# Patient Record
Sex: FEMALE | Race: WHITE | NOT HISPANIC OR LATINO | Employment: UNEMPLOYED | URBAN - METROPOLITAN AREA
[De-identification: names, ages, dates, MRNs, and addresses within clinical notes are randomized per-mention and may not be internally consistent; named-entity substitution may affect disease eponyms.]

---

## 2017-04-26 ENCOUNTER — HOSPITAL ENCOUNTER (EMERGENCY)
Facility: HOSPITAL | Age: 57
Discharge: HOME | End: 2017-04-26
Attending: EMERGENCY MEDICINE
Payer: COMMERCIAL

## 2017-04-26 VITALS
WEIGHT: 200 LBS | HEART RATE: 83 BPM | OXYGEN SATURATION: 96 % | HEIGHT: 67 IN | TEMPERATURE: 98 F | RESPIRATION RATE: 17 BRPM | BODY MASS INDEX: 31.39 KG/M2 | SYSTOLIC BLOOD PRESSURE: 200 MMHG | DIASTOLIC BLOOD PRESSURE: 83 MMHG

## 2017-04-26 DIAGNOSIS — S02.92XB OPEN FRACTURE OF FACIAL BONE, UNSPECIFIED FACIAL BONE, INITIAL ENCOUNTER: ICD-10-CM

## 2017-04-26 DIAGNOSIS — S01.81XA LACERATION OF FACE, INITIAL ENCOUNTER: Primary | ICD-10-CM

## 2017-04-26 PROCEDURE — 96372 THER/PROPH/DIAG INJ SC/IM: CPT

## 2017-04-26 PROCEDURE — 63600175 PHARM REV CODE 636 W HCPCS: Performed by: PHYSICIAN ASSISTANT

## 2017-04-26 PROCEDURE — 25000003 PHARM REV CODE 250: Performed by: PHYSICIAN ASSISTANT

## 2017-04-26 PROCEDURE — 90715 TDAP VACCINE 7 YRS/> IM: CPT | Performed by: PHYSICIAN ASSISTANT

## 2017-04-26 PROCEDURE — 99284 EMERGENCY DEPT VISIT MOD MDM: CPT | Mod: 25

## 2017-04-26 PROCEDURE — 13153 CMPLX RPR E/N/E/L ADDL 5CM/<: CPT | Mod: ,,, | Performed by: PHYSICIAN ASSISTANT

## 2017-04-26 PROCEDURE — 99283 EMERGENCY DEPT VISIT LOW MDM: CPT | Mod: 25,,, | Performed by: PHYSICIAN ASSISTANT

## 2017-04-26 PROCEDURE — 90471 IMMUNIZATION ADMIN: CPT | Performed by: PHYSICIAN ASSISTANT

## 2017-04-26 PROCEDURE — 12054 INTMD RPR FACE/MM 7.6-12.5CM: CPT

## 2017-04-26 PROCEDURE — 13152 CMPLX RPR E/N/E/L 2.6-7.5 CM: CPT | Mod: ,,, | Performed by: PHYSICIAN ASSISTANT

## 2017-04-26 RX ORDER — LIDOCAINE HYDROCHLORIDE 10 MG/ML
10 INJECTION INFILTRATION; PERINEURAL
Status: COMPLETED | OUTPATIENT
Start: 2017-04-26 | End: 2017-04-26

## 2017-04-26 RX ORDER — LORAZEPAM 2 MG/ML
1 INJECTION INTRAMUSCULAR
Status: COMPLETED | OUTPATIENT
Start: 2017-04-26 | End: 2017-04-26

## 2017-04-26 RX ORDER — CEPHALEXIN 500 MG/1
500 CAPSULE ORAL 4 TIMES DAILY
Qty: 28 CAPSULE | Refills: 0 | Status: SHIPPED | OUTPATIENT
Start: 2017-04-26 | End: 2017-05-03

## 2017-04-26 RX ORDER — CEFAZOLIN SODIUM 1 G/3ML
1 INJECTION, POWDER, FOR SOLUTION INTRAMUSCULAR; INTRAVENOUS
Status: COMPLETED | OUTPATIENT
Start: 2017-04-26 | End: 2017-04-26

## 2017-04-26 RX ADMIN — LIDOCAINE HYDROCHLORIDE 10 ML: 10 INJECTION, SOLUTION INFILTRATION; PERINEURAL at 03:04

## 2017-04-26 RX ADMIN — CEFAZOLIN 1 G: 330 INJECTION, POWDER, FOR SOLUTION INTRAMUSCULAR; INTRAVENOUS at 04:04

## 2017-04-26 RX ADMIN — CLOSTRIDIUM TETANI TOXOID ANTIGEN (FORMALDEHYDE INACTIVATED), CORYNEBACTERIUM DIPHTHERIAE TOXOID ANTIGEN (FORMALDEHYDE INACTIVATED), BORDETELLA PERTUSSIS TOXOID ANTIGEN (GLUTARALDEHYDE INACTIVATED), BORDETELLA PERTUSSIS FILAMENTOUS HEMAGGLUTININ ANTIGEN (FORMALDEHYDE INACTIVATED), BORDETELLA PERTUSSIS PERTACTIN ANTIGEN, AND BORDETELLA PERTUSSIS FIMBRIAE 2/3 ANTIGEN 0.5 ML: 5; 2; 2.5; 5; 3; 5 INJECTION, SUSPENSION INTRAMUSCULAR at 03:04

## 2017-04-26 RX ADMIN — LORAZEPAM 1 MG: 2 INJECTION INTRAMUSCULAR; INTRAVENOUS at 04:04

## 2017-04-26 NOTE — ED PROVIDER NOTES
"Encounter Date: 4/26/2017       History     Chief Complaint   Patient presents with    Head Injury     Pt reports she slipped and hit head on trash can. +LOC. Large lac noted above right eye. Right eye swollen shut.  Gauze and pressure dressing applied. Pt denies use of blood thinners, dizziness, lightheadedness at this time.     Review of patient's allergies indicates:  No Known Allergies  HPI Comments: Pt is a 57 yo F with a PMHx of anxiety, HTN and hyperlipidaemia. She presents post unobserved mechanical fall with LOC "for a few minutes". She reports bleeding from a (R) forehead laceration, and obscured vision from a swollen (R) eye. In ED she has a (R) sided headache, and denies nausea, vomiting, blurred or double vision, weakness or confusion.     Past Medical History:   Diagnosis Date    Allergy     Anxiety     Arthritis     Depression     GERD (gastroesophageal reflux disease)     Hyperlipidemia     Hypertension     Tobacco abuse     Vitamin D deficiency disease      Past Surgical History:   Procedure Laterality Date    child birth       Family History   Problem Relation Age of Onset    Heart disease Mother     Hypertension Mother     Skin cancer Mother      Social History   Substance Use Topics    Smoking status: Current Every Day Smoker     Packs/day: 1.50    Smokeless tobacco: Never Used    Alcohol use Yes      Comment: 12 pack every 3 days     Review of Systems   Constitutional: Negative for fever.   HENT: Positive for facial swelling (swollen (R) periorbital area). Negative for sore throat.    Eyes: Positive for visual disturbance (vision obscured by swollen eye lids, nil blurring or double vision).   Respiratory: Negative for shortness of breath.    Cardiovascular: Negative for chest pain.   Gastrointestinal: Negative for nausea and vomiting.   Genitourinary: Negative for dysuria.   Musculoskeletal: Negative for back pain.   Skin: Positive for wound (laceration to (R) forehead and " eyebrow). Negative for rash.   Neurological: Positive for syncope (Loss of coonsciousness after fall ) and headaches (R) sided headache . Negative for dizziness, speech difficulty, weakness, light-headedness and numbness.   Hematological: Does not bruise/bleed easily.   Psychiatric/Behavioral: The patient is nervous/anxious (Anxiety at being in ED).        Physical Exam   Initial Vitals   BP Pulse Resp Temp SpO2   04/26/17 0036 04/26/17 0036 04/26/17 0036 04/26/17 0036 04/26/17 0037   200/83 83 17 98.3 °F (36.8 °C) 96 %     Physical Exam    Constitutional: Vital signs are normal. She appears well-developed and well-nourished.   HENT:   Head: Normocephalic and atraumatic.   6cm long and 1.5cm deep laceration longitudinally along (R) forehead, extending through the right brow. Hemostasis achieved.      EOMI, PERRLA   Eyes: Conjunctivae and EOM are normal. Pupils are equal, round, and reactive to light.   Swollen (R) eyelid, subconjunctival bleeding (R) eye. Nil hyphema.   AZRA, normal extraoccular movements. Visual acuity grossly normal    Cardiovascular: Normal rate and regular rhythm. Exam reveals no gallop and no friction rub.    Murmur (Systolic mumur) heard.  Pulmonary/Chest: Breath sounds normal.   Abdominal: Soft. Normal appearance, normal aorta and bowel sounds are normal.   Musculoskeletal: Normal range of motion.   Neurological: She is alert and oriented to person, place, and time. She has normal strength. No cranial nerve deficit (CN exam grossly normal ).   Skin: Skin is warm and intact.   Psychiatric: She has a normal mood and affect. Her speech is normal and behavior is normal. Cognition and memory are normal.         ED Course   Lac Repair  Date/Time: 4/26/2017 5:09 AM  Performed by: CRISTI MARKS  Authorized by: SALVADOR GOODRICH   Body area: head/neck  Location details: right eyelid  Laceration length: 8 cm  Foreign bodies: no foreign bodies  Tendon involvement: none  Nerve involvement:  none  Vascular damage: no  Anesthesia: local infiltration    Anesthesia:  Anesthesia: local infiltration  Local Anesthetic: lidocaine 1% without epinephrine   Anesthetic total: 10 mL  Patient sedated: no  Irrigation solution: saline  Irrigation method: jet lavage  Amount of cleaning: extensive  Debridement: none  Degree of undermining: none  Skin closure: 6-0 nylon  Subcutaneous closure: 4-0 Chromic gut  Number of sutures: 14  Technique: simple and horizontal mattress  Approximation: close  Approximation difficulty: complex  Dressing: 4x4 sterile gauze and petrolatum gauze  Patient tolerance: Patient tolerated the procedure well with no immediate complications        Labs Reviewed - No data to display          Medical Decision Making:   Differential Diagnosis:   Laceration to (R) forehead, swollen (R) eyelids  ?Facial fracture  ED Management:  CT head clear,   Pt has a lamina propria Fx, Will send to ENT for followup.     Complex, deep laceration repaired, see procedure note,   Pt given ancef and DC with ABX for open Fx  DC instructions given                   ED Course     Clinical Impression:   The primary encounter diagnosis was Laceration of face, initial encounter. A diagnosis of Open fracture of facial bone, unspecified facial bone, initial encounter was also pertinent to this visit.    Disposition:   Disposition: Discharged  Condition: Stable       Lj Ramos PA-C  04/26/17 0512

## 2017-04-26 NOTE — ED AVS SNAPSHOT
[facility]  [address]  [facility] [id number]               Shannon Coleman   2017  1:33 AM   ED    Description:  Female : 1960   Department:  [facility]           Your Care was Coordinated By:     Provider Role From To    Brandan Cade MD Attending Provider 17 --    Lj Ramos PA-C Physician Assistant 17 --      Reason for Visit     Head Injury           Diagnoses this Visit        Comments    Laceration of face, initial encounter    -  Primary     Open fracture of facial bone, unspecified facial bone, initial encounter           ED Disposition     ED Disposition Condition Comment    Discharge             To Do List           Follow-up Information     Follow up with Yaya Reyna - Otorhinolaryngology.    Specialty:  Otolaryngology    Contact information:    [address]  [facility] [id number] [phone]    Additional information:    [facility] [place] - 4th Floor       These Medications        Disp Refills Start End    cephALEXin (KEFLEX) 500 MG capsule 28 capsule 0 2017 5/3/2017    Take 1 capsule (500 mg total) by mouth 4 (four) times daily. - Oral    Pharmacy: [id number] - [place], [place] - [address] [place] [address] #: [phone]         [facility]     [facility] Nurse Care Line - 24/ Assistance  Unless otherwise directed by your provider, please contact [facility], our nurse care line that is available for / assistance.     Registered nurses in the [facility] provide: appointment scheduling, clinical advisement, health education, and other advisory services.  Call: [phone] (toll free)               Medications           Message regarding Medications     Verify the changes and/or additions to your medication regime listed below are the same as discussed with your clinician today.  If any of these changes or additions are incorrect, please notify your  healthcare provider.        START taking these NEW medications        Refills    cephALEXin (KEFLEX) 500 MG capsule 0    Sig: Take 1 capsule (500 mg total) by mouth 4 (four) times daily.    Class: Print    Route: Oral      These medications were administered today        Dose Freq    lidocaine HCL 10 mg/ml (1%) injection 10 mL 10 mL ED 1 Time    Sig: 10 mLs by Infiltration route ED 1 Time.    Class: Normal    Route: Infiltration    Tdap vaccine injection 0.5 mL 0.5 mL Once    Sig: Inject 0.5 mLs into the muscle once.    Class: Normal    Route: Intramuscular    ceFAZolin injection 1 g 1 g ED 1 Time    Sig: Inject 1,000 mg (1 g total) into the muscle ED 1 Time.    Class: Normal    Route: Intramuscular    lorazepam injection 1 mg 1 mg ED 1 Time    Sig: Inject 0.5 mLs (1 mg total) into the muscle ED 1 Time.    Class: Normal    Route: Intramuscular           Verify that the below list of medications is an accurate representation of the medications you are currently taking.  If none reported, the list may be blank. If incorrect, please contact your healthcare provider. Carry this list with you in case of emergency.           Current Medications     amlodipine (NORVASC) 10 MG tablet TAKE 1 TABLET (10 MG TOTAL) BY MOUTH ONCE DAILY.    amlodipine (NORVASC) 10 MG tablet TAKE 1 TABLET (10 MG TOTAL) BY MOUTH ONCE DAILY.    amlodipine (NORVASC) 10 MG tablet Take 1 tablet (10 mg total) by mouth once daily.    atorvastatin (LIPITOR) 20 MG tablet Take 1 tablet (20 mg total) by mouth once daily.    atorvastatin (LIPITOR) 20 MG tablet TAKE 1 TABLET (20 MG TOTAL) BY MOUTH ONCE DAILY.    cephALEXin (KEFLEX) 500 MG capsule Take 1 capsule (500 mg total) by mouth 4 (four) times daily.    cetirizine (ZYRTEC) 10 MG tablet Take 1 tablet (10 mg total) by mouth every evening.    clonazePAM (KLONOPIN) 1 MG tablet Take 1 tablet (1 mg total) by mouth every evening.    diclofenac sodium (VOLTAREN) 1 % Gel Apply 2 g topically 4 (four) times daily.     fenofibrate 160 MG Tab Take 1 tablet (160 mg total) by mouth once daily.    fenofibrate 160 MG Tab TAKE 1 TABLET BY MOUTH EVERY DAY    fenofibrate 160 MG Tab TAKE 1 TABLET (160 MG TOTAL) BY MOUTH ONCE DAILY.    hydrocortisone 2.5 % ointment Apply topically 2 (two) times daily. To affected area    hydrocortisone 2.5 % ointment Apply topically 2 (two) times daily. To affected area    losartan-hydrochlorothiazide 100-25 mg (HYZAAR) 100-25 mg per tablet Take 1 tablet by mouth every morning.    losartan-hydrochlorothiazide 100-25 mg (HYZAAR) 100-25 mg per tablet Take 1 tablet by mouth every morning.    losartan-hydrochlorothiazide 100-25 mg (HYZAAR) 100-25 mg per tablet Take 1 tablet by mouth every morning.    meloxicam (MOBIC) 7.5 MG tablet Take 1 tablet (7.5 mg total) by mouth once daily.    metformin (GLUCOPHAGE) 500 MG tablet Take 1 tablet (500 mg total) by mouth daily with breakfast.    metoprolol succinate (TOPROL-XL) 25 MG 24 hr tablet TAKE 1 TABLET (25 MG TOTAL) BY MOUTH ONCE DAILY.    metoprolol succinate (TOPROL-XL) 25 MG 24 hr tablet TAKE 1 TABLET BY MOUTH EVERY DAY    metoprolol succinate (TOPROL-XL) 25 MG 24 hr tablet Take 1 tablet (25 mg total) by mouth once daily.    metoprolol succinate (TOPROL-XL) 25 MG 24 hr tablet Take 1 tablet (25 mg total) by mouth once daily.    MULTIVITS,CA,MINERALS/IRON/FA (ONE-A-DAY WOMENS FORMULA ORAL) Take 1 tablet by mouth once daily.    omega-3 fatty acids-vitamin E (FISH OIL) 1,000 mg Cap Take 2 capsules by mouth once daily.    omeprazole (PRILOSEC) 20 MG capsule TAKE ONE CAPSULE BY MOUTH EVERY DAY    paroxetine (PAXIL) 40 MG tablet TAKE 1 TABLET (40 MG TOTAL) BY MOUTH EVERY MORNING.    rosuvastatin (CRESTOR) 5 MG tablet Take 1 tablet (5 mg total) by mouth once daily.    tramadol (ULTRAM) 50 mg tablet Take 1 tablet (50 mg total) by mouth every 12 (twelve) hours as needed for Pain.           Clinical Reference Information           Your Vitals Were     BP Pulse Temp Resp  "Height Weight    200/83 (BP Location: Right arm) 83 98.3 °F (36.8 °C) (Oral) 17 5' 7" (1.702 m) 90.7 kg (200 lb)    SpO2 BMI             96% 31.32 kg/m2         Allergies as of 4/26/2017     No Known Allergies      Immunizations Administered on Date of Encounter - 4/26/2017     Name Date Dose VIS Date Route    TDAP 4/26/2017 0.5 mL 2/24/2015 Intramuscular      ED Micro, Lab, POCT     None      ED Imaging Orders     Start Ordered       Status Ordering Provider    04/26/17 0210 04/26/17 0209  CT Maxillofacial Without Contrast  1 time imaging      Final result     04/26/17 0210 04/26/17 0209  CT Head Without Contrast  1 time imaging      Final result         Discharge Instructions       Take all antibiotics until complete  followup with an ENT  Return to the ED if there is no improvement  Keep the wound clean daily with soap and water  Apply ice daily for swelling and pain.     Your Scheduled Appointments     Jul 06, 2017 10:00 AM CDT   Follow Up with Ramon Box MD   [facility]    [address] [place] [id number] [phone]              [facility] Sign-Up     Activating your [facility] account is as easy as 1-2-3!     1) Visit [email or web address], select Sign Up Now, enter this activation code and your date of birth, then select Next.  [email or web address]  Expires: 5/20/2017 12:36 PM      2) Create a username and password to use when you visit [facility] in the future and select a security question in case you lose your password and select Next.    3) Enter your e-mail address and click Sign Up!    Additional Information  If you have questions, please e-mail [email or web address] or call [phone] to talk to our [facility] staff. Remember, [facility] is NOT to be used for urgent needs. For medical emergencies, dial 911.         Smoking Cessation     If you would like to quit smoking:   You may be eligible for free services if you are a [place] resident and started smoking " cigarettes before September 1, 1988.  Call the Smoking Cessation Trust (SCT) toll free at (835) 617-1851 or (158) 226-5688.   Call 1-800-QUIT-NOW if you do not meet the above criteria.   Contact us via email: [email or web address]   View our website for more information: [email or web address]         [facility] complies with applicable Federal civil rights laws and does not discriminate on the basis of race, color, national origin, age, disability, or sex.        Language Assistance Services     ATTENTION: Language assistance services are available, free of charge. Please call [phone].      ATENCIÓN: Si habla español, tiene a rodriguez disposición servicios gratuitos de asistencia lingüística. Llame al [phone].     CHÚ Ý: N?u b?n nói Ti?ng Vi?t, có các d?ch v? h? tr? ngôn ng? mi?n phí dành cho b?n. G?i s? [phone].

## 2017-04-26 NOTE — ED TRIAGE NOTES
Pt presents to ED c/o falling tonight causing right eye injury and laceration to right forehead. Pt stated that she slipped when taking the trash out and hit her head on the garbage can. Pt lost consciousness for a few minutes. Bleeding currently controlled. Severe swelling noted to right eye.

## 2017-04-26 NOTE — DISCHARGE INSTRUCTIONS
Take all antibiotics until complete  followup with an ENT  Return to the ED if there is no improvement  Keep the wound clean daily with soap and water  Apply ice daily for swelling and pain.

## 2018-06-24 ENCOUNTER — HOSPITAL ENCOUNTER (INPATIENT)
Facility: HOSPITAL | Age: 58
LOS: 6 days | Discharge: HOME | DRG: 982 | End: 2018-06-30
Attending: EMERGENCY MEDICINE | Admitting: HOSPITALIST
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
## 2018-06-24 DIAGNOSIS — R07.9 CHEST PAIN: ICD-10-CM

## 2018-06-24 DIAGNOSIS — R55 SYNCOPE: ICD-10-CM

## 2018-06-24 DIAGNOSIS — R55 SYNCOPE, UNSPECIFIED SYNCOPE TYPE: ICD-10-CM

## 2018-06-24 DIAGNOSIS — F41.9 ANXIETY: ICD-10-CM

## 2018-06-24 DIAGNOSIS — S42.202A CLOSED FRACTURE OF PROXIMAL END OF LEFT HUMERUS, UNSPECIFIED FRACTURE MORPHOLOGY, INITIAL ENCOUNTER: ICD-10-CM

## 2018-06-24 DIAGNOSIS — I50.30 (HFPEF) HEART FAILURE WITH PRESERVED EJECTION FRACTION: ICD-10-CM

## 2018-06-24 PROBLEM — E87.1 HYPONATREMIA: Status: ACTIVE | Noted: 2018-06-24

## 2018-06-24 LAB
ALBUMIN SERPL BCP-MCNC: 4 G/DL
ALP SERPL-CCNC: 66 U/L
ALT SERPL W/O P-5'-P-CCNC: 17 U/L
AMPHET+METHAMPHET UR QL: NEGATIVE
ANION GAP SERPL CALC-SCNC: 10 MMOL/L
ANION GAP SERPL CALC-SCNC: 11 MMOL/L
ANION GAP SERPL CALC-SCNC: 12 MMOL/L
ANION GAP SERPL CALC-SCNC: 15 MMOL/L
AST SERPL-CCNC: 24 U/L
BACTERIA #/AREA URNS AUTO: NORMAL /HPF
BARBITURATES UR QL SCN>200 NG/ML: NEGATIVE
BASOPHILS # BLD AUTO: 0.09 K/UL
BASOPHILS NFR BLD: 0.5 %
BENZODIAZ UR QL SCN>200 NG/ML: NEGATIVE
BILIRUB SERPL-MCNC: 0.4 MG/DL
BILIRUB UR QL STRIP: NEGATIVE
BNP SERPL-MCNC: <10 PG/ML
BUN SERPL-MCNC: 10 MG/DL
BUN SERPL-MCNC: 10 MG/DL
BUN SERPL-MCNC: 11 MG/DL
BUN SERPL-MCNC: 12 MG/DL
BZE UR QL SCN: NEGATIVE
CALCIUM SERPL-MCNC: 8.8 MG/DL
CALCIUM SERPL-MCNC: 9.2 MG/DL
CALCIUM SERPL-MCNC: 9.4 MG/DL
CALCIUM SERPL-MCNC: 9.5 MG/DL
CANNABINOIDS UR QL SCN: ABNORMAL
CHLORIDE SERPL-SCNC: 85 MMOL/L
CHLORIDE SERPL-SCNC: 88 MMOL/L
CHLORIDE SERPL-SCNC: 92 MMOL/L
CHLORIDE SERPL-SCNC: 95 MMOL/L
CLARITY UR REFRACT.AUTO: CLEAR
CO2 SERPL-SCNC: 21 MMOL/L
CO2 SERPL-SCNC: 23 MMOL/L
CO2 SERPL-SCNC: 24 MMOL/L
CO2 SERPL-SCNC: 25 MMOL/L
COLOR UR AUTO: ABNORMAL
CREAT SERPL-MCNC: 0.6 MG/DL
CREAT SERPL-MCNC: 0.7 MG/DL
CREAT SERPL-MCNC: 0.7 MG/DL
CREAT SERPL-MCNC: 0.9 MG/DL
CREAT UR-MCNC: 47 MG/DL
D DIMER PPP IA.FEU-MCNC: 1.05 MG/L FEU
DIFFERENTIAL METHOD: ABNORMAL
EOSINOPHIL # BLD AUTO: 0 K/UL
EOSINOPHIL NFR BLD: 0.1 %
ERYTHROCYTE [DISTWIDTH] IN BLOOD BY AUTOMATED COUNT: 19.7 %
EST. GFR  (AFRICAN AMERICAN): >60 ML/MIN/1.73 M^2
EST. GFR  (NON AFRICAN AMERICAN): >60 ML/MIN/1.73 M^2
ETHANOL SERPL-MCNC: <10 MG/DL
ETHANOL UR-MCNC: 29 MG/DL
GLUCOSE SERPL-MCNC: 103 MG/DL
GLUCOSE SERPL-MCNC: 117 MG/DL
GLUCOSE SERPL-MCNC: 144 MG/DL
GLUCOSE SERPL-MCNC: 157 MG/DL
GLUCOSE UR QL STRIP: NEGATIVE
HCT VFR BLD AUTO: 30.7 %
HGB BLD-MCNC: 9.8 G/DL
HGB UR QL STRIP: ABNORMAL
HYALINE CASTS UR QL AUTO: 0 /LPF
IMM GRANULOCYTES # BLD AUTO: 0.22 K/UL
IMM GRANULOCYTES NFR BLD AUTO: 1.1 %
KETONES UR QL STRIP: NEGATIVE
LEUKOCYTE ESTERASE UR QL STRIP: NEGATIVE
LYMPHOCYTES # BLD AUTO: 1.6 K/UL
LYMPHOCYTES NFR BLD: 8.4 %
MCH RBC QN AUTO: 22.7 PG
MCHC RBC AUTO-ENTMCNC: 31.9 G/DL
MCV RBC AUTO: 71 FL
METHADONE UR QL SCN>300 NG/ML: NEGATIVE
MICROSCOPIC COMMENT: NORMAL
MONOCYTES # BLD AUTO: 1.2 K/UL
MONOCYTES NFR BLD: 6.1 %
NEUTROPHILS # BLD AUTO: 16.1 K/UL
NEUTROPHILS NFR BLD: 83.8 %
NITRITE UR QL STRIP: NEGATIVE
NRBC BLD-RTO: 0 /100 WBC
OPIATES UR QL SCN: NEGATIVE
OSMOLALITY SERPL: 268 MOSM/KG
OSMOLALITY UR: 481 MOSM/KG
PCP UR QL SCN>25 NG/ML: NEGATIVE
PH UR STRIP: 5 [PH] (ref 5–8)
PLATELET # BLD AUTO: 417 K/UL
PMV BLD AUTO: 9 FL
POCT GLUCOSE: 153 MG/DL (ref 70–110)
POTASSIUM SERPL-SCNC: 3 MMOL/L
POTASSIUM SERPL-SCNC: 3.3 MMOL/L
POTASSIUM SERPL-SCNC: 3.5 MMOL/L
POTASSIUM SERPL-SCNC: 3.8 MMOL/L
PROCALCITONIN SERPL IA-MCNC: <0.02 NG/ML
PROT SERPL-MCNC: 7.9 G/DL
PROT UR QL STRIP: ABNORMAL
RBC # BLD AUTO: 4.32 M/UL
RBC #/AREA URNS AUTO: 1 /HPF (ref 0–4)
SODIUM SERPL-SCNC: 121 MMOL/L
SODIUM SERPL-SCNC: 122 MMOL/L
SODIUM SERPL-SCNC: 128 MMOL/L
SODIUM SERPL-SCNC: 130 MMOL/L
SODIUM UR-SCNC: 63 MMOL/L
SP GR UR STRIP: 1.01 (ref 1–1.03)
TOXICOLOGY INFORMATION: ABNORMAL
TROPONIN I SERPL DL<=0.01 NG/ML-MCNC: <0.006 NG/ML
URN SPEC COLLECT METH UR: ABNORMAL
UROBILINOGEN UR STRIP-ACNC: NEGATIVE EU/DL
UUN UR-MCNC: 427 MG/DL
WBC # BLD AUTO: 19.27 K/UL
WBC #/AREA URNS AUTO: 1 /HPF (ref 0–5)

## 2018-06-24 PROCEDURE — 82962 GLUCOSE BLOOD TEST: CPT

## 2018-06-24 PROCEDURE — 84145 PROCALCITONIN (PCT): CPT

## 2018-06-24 PROCEDURE — 83930 ASSAY OF BLOOD OSMOLALITY: CPT

## 2018-06-24 PROCEDURE — 84484 ASSAY OF TROPONIN QUANT: CPT

## 2018-06-24 PROCEDURE — 25000003 PHARM REV CODE 250: Performed by: STUDENT IN AN ORGANIZED HEALTH CARE EDUCATION/TRAINING PROGRAM

## 2018-06-24 PROCEDURE — 85025 COMPLETE CBC W/AUTO DIFF WBC: CPT

## 2018-06-24 PROCEDURE — 83880 ASSAY OF NATRIURETIC PEPTIDE: CPT

## 2018-06-24 PROCEDURE — 96375 TX/PRO/DX INJ NEW DRUG ADDON: CPT

## 2018-06-24 PROCEDURE — 85379 FIBRIN DEGRADATION QUANT: CPT

## 2018-06-24 PROCEDURE — 99222 1ST HOSP IP/OBS MODERATE 55: CPT | Mod: ,,, | Performed by: HOSPITALIST

## 2018-06-24 PROCEDURE — 84540 ASSAY OF URINE/UREA-N: CPT

## 2018-06-24 PROCEDURE — 63600175 PHARM REV CODE 636 W HCPCS: Performed by: STUDENT IN AN ORGANIZED HEALTH CARE EDUCATION/TRAINING PROGRAM

## 2018-06-24 PROCEDURE — 84300 ASSAY OF URINE SODIUM: CPT

## 2018-06-24 PROCEDURE — 81001 URINALYSIS AUTO W/SCOPE: CPT

## 2018-06-24 PROCEDURE — 93005 ELECTROCARDIOGRAM TRACING: CPT

## 2018-06-24 PROCEDURE — 83935 ASSAY OF URINE OSMOLALITY: CPT

## 2018-06-24 PROCEDURE — 80307 DRUG TEST PRSMV CHEM ANLYZR: CPT

## 2018-06-24 PROCEDURE — 99285 EMERGENCY DEPT VISIT HI MDM: CPT | Mod: ,,, | Performed by: EMERGENCY MEDICINE

## 2018-06-24 PROCEDURE — 96374 THER/PROPH/DIAG INJ IV PUSH: CPT

## 2018-06-24 PROCEDURE — 80053 COMPREHEN METABOLIC PANEL: CPT

## 2018-06-24 PROCEDURE — 80048 BASIC METABOLIC PNL TOTAL CA: CPT

## 2018-06-24 PROCEDURE — 80320 DRUG SCREEN QUANTALCOHOLS: CPT

## 2018-06-24 PROCEDURE — 25000003 PHARM REV CODE 250: Performed by: HOSPITALIST

## 2018-06-24 PROCEDURE — 36415 COLL VENOUS BLD VENIPUNCTURE: CPT

## 2018-06-24 PROCEDURE — 93010 ELECTROCARDIOGRAM REPORT: CPT | Mod: ,,, | Performed by: INTERNAL MEDICINE

## 2018-06-24 PROCEDURE — 99285 EMERGENCY DEPT VISIT HI MDM: CPT | Mod: 25

## 2018-06-24 PROCEDURE — 11000001 HC ACUTE MED/SURG PRIVATE ROOM

## 2018-06-24 RX ORDER — ASPIRIN 325 MG
325 TABLET ORAL
Status: COMPLETED | OUTPATIENT
Start: 2018-06-24 | End: 2018-06-24

## 2018-06-24 RX ORDER — ENOXAPARIN SODIUM 100 MG/ML
40 INJECTION SUBCUTANEOUS EVERY 24 HOURS
Status: DISCONTINUED | OUTPATIENT
Start: 2018-06-25 | End: 2018-06-25

## 2018-06-24 RX ORDER — DIAZEPAM 5 MG/1
5 TABLET ORAL ONCE
Status: COMPLETED | OUTPATIENT
Start: 2018-06-24 | End: 2018-06-24

## 2018-06-24 RX ORDER — ONDANSETRON 2 MG/ML
8 INJECTION INTRAMUSCULAR; INTRAVENOUS ONCE
Status: COMPLETED | OUTPATIENT
Start: 2018-06-24 | End: 2018-06-24

## 2018-06-24 RX ORDER — POTASSIUM CHLORIDE 20 MEQ/1
40 TABLET, EXTENDED RELEASE ORAL EVERY 4 HOURS
Status: COMPLETED | OUTPATIENT
Start: 2018-06-24 | End: 2018-06-24

## 2018-06-24 RX ORDER — CITALOPRAM 40 MG/1
40 TABLET, FILM COATED ORAL DAILY
Status: DISCONTINUED | OUTPATIENT
Start: 2018-06-25 | End: 2018-06-30 | Stop reason: HOSPADM

## 2018-06-24 RX ORDER — MORPHINE SULFATE 4 MG/ML
6 INJECTION, SOLUTION INTRAMUSCULAR; INTRAVENOUS
Status: COMPLETED | OUTPATIENT
Start: 2018-06-24 | End: 2018-06-24

## 2018-06-24 RX ORDER — DIAZEPAM 5 MG/ML
5 INJECTION, SOLUTION INTRAMUSCULAR; INTRAVENOUS ONCE
Status: DISCONTINUED | OUTPATIENT
Start: 2018-06-24 | End: 2018-06-24

## 2018-06-24 RX ORDER — OXYCODONE AND ACETAMINOPHEN 5; 325 MG/1; MG/1
1 TABLET ORAL EVERY 6 HOURS PRN
Status: DISCONTINUED | OUTPATIENT
Start: 2018-06-24 | End: 2018-06-28

## 2018-06-24 RX ORDER — METHOCARBAMOL 500 MG/1
1000 TABLET, FILM COATED ORAL
Status: COMPLETED | OUTPATIENT
Start: 2018-06-24 | End: 2018-06-24

## 2018-06-24 RX ORDER — KETAMINE HYDROCHLORIDE 100 MG/ML
100 INJECTION, SOLUTION INTRAMUSCULAR; INTRAVENOUS ONCE
Status: DISCONTINUED | OUTPATIENT
Start: 2018-06-24 | End: 2018-06-24

## 2018-06-24 RX ORDER — PROPOFOL 10 MG/ML
100 VIAL (ML) INTRAVENOUS ONCE
Status: DISCONTINUED | OUTPATIENT
Start: 2018-06-24 | End: 2018-06-24

## 2018-06-24 RX ORDER — SODIUM CHLORIDE 9 MG/ML
INJECTION, SOLUTION INTRAVENOUS CONTINUOUS
Status: DISCONTINUED | OUTPATIENT
Start: 2018-06-24 | End: 2018-06-24

## 2018-06-24 RX ORDER — FENOFIBRATE 160 MG/1
160 TABLET ORAL DAILY
Status: DISCONTINUED | OUTPATIENT
Start: 2018-06-25 | End: 2018-06-30 | Stop reason: HOSPADM

## 2018-06-24 RX ORDER — METOPROLOL SUCCINATE 25 MG/1
25 TABLET, EXTENDED RELEASE ORAL DAILY
Status: DISCONTINUED | OUTPATIENT
Start: 2018-06-25 | End: 2018-06-30 | Stop reason: HOSPADM

## 2018-06-24 RX ORDER — ONDANSETRON 8 MG/1
8 TABLET, ORALLY DISINTEGRATING ORAL
Status: COMPLETED | OUTPATIENT
Start: 2018-06-24 | End: 2018-06-24

## 2018-06-24 RX ADMIN — POTASSIUM CHLORIDE 40 MEQ: 1500 TABLET, EXTENDED RELEASE ORAL at 02:06

## 2018-06-24 RX ADMIN — ONDANSETRON 8 MG: 8 TABLET, ORALLY DISINTEGRATING ORAL at 01:06

## 2018-06-24 RX ADMIN — OXYCODONE HYDROCHLORIDE AND ACETAMINOPHEN 1 TABLET: 5; 325 TABLET ORAL at 09:06

## 2018-06-24 RX ADMIN — SODIUM CHLORIDE 1000 ML: 0.9 INJECTION, SOLUTION INTRAVENOUS at 02:06

## 2018-06-24 RX ADMIN — MORPHINE SULFATE 6 MG: 4 INJECTION INTRAVENOUS at 04:06

## 2018-06-24 RX ADMIN — METHOCARBAMOL 1000 MG: 500 TABLET ORAL at 01:06

## 2018-06-24 RX ADMIN — ONDANSETRON 8 MG: 2 INJECTION INTRAMUSCULAR; INTRAVENOUS at 04:06

## 2018-06-24 RX ADMIN — OXYCODONE HYDROCHLORIDE AND ACETAMINOPHEN 1 TABLET: 5; 325 TABLET ORAL at 04:06

## 2018-06-24 RX ADMIN — DIAZEPAM 5 MG: 5 TABLET ORAL at 10:06

## 2018-06-24 RX ADMIN — Medication 325 MG: at 01:06

## 2018-06-24 RX ADMIN — POTASSIUM CHLORIDE 40 MEQ: 1500 TABLET, EXTENDED RELEASE ORAL at 09:06

## 2018-06-24 RX ADMIN — SODIUM CHLORIDE: 0.9 INJECTION, SOLUTION INTRAVENOUS at 09:06

## 2018-06-24 NOTE — HPI
Shannon Coleman is a 57 y.o. female smoker who presents to the ED after sustaining a fall and loss of consciousness earlier today.  Patient has been ill recently and states she is dehydrated.  She fell onto her left shoulder.  Orthopedics was counseled to for concern for shoulder dislocation and for a proximal humerus fracture on the left side.  Patient has pain in the left shoulder but otherwise denies pain anywhere else and she denies numbness and tingling.

## 2018-06-24 NOTE — SUBJECTIVE & OBJECTIVE
Past Medical History:   Diagnosis Date    Allergy     Anxiety     Arthritis     Depression     GERD (gastroesophageal reflux disease)     Hyperlipidemia     Hypertension     Tobacco abuse     Vitamin D deficiency disease        Past Surgical History:   Procedure Laterality Date    child birth         Review of patient's allergies indicates:  No Known Allergies    No current facility-administered medications for this encounter.      Current Outpatient Prescriptions   Medication Sig    amlodipine (NORVASC) 10 MG tablet TAKE 1 TABLET (10 MG TOTAL) BY MOUTH ONCE DAILY.    amlodipine (NORVASC) 10 MG tablet TAKE 1 TABLET (10 MG TOTAL) BY MOUTH ONCE DAILY.    amLODIPine (NORVASC) 10 MG tablet Take 1 tablet (10 mg total) by mouth once daily.    amLODIPine (NORVASC) 10 MG tablet TAKE 1 TABLET (10 MG TOTAL) BY MOUTH ONCE DAILY.    atorvastatin (LIPITOR) 20 MG tablet TAKE 1 TABLET (20 MG TOTAL) BY MOUTH ONCE DAILY.    atorvastatin (LIPITOR) 20 MG tablet TAKE 1 TABLET (20 MG TOTAL) BY MOUTH ONCE DAILY.    cetirizine (ZYRTEC) 10 MG tablet Take 1 tablet (10 mg total) by mouth every evening.    citalopram (CELEXA) 40 MG tablet Take 1 tablet (40 mg total) by mouth once daily.    clonazePAM (KLONOPIN) 1 MG tablet Take 1 tablet (1 mg total) by mouth every evening.    diclofenac sodium (VOLTAREN) 1 % Gel Apply 2 g topically 4 (four) times daily.    ergocalciferol (ERGOCALCIFEROL) 50,000 unit Cap Take 1 capsule (50,000 Units total) by mouth every 7 days.    fenofibrate 160 MG Tab Take 1 tablet (160 mg total) by mouth once daily.    fenofibrate 160 MG Tab TAKE 1 TABLET BY MOUTH EVERY DAY    fenofibrate 160 MG Tab TAKE 1 TABLET (160 MG TOTAL) BY MOUTH ONCE DAILY.    fenofibrate 160 MG Tab TAKE ONE(1) TABLET BY MOUTH EVERY DAY    fenofibrate 160 MG Tab TAKE ONE(1) TABLET BY MOUTH EVERY DAY    fenofibrate 160 MG Tab TAKE ONE(1) TABLET BY MOUTH EVERY DAY    hydrocortisone 2.5 % ointment Apply topically 2 (two)  times daily. To affected area    hydrocortisone 2.5 % ointment Apply topically 2 (two) times daily. To affected area    hydrocortisone 2.5 % ointment Apply topically 2 (two) times daily. To affected area    losartan-hydrochlorothiazide 100-25 mg (HYZAAR) 100-25 mg per tablet Take 1 tablet by mouth every morning.    losartan-hydrochlorothiazide 100-25 mg (HYZAAR) 100-25 mg per tablet Take 1 tablet by mouth every morning.    losartan-hydrochlorothiazide 100-25 mg (HYZAAR) 100-25 mg per tablet Take 1 tablet by mouth every morning.    losartan-hydrochlorothiazide 100-25 mg (HYZAAR) 100-25 mg per tablet Take 1 tablet by mouth every morning.    losartan-hydrochlorothiazide 100-25 mg (HYZAAR) 100-25 mg per tablet TAKE ONE(1) TABLET BY MOUTH EVERY MORNING    losartan-hydrochlorothiazide 100-25 mg (HYZAAR) 100-25 mg per tablet Take 1 tablet by mouth every morning.    losartan-hydrochlorothiazide 100-25 mg (HYZAAR) 100-25 mg per tablet TAKE 1 TABLET BY MOUTH EVERY MORNING.    meclizine (ANTIVERT) 25 mg tablet Take 1 tablet (25 mg total) by mouth 3 (three) times daily as needed for Nausea.    meloxicam (MOBIC) 7.5 MG tablet Take 1 tablet (7.5 mg total) by mouth once daily.    metformin (GLUCOPHAGE) 500 MG tablet Take 1 tablet (500 mg total) by mouth daily with breakfast.    metFORMIN (GLUCOPHAGE) 500 MG tablet TAKE 1 TABLET (500 MG TOTAL) BY MOUTH DAILY WITH BREAKFAST.    metFORMIN (GLUCOPHAGE) 500 MG tablet TAKE 1 TABLET (500 MG TOTAL) BY MOUTH DAILY WITH BREAKFAST.    metoprolol succinate (TOPROL-XL) 25 MG 24 hr tablet TAKE 1 TABLET (25 MG TOTAL) BY MOUTH ONCE DAILY.    metoprolol succinate (TOPROL-XL) 25 MG 24 hr tablet TAKE 1 TABLET BY MOUTH EVERY DAY    metoprolol succinate (TOPROL-XL) 25 MG 24 hr tablet Take 1 tablet (25 mg total) by mouth once daily.    metoprolol succinate (TOPROL-XL) 25 MG 24 hr tablet Take 1 tablet (25 mg total) by mouth once daily.    metoprolol succinate (TOPROL-XL) 25 MG 24 hr  tablet TAKE 1 TABLET (25 MG TOTAL) BY MOUTH ONCE DAILY.    MULTIVITS,CA,MINERALS/IRON/FA (ONE-A-DAY WOMENS FORMULA ORAL) Take 1 tablet by mouth once daily.    omega-3 fatty acids-vitamin E (FISH OIL) 1,000 mg Cap Take 2 capsules by mouth once daily.    omeprazole (PRILOSEC) 20 MG capsule TAKE ONE CAPSULE BY MOUTH EVERY DAY    paroxetine (PAXIL) 40 MG tablet TAKE 1 TABLET (40 MG TOTAL) BY MOUTH EVERY MORNING.    rosuvastatin (CRESTOR) 5 MG tablet Take 1 tablet (5 mg total) by mouth once daily.    tramadol (ULTRAM) 50 mg tablet Take 1 tablet (50 mg total) by mouth every 12 (twelve) hours as needed for Pain.     Family History     Problem Relation (Age of Onset)    Heart disease Mother    Hypertension Mother    Skin cancer Mother        Social History Main Topics    Smoking status: Current Every Day Smoker     Packs/day: 1.50    Smokeless tobacco: Never Used    Alcohol use Yes      Comment: 12 pack every 3 days    Drug use: No    Sexual activity: Yes     Partners: Male     ROS     Constitutional: Negative for chills and fever.   HENT: Negative for facial swelling and sore throat.    Eyes: Negative for pain and redness.   Respiratory: Negative for shortness of breath.    Cardiovascular: Negative for chest pain.   Gastrointestinal: Positive for diarrhea, nausea and vomiting.   Genitourinary: Negative for dysuria.   Musculoskeletal: Positive for joint swelling. Negative for back pain and neck pain.   Skin: Negative for rash and wound.   Neurological: Positive for syncope. Negative for weakness.   Hematological: Does not bruise/bleed easily.   All other systems reviewed and are negative.  Objective:     Vital Signs (Most Recent):  Temp: 98.4 °F (36.9 °C) (06/24/18 0018)  Pulse: 82 (06/24/18 0501)  Resp: 20 (06/24/18 0018)  BP: (!) 172/79 (06/24/18 0501)  SpO2: 97 % (06/24/18 0501) Vital Signs (24h Range):  Temp:  [98.4 °F (36.9 °C)] 98.4 °F (36.9 °C)  Pulse:  [82-98] 82  Resp:  [20] 20  SpO2:  [97 %-99 %] 97  "%  BP: (172-205)/(79-96) 172/79        Height: 5' 7" (170.2 cm)  There is no height or weight on file to calculate BMI.    No intake or output data in the 24 hours ending 06/24/18 0528    Ortho/SPM Exam    Gen:  No acute distress  CV:  Peripherally well-perfused.  Pulses 2+ bilaterally.  Lungs:  Normal respiratory effort.  Abdomen:  Soft, non-tender, non-distended  Head/Neck:  Normocephalic.  Atraumatic. No TTP, AROM and PROM intact without pain  Neuro:  CN intact without deficit, SILT throughout B/L Upper & Lower Extremities  Pelvis: No TTP, Stable to direct anterior pressure over ASIS.    MSK:  LUE:  - There is no obvious deformity there is severe tenderness to palpation at the left shoulder,  she does have very large arms  -Patient was only minimally able to move her left shoulder due to pain,  passive range of motion was also difficult due to pain  - AROM and PROM of the elbow wrist and MCP PIP and DIP is intact without pain  - AIN/PIN/Radial/Median/Ulnar Nerves assessed in isolation without deficit  - SILT throughout  - Radial & Ulnar arteries palpated 2+  - Capillary Refill <3s    Bilateral LE:  - No obvious deformity no tenderness to palpation logroll negative  - AROM and PROM intact.  - TA/EHL/Gastroc/FHL assessed in isolation without deficit  - SILT throughout  - DP and PT palpated  2+  - Capillary Refill <3s        Significant Labs: All pertinent labs within the past 24 hours have been reviewed.    Significant Imaging: I have reviewed and interpreted all pertinent imaging results/findings.   Comminuted and displaced left proximal humerus fracture with shortening there is no glenohumeral joint dislocation  "

## 2018-06-24 NOTE — PLAN OF CARE
Problem: Patient Care Overview  Goal: Plan of Care Review  Outcome: Ongoing (interventions implemented as appropriate)  Patient free of falls/traumas/injuries. Skin clean, dry, and intact. RUE in sling, anxiety for CT managed with once time dose Valium. Pain managed with PRN medictions. NS gtt infusing. Patient educated on plan of care and verbalized understanding. Patients VS stable and no distress. Will continue to monitor.

## 2018-06-24 NOTE — NURSING TRANSFER
Nursing Transfer Note      6/24/2018     Transfer From: ED           To [address]b     Transfer via stretcher    Transfer with cardiac monitoring    Transported by transporter    Medicines sent: none    Chart send with patient: Yes    Notified: spouse    Patient reassessed at: by day nurse (date, time)    Upon arrival to floor: cardiac monitor applied, patient oriented to room, call bell in reach and bed in lowest position

## 2018-06-24 NOTE — HPI
"57 year old woman with htn on HCTZ who presents after a syncopal episode. Pt states she felt well and was in her normal state of health throughout the day yesterday. She was sitting in her chair outside with her  when she got up to go to the bathroom for a bowel movement. She states she walked into the bathroom and suddenly felt light headed and dizzy then passed out. When she awoke, she denies any confusion but states she had soiled herself. Her  found her and helped her up. She states she continued to feel weak and "drained" but was able to  shower with assistance from her  and go to bed. She reports since the fall her left arm hurt and increasingly became worse thus she went to the ED for evaluation. She does report normal PO intake but reports she did have 4 beers earlier in the afternoon which did cause increased urinary frequency.  She denies any chest pain or palpitations prior to syncopal episode. She also denies any sob, headache, focal deficits, paraesthesias, or tongue biting.   "

## 2018-06-24 NOTE — ASSESSMENT & PLAN NOTE
Shannon Coleman is a 57 y.o. female with a left proximal humerus fracture that is comminuted displaced and shortened.    -Fractures significantly shortened so patient will need aggressive pain control and especially muscle relaxants  -Patient is going to be admitted to the hospital for hyponatremia  -orthopedics will follow along while she is in the hospital   -We will have her follow up in clinic in a week to discuss further management will possibly need surgical intervention  -Would be best for patient to sleep at an incline this will likely help with her pain and she should be placed in a sling and swath

## 2018-06-24 NOTE — H&P
"[facility] Hospital Medicine  History & Physical    Patient Name: Shannon Coleman  MRN: [id number]  Admission Date: 6/24/2018  Attending Physician: Mounika Torres MD  Primary Care Provider: Ramon Box MD    [facility] Medicine Team: [facility] MED A Mounika Torres MD     Patient information was obtained from patient and ER records.     Subjective:     Principal Problem:Syncope    Chief Complaint:   Chief Complaint   Patient presents with    Loss of Consciousness     Pt presents to ED c/o "passing out" in the bathroom. Pt denies head injury. Reports feeling dehydrated. C/o feeling anxious.         HPI: 57 year old woman with htn on HCTZ who presents after a syncopal episode. Pt states she felt well and was in her normal state of health throughout the day yesterday. She was sitting in her chair outside with her  when she got up to go to the bathroom for a bowel movement. She states she walked into the bathroom and suddenly felt light headed and dizzy then passed out. When she awoke, she denies any confusion but states she had soiled herself. Her  found her and helped her up. She states she continued to feel weak and "drained" but was able to  shower with assistance from her  and go to bed. She reports since the fall her left arm hurt and increasingly became worse thus she went to the ED for evaluation. She does report normal PO intake but reports she did have 4 beers earlier in the afternoon which did cause increased urinary frequency.  She denies any chest pain or palpitations prior to syncopal episode. She also denies any sob, headache, focal deficits, paraesthesias, or tongue biting.     Past Medical History:   Diagnosis Date    Allergy     Anxiety     Arthritis     Depression     GERD (gastroesophageal reflux disease)     Hyperlipidemia     Hypertension     Tobacco abuse     Vitamin D deficiency disease        Past Surgical History:   Procedure Laterality Date "    child birth         Review of patient's allergies indicates:  No Known Allergies    No current facility-administered medications on file prior to encounter.      Current Outpatient Prescriptions on File Prior to Encounter   Medication Sig    amlodipine (NORVASC) 10 MG tablet TAKE 1 TABLET (10 MG TOTAL) BY MOUTH ONCE DAILY.    amlodipine (NORVASC) 10 MG tablet TAKE 1 TABLET (10 MG TOTAL) BY MOUTH ONCE DAILY.    amLODIPine (NORVASC) 10 MG tablet Take 1 tablet (10 mg total) by mouth once daily.    amLODIPine (NORVASC) 10 MG tablet TAKE 1 TABLET (10 MG TOTAL) BY MOUTH ONCE DAILY.    atorvastatin (LIPITOR) 20 MG tablet TAKE 1 TABLET (20 MG TOTAL) BY MOUTH ONCE DAILY.    atorvastatin (LIPITOR) 20 MG tablet TAKE 1 TABLET (20 MG TOTAL) BY MOUTH ONCE DAILY.    cetirizine (ZYRTEC) 10 MG tablet Take 1 tablet (10 mg total) by mouth every evening.    citalopram (CELEXA) 40 MG tablet Take 1 tablet (40 mg total) by mouth once daily.    clonazePAM (KLONOPIN) 1 MG tablet Take 1 tablet (1 mg total) by mouth every evening.    diclofenac sodium (VOLTAREN) 1 % Gel Apply 2 g topically 4 (four) times daily.    ergocalciferol (ERGOCALCIFEROL) 50,000 unit Cap Take 1 capsule (50,000 Units total) by mouth every 7 days.    fenofibrate 160 MG Tab Take 1 tablet (160 mg total) by mouth once daily.    fenofibrate 160 MG Tab TAKE 1 TABLET BY MOUTH EVERY DAY    fenofibrate 160 MG Tab TAKE 1 TABLET (160 MG TOTAL) BY MOUTH ONCE DAILY.    fenofibrate 160 MG Tab TAKE ONE(1) TABLET BY MOUTH EVERY DAY    fenofibrate 160 MG Tab TAKE ONE(1) TABLET BY MOUTH EVERY DAY    fenofibrate 160 MG Tab TAKE ONE(1) TABLET BY MOUTH EVERY DAY    hydrocortisone 2.5 % ointment Apply topically 2 (two) times daily. To affected area    hydrocortisone 2.5 % ointment Apply topically 2 (two) times daily. To affected area    hydrocortisone 2.5 % ointment Apply topically 2 (two) times daily. To affected area    losartan-hydrochlorothiazide 100-25 mg  (HYZAAR) 100-25 mg per tablet Take 1 tablet by mouth every morning.    losartan-hydrochlorothiazide 100-25 mg (HYZAAR) 100-25 mg per tablet Take 1 tablet by mouth every morning.    losartan-hydrochlorothiazide 100-25 mg (HYZAAR) 100-25 mg per tablet Take 1 tablet by mouth every morning.    losartan-hydrochlorothiazide 100-25 mg (HYZAAR) 100-25 mg per tablet Take 1 tablet by mouth every morning.    losartan-hydrochlorothiazide 100-25 mg (HYZAAR) 100-25 mg per tablet TAKE ONE(1) TABLET BY MOUTH EVERY MORNING    losartan-hydrochlorothiazide 100-25 mg (HYZAAR) 100-25 mg per tablet Take 1 tablet by mouth every morning.    losartan-hydrochlorothiazide 100-25 mg (HYZAAR) 100-25 mg per tablet TAKE 1 TABLET BY MOUTH EVERY MORNING.    meclizine (ANTIVERT) 25 mg tablet Take 1 tablet (25 mg total) by mouth 3 (three) times daily as needed for Nausea.    meloxicam (MOBIC) 7.5 MG tablet Take 1 tablet (7.5 mg total) by mouth once daily.    metformin (GLUCOPHAGE) 500 MG tablet Take 1 tablet (500 mg total) by mouth daily with breakfast.    metFORMIN (GLUCOPHAGE) 500 MG tablet TAKE 1 TABLET (500 MG TOTAL) BY MOUTH DAILY WITH BREAKFAST.    metFORMIN (GLUCOPHAGE) 500 MG tablet TAKE 1 TABLET (500 MG TOTAL) BY MOUTH DAILY WITH BREAKFAST.    metoprolol succinate (TOPROL-XL) 25 MG 24 hr tablet TAKE 1 TABLET (25 MG TOTAL) BY MOUTH ONCE DAILY.    metoprolol succinate (TOPROL-XL) 25 MG 24 hr tablet TAKE 1 TABLET BY MOUTH EVERY DAY    metoprolol succinate (TOPROL-XL) 25 MG 24 hr tablet Take 1 tablet (25 mg total) by mouth once daily.    metoprolol succinate (TOPROL-XL) 25 MG 24 hr tablet Take 1 tablet (25 mg total) by mouth once daily.    metoprolol succinate (TOPROL-XL) 25 MG 24 hr tablet TAKE 1 TABLET (25 MG TOTAL) BY MOUTH ONCE DAILY.    MULTIVITS,CA,MINERALS/IRON/FA (ONE-A-DAY WOMENS FORMULA ORAL) Take 1 tablet by mouth once daily.    omega-3 fatty acids-vitamin E (FISH OIL) 1,000 mg Cap Take 2 capsules by mouth once  daily.    omeprazole (PRILOSEC) 20 MG capsule TAKE ONE CAPSULE BY MOUTH EVERY DAY    paroxetine (PAXIL) 40 MG tablet TAKE 1 TABLET (40 MG TOTAL) BY MOUTH EVERY MORNING.    rosuvastatin (CRESTOR) 5 MG tablet Take 1 tablet (5 mg total) by mouth once daily.    tramadol (ULTRAM) 50 mg tablet Take 1 tablet (50 mg total) by mouth every 12 (twelve) hours as needed for Pain.     Family History     Problem Relation (Age of Onset)    Heart disease Mother    Hypertension Mother    Skin cancer Mother        Social History Main Topics    Smoking status: Current Every Day Smoker     Packs/day: 1.50    Smokeless tobacco: Never Used    Alcohol use Yes      Comment: 12 pack every 3 days    Drug use: No    Sexual activity: Yes     Partners: Male     Review of Systems   Constitutional: Positive for fatigue. Negative for activity change, appetite change, chills, fever and unexpected weight change.   HENT: Negative for rhinorrhea and sore throat.    Eyes: Negative for pain and visual disturbance.   Respiratory: Negative for cough and shortness of breath.    Cardiovascular: Negative for chest pain and palpitations.   Gastrointestinal: Negative for abdominal pain, diarrhea and nausea.   Genitourinary: Negative for dysuria, hematuria and urgency.   Musculoskeletal: Negative for gait problem and neck stiffness.   Skin: Negative for rash.   Neurological: Positive for syncope. Negative for light-headedness and headaches.     Objective:     Vital Signs (Most Recent):  Temp: 98.1 °F (36.7 °C) (06/24/18 1153)  Pulse: 80 (06/24/18 1400)  Resp: 18 (06/24/18 1153)  BP: 122/67 (06/24/18 1153)  SpO2: (!) 90 % (06/24/18 1153) Vital Signs (24h Range):  Temp:  [97.8 °F (36.6 °C)-98.4 °F (36.9 °C)] 98.1 °F (36.7 °C)  Pulse:  [65-98] 80  Resp:  [16-20] 18  SpO2:  [90 %-99 %] 90 %  BP: (122-205)/(67-96) 122/67     Weight: 105.7 kg (233 lb 0.4 oz)  Body mass index is 36.5 kg/m².    Physical Exam   Constitutional: She is oriented to person, place,  and time. She appears well-developed and well-nourished.   Cardiovascular: Normal rate, regular rhythm and normal heart sounds.  Exam reveals no gallop and no friction rub.    No murmur heard.  Pulmonary/Chest: Effort normal and breath sounds normal. No respiratory distress. She has no wheezes. She has no rales.   Abdominal: Soft. Bowel sounds are normal. She exhibits no distension. There is no tenderness.   Musculoskeletal:   Left arm in sling    Neurological: She is alert and oriented to person, place, and time. She displays normal reflexes. No cranial nerve deficit. Coordination normal.   Skin: Skin is warm and dry.           Significant Labs:   CBC:   Recent Labs  Lab 06/24/18  0141   WBC 19.27*   HGB 9.8*   HCT 30.7*   *     CMP:   Recent Labs  Lab 06/24/18  0141 06/24/18  0817 06/24/18  1345   * 122* 128*   K 3.0* 3.3* 3.8   CL 85* 88* 92*   CO2 21* 23 24   * 117* 103   BUN 12 10 10   CREATININE 0.7 0.6 0.7   CALCIUM 9.4 8.8 9.5   PROT 7.9  --   --    ALBUMIN 4.0  --   --    BILITOT 0.4  --   --    ALKPHOS 66  --   --    AST 24  --   --    ALT 17  --   --    ANIONGAP 15 11 12   EGFRNONAA >60.0 >60.0 >60.0       Significant Imaging: I have reviewed and interpreted all pertinent imaging results/findings within the past 24 hours.    Assessment/Plan:     * Syncope    Pt with a syncopal episode - likely related to orthostasis given labs hypovolemic hyponatremia   - Will check echo   - monitor on telemetry for arrhythmia   - ddx includes seizure given BM; will check spot EEG         Hyponatremia    Likely hypovolemic hyponatremia 2/2 to diuretics - hctz. Also increased urination with etoh.     - Will give IVF; goal for correction is  4-6 meq/24hrs; no more than 8-10meq   - will monitor Na q6 hrs   - serum osms, urine osms, urine na . However urine na difficult to interpret in setting of recent diuretic use           Closed fracture of proximal end of left humerus    Orthopedics following.    - pan control with perocet 5-325   - Ortho to follow up pt in clinic in a week to discuss further management and possibly need surgical intervention  -CT scan pending         Depression    Stable   Cont home citalopram           VTE Risk Mitigation         Ordered     IP VTE HIGH RISK PATIENT  Once      06/24/18 0504     Place sequential compression device  Until discontinued      06/24/18 0504        PPx; lovenox   Dispo: pending w/u of syncope and improvement of hyponatremia     Mounika Torres MD  Department of Hospital Medicine   [facility]

## 2018-06-24 NOTE — ASSESSMENT & PLAN NOTE
Pt with a syncopal episode - likely related to orthostasis given labs hypovolemic hyponatremia   - Will check echo   - monitor on telemetry for arrhythmia   - ddx includes seizure given BM; will check spot EEG

## 2018-06-24 NOTE — CONSULTS
"[facility]  Orthopedics  Consult Note    Patient Name: Shannon Coleman  MRN: [id number]  Admission Date: 6/24/2018  Hospital Length of Stay: 0 days  Attending Provider: Mimi De Dios MD  Primary Care Provider: Ramon Box MD    Patient information was obtained from patient and ER records.     Inpatient consult to Orthopedic Surgery  Consult performed by: DANIELA JUAREZ  Consult ordered by: MIMI DE DIOS        Subjective:     Principal Problem:<principal problem not specified>    Chief Complaint:   Chief Complaint   Patient presents with    Loss of Consciousness     Pt presents to ED c/o "passing out" in the bathroom. Pt denies head injury. Reports feeling dehydrated. C/o feeling anxious.         HPI: Shannon Coleman is a 57 y.o. female smoker who presents to the ED after sustaining a fall and loss of consciousness earlier today.  Patient has been ill recently and states she is dehydrated.  She fell onto her left shoulder.  Orthopedics was counseled to for concern for shoulder dislocation and for a proximal humerus fracture on the left side.  Patient has pain in the left shoulder but otherwise denies pain anywhere else and she denies numbness and tingling.    Past Medical History:   Diagnosis Date    Allergy     Anxiety     Arthritis     Depression     GERD (gastroesophageal reflux disease)     Hyperlipidemia     Hypertension     Tobacco abuse     Vitamin D deficiency disease        Past Surgical History:   Procedure Laterality Date    child birth         Review of patient's allergies indicates:  No Known Allergies    No current facility-administered medications for this encounter.      Current Outpatient Prescriptions   Medication Sig    amlodipine (NORVASC) 10 MG tablet TAKE 1 TABLET (10 MG TOTAL) BY MOUTH ONCE DAILY.    amlodipine (NORVASC) 10 MG tablet TAKE 1 TABLET (10 MG TOTAL) BY MOUTH ONCE DAILY.    amLODIPine (NORVASC) 10 MG tablet Take 1 tablet (10 mg total) by mouth " once daily.    amLODIPine (NORVASC) 10 MG tablet TAKE 1 TABLET (10 MG TOTAL) BY MOUTH ONCE DAILY.    atorvastatin (LIPITOR) 20 MG tablet TAKE 1 TABLET (20 MG TOTAL) BY MOUTH ONCE DAILY.    atorvastatin (LIPITOR) 20 MG tablet TAKE 1 TABLET (20 MG TOTAL) BY MOUTH ONCE DAILY.    cetirizine (ZYRTEC) 10 MG tablet Take 1 tablet (10 mg total) by mouth every evening.    citalopram (CELEXA) 40 MG tablet Take 1 tablet (40 mg total) by mouth once daily.    clonazePAM (KLONOPIN) 1 MG tablet Take 1 tablet (1 mg total) by mouth every evening.    diclofenac sodium (VOLTAREN) 1 % Gel Apply 2 g topically 4 (four) times daily.    ergocalciferol (ERGOCALCIFEROL) 50,000 unit Cap Take 1 capsule (50,000 Units total) by mouth every 7 days.    fenofibrate 160 MG Tab Take 1 tablet (160 mg total) by mouth once daily.    fenofibrate 160 MG Tab TAKE 1 TABLET BY MOUTH EVERY DAY    fenofibrate 160 MG Tab TAKE 1 TABLET (160 MG TOTAL) BY MOUTH ONCE DAILY.    fenofibrate 160 MG Tab TAKE ONE(1) TABLET BY MOUTH EVERY DAY    fenofibrate 160 MG Tab TAKE ONE(1) TABLET BY MOUTH EVERY DAY    fenofibrate 160 MG Tab TAKE ONE(1) TABLET BY MOUTH EVERY DAY    hydrocortisone 2.5 % ointment Apply topically 2 (two) times daily. To affected area    hydrocortisone 2.5 % ointment Apply topically 2 (two) times daily. To affected area    hydrocortisone 2.5 % ointment Apply topically 2 (two) times daily. To affected area    losartan-hydrochlorothiazide 100-25 mg (HYZAAR) 100-25 mg per tablet Take 1 tablet by mouth every morning.    losartan-hydrochlorothiazide 100-25 mg (HYZAAR) 100-25 mg per tablet Take 1 tablet by mouth every morning.    losartan-hydrochlorothiazide 100-25 mg (HYZAAR) 100-25 mg per tablet Take 1 tablet by mouth every morning.    losartan-hydrochlorothiazide 100-25 mg (HYZAAR) 100-25 mg per tablet Take 1 tablet by mouth every morning.    losartan-hydrochlorothiazide 100-25 mg (HYZAAR) 100-25 mg per tablet TAKE ONE(1) TABLET BY  MOUTH EVERY MORNING    losartan-hydrochlorothiazide 100-25 mg (HYZAAR) 100-25 mg per tablet Take 1 tablet by mouth every morning.    losartan-hydrochlorothiazide 100-25 mg (HYZAAR) 100-25 mg per tablet TAKE 1 TABLET BY MOUTH EVERY MORNING.    meclizine (ANTIVERT) 25 mg tablet Take 1 tablet (25 mg total) by mouth 3 (three) times daily as needed for Nausea.    meloxicam (MOBIC) 7.5 MG tablet Take 1 tablet (7.5 mg total) by mouth once daily.    metformin (GLUCOPHAGE) 500 MG tablet Take 1 tablet (500 mg total) by mouth daily with breakfast.    metFORMIN (GLUCOPHAGE) 500 MG tablet TAKE 1 TABLET (500 MG TOTAL) BY MOUTH DAILY WITH BREAKFAST.    metFORMIN (GLUCOPHAGE) 500 MG tablet TAKE 1 TABLET (500 MG TOTAL) BY MOUTH DAILY WITH BREAKFAST.    metoprolol succinate (TOPROL-XL) 25 MG 24 hr tablet TAKE 1 TABLET (25 MG TOTAL) BY MOUTH ONCE DAILY.    metoprolol succinate (TOPROL-XL) 25 MG 24 hr tablet TAKE 1 TABLET BY MOUTH EVERY DAY    metoprolol succinate (TOPROL-XL) 25 MG 24 hr tablet Take 1 tablet (25 mg total) by mouth once daily.    metoprolol succinate (TOPROL-XL) 25 MG 24 hr tablet Take 1 tablet (25 mg total) by mouth once daily.    metoprolol succinate (TOPROL-XL) 25 MG 24 hr tablet TAKE 1 TABLET (25 MG TOTAL) BY MOUTH ONCE DAILY.    MULTIVITS,CA,MINERALS/IRON/FA (ONE-A-DAY WOMENS FORMULA ORAL) Take 1 tablet by mouth once daily.    omega-3 fatty acids-vitamin E (FISH OIL) 1,000 mg Cap Take 2 capsules by mouth once daily.    omeprazole (PRILOSEC) 20 MG capsule TAKE ONE CAPSULE BY MOUTH EVERY DAY    paroxetine (PAXIL) 40 MG tablet TAKE 1 TABLET (40 MG TOTAL) BY MOUTH EVERY MORNING.    rosuvastatin (CRESTOR) 5 MG tablet Take 1 tablet (5 mg total) by mouth once daily.    tramadol (ULTRAM) 50 mg tablet Take 1 tablet (50 mg total) by mouth every 12 (twelve) hours as needed for Pain.     Family History     Problem Relation (Age of Onset)    Heart disease Mother    Hypertension Mother    Skin cancer Mother     "    Social History Main Topics    Smoking status: Current Every Day Smoker     Packs/day: 1.50    Smokeless tobacco: Never Used    Alcohol use Yes      Comment: 12 pack every 3 days    Drug use: No    Sexual activity: Yes     Partners: Male     ROS     Constitutional: Negative for chills and fever.   HENT: Negative for facial swelling and sore throat.    Eyes: Negative for pain and redness.   Respiratory: Negative for shortness of breath.    Cardiovascular: Negative for chest pain.   Gastrointestinal: Positive for diarrhea, nausea and vomiting.   Genitourinary: Negative for dysuria.   Musculoskeletal: Positive for joint swelling. Negative for back pain and neck pain.   Skin: Negative for rash and wound.   Neurological: Positive for syncope. Negative for weakness.   Hematological: Does not bruise/bleed easily.   All other systems reviewed and are negative.  Objective:     Vital Signs (Most Recent):  Temp: 98.4 °F (36.9 °C) (06/24/18 0018)  Pulse: 82 (06/24/18 0501)  Resp: 20 (06/24/18 0018)  BP: (!) 172/79 (06/24/18 0501)  SpO2: 97 % (06/24/18 0501) Vital Signs (24h Range):  Temp:  [98.4 °F (36.9 °C)] 98.4 °F (36.9 °C)  Pulse:  [82-98] 82  Resp:  [20] 20  SpO2:  [97 %-99 %] 97 %  BP: (172-205)/(79-96) 172/79        Height: 5' 7" (170.2 cm)  There is no height or weight on file to calculate BMI.    No intake or output data in the 24 hours ending 06/24/18 0528    Ortho/SPM Exam    Gen:  No acute distress  CV:  Peripherally well-perfused.  Pulses 2+ bilaterally.  Lungs:  Normal respiratory effort.  Abdomen:  Soft, non-tender, non-distended  Head/Neck:  Normocephalic.  Atraumatic. No TTP, AROM and PROM intact without pain  Neuro:  CN intact without deficit, SILT throughout B/L Upper & Lower Extremities  Pelvis: No TTP, Stable to direct anterior pressure over ASIS.    MSK:  LUE:  - There is no obvious deformity there is severe tenderness to palpation at the left shoulder,  she does have very large arms  -Patient was " only minimally able to move her left shoulder due to pain,  passive range of motion was also difficult due to pain  - AROM and PROM of the elbow wrist and MCP PIP and DIP is intact without pain  - AIN/PIN/Radial/Median/Ulnar Nerves assessed in isolation without deficit  - SILT throughout  - Radial & Ulnar arteries palpated 2+  - Capillary Refill <3s    Bilateral LE:  - No obvious deformity no tenderness to palpation logroll negative  - AROM and PROM intact.  - TA/EHL/Gastroc/FHL assessed in isolation without deficit  - SILT throughout  - DP and PT palpated  2+  - Capillary Refill <3s        Significant Labs: All pertinent labs within the past 24 hours have been reviewed.    Significant Imaging: I have reviewed and interpreted all pertinent imaging results/findings.   Comminuted and displaced left proximal humerus fracture with shortening there is no glenohumeral joint dislocation    Assessment/Plan:     Closed fracture of proximal end of left humerus    Shannon Coleman is a 57 y.o. female with a left proximal humerus fracture that is comminuted displaced and shortened.    -Fractures significantly shortened so patient will need aggressive pain control and especially muscle relaxants  -Patient is going to be admitted to the hospital for hyponatremia  -orthopedics will follow along while she is in the hospital   -We will have her follow up in clinic in a week to discuss further management will possibly need surgical intervention  -Would be best for patient to sleep at an incline this will likely help with her pain and she should be placed in a sling and swath            Thank you for your consult. I will follow-up with patient. Please contact us if you have any additional questions.    Adonis Boswell MD  Orthopedics  [facility]

## 2018-06-24 NOTE — ASSESSMENT & PLAN NOTE
Orthopedics following.   - pan control with perocet 5-325   - Ortho to follow up pt in clinic in a week to discuss further management and possibly need surgical intervention  -CT scan pending

## 2018-06-24 NOTE — ED PROVIDER NOTES
"Encounter Date: 6/24/2018       History     Chief Complaint   Patient presents with    Loss of Consciousness     Pt presents to ED c/o "passing out" in the bathroom. Pt denies head injury. Reports feeling dehydrated. C/o feeling anxious.      HPI     Shannon Coleman is a 58 yo F with PMH of GERD, HLD, 30 pack/year smoking hx, HTN presenting with LoC. The patient reports that she was sitting on the porch, stood up and walked to the backroom and when she got to the bathroom she fell to the ground on her left side and LoC for several seconds with stool incontinence and spontaneous return of consciousness. She reports that she has had recent diarrhea, nausea and vomiting for two days. The patient denies fevers, chills, CP, SOB. She denies head trauma but endorses left shoulder and humerus pain. She endorses drinking 4 beers today.     Review of patient's allergies indicates:  No Known Allergies  Past Medical History:   Diagnosis Date    Allergy     Anxiety     Arthritis     Depression     GERD (gastroesophageal reflux disease)     Hyperlipidemia     Hypertension     Tobacco abuse     Vitamin D deficiency disease      Past Surgical History:   Procedure Laterality Date    child birth       Family History   Problem Relation Age of Onset    Heart disease Mother     Hypertension Mother     Skin cancer Mother      Social History   Substance Use Topics    Smoking status: Current Every Day Smoker     Packs/day: 1.50    Smokeless tobacco: Never Used    Alcohol use Yes      Comment: 12 pack every 3 days     Review of Systems   Constitutional: Negative for chills and fever.   HENT: Negative for facial swelling and sore throat.    Eyes: Negative for pain and redness.   Respiratory: Negative for shortness of breath.    Cardiovascular: Negative for chest pain.   Gastrointestinal: Positive for diarrhea, nausea and vomiting.   Genitourinary: Negative for dysuria.   Musculoskeletal: Positive for joint swelling. Negative " for back pain and neck pain.   Skin: Negative for rash and wound.   Neurological: Positive for syncope. Negative for weakness.   Hematological: Does not bruise/bleed easily.   All other systems reviewed and are negative.      Physical Exam     Initial Vitals [06/24/18 0018]   BP Pulse Resp Temp SpO2   (!) 195/88 98 20 98.4 °F (36.9 °C) 97 %      MAP       --         Physical Exam    Nursing note and vitals reviewed.  Constitutional: She appears well-developed and well-nourished.   Patient sitting up in bed, obese, appears uncomfortable, anxious   HENT:   Head: Normocephalic and atraumatic.   Eyes: EOM are normal. Pupils are equal, round, and reactive to light.   Neck: Normal range of motion. Neck supple. No JVD present.   Cardiovascular: Normal rate, regular rhythm, normal heart sounds and intact distal pulses. Exam reveals no gallop and no friction rub.    No murmur heard.  Pulses equal in upper and lower extremities   Pulmonary/Chest: Breath sounds normal. No respiratory distress. She has no wheezes. She has no rhonchi. She has no rales. She exhibits no tenderness.   Abdominal: Bowel sounds are normal. She exhibits no distension and no mass. There is no tenderness. There is no rebound and no guarding.   Musculoskeletal: Normal range of motion. She exhibits no edema.   Left shoulder, humeral, flank TTP   Lymphadenopathy:     She has no cervical adenopathy.   Neurological: She is alert and oriented to person, place, and time. No cranial nerve deficit.   Strength 5/5 grossly throughout, sensation grossly intact throughout   Skin: Skin is warm and dry. Capillary refill takes less than 2 seconds.         ED Course   Procedures  Labs Reviewed   CBC W/ AUTO DIFFERENTIAL - Abnormal; Notable for the following:        Result Value    WBC 19.27 (*)     Hemoglobin 9.8 (*)     Hematocrit 30.7 (*)     MCV 71 (*)     MCH 22.7 (*)     MCHC 31.9 (*)     RDW 19.7 (*)     Platelets 417 (*)     MPV 9.0 (*)     Immature Granulocytes  1.1 (*)     Gran # (ANC) 16.1 (*)     Immature Grans (Abs) 0.22 (*)     Mono # 1.2 (*)     Gran% 83.8 (*)     Lymph% 8.4 (*)     All other components within normal limits   COMPREHENSIVE METABOLIC PANEL - Abnormal; Notable for the following:     Sodium 121 (*)     Potassium 3.0 (*)     Chloride 85 (*)     CO2 21 (*)     Glucose 157 (*)     All other components within normal limits   URINALYSIS - Abnormal; Notable for the following:     Protein, UA 2+ (*)     Occult Blood UA 1+ (*)     All other components within normal limits   TOXICOLOGY SCREEN, URINE, RANDOM (COMPLIANCE) - Abnormal; Notable for the following:     Alcohol, Urine 29 (*)     All other components within normal limits   D DIMER, QUANTITATIVE - Abnormal; Notable for the following:     D-Dimer 1.05 (*)     All other components within normal limits    Narrative:     Add on per Dr Conway order #[id number] 06/24/2018  03:03 DDIMR   POCT GLUCOSE - Abnormal; Notable for the following:     POCT Glucose 153 (*)     All other components within normal limits   TROPONIN I   B-TYPE NATRIURETIC PEPTIDE   D DIMER, QUANTITATIVE   ALCOHOL,MEDICAL (ETHANOL)   ALCOHOL,MEDICAL (ETHANOL)   URINALYSIS MICROSCOPIC   TROPONIN I     EKG Readings: (Independently Interpreted)   Initial Reading: No STEMI. Heart Rate: 71. ST Segments: Normal ST Segments. T Waves: Normal. Axis: Normal.       Imaging Results          CT Shoulder Without Contrast Left (Final result)  Result time 06/24/18 12:17:05    Final result by Hima Bae MD (06/24/18 12:17:05)                 Impression:      Comminuted fracture of the surgical neck of the left humerus with retraction and mild anteromedial displacement of the major fracture fragment.    Electronically signed by resident: Tex Petit  Date:    06/24/2018  Time:    11:22    Electronically signed by: Hima Bae MD  Date:    06/24/2018  Time:    12:17             Narrative:    EXAMINATION:  CT SHOULDER WITHOUT CONTRAST LEFT    CLINICAL  HISTORY:  Fracture, shoulder;NEED 2MM CUTS WITH STANDARD 3D RECONS AND also 3d recons of just humerus alone;    TECHNIQUE:  Noncontrast axial images of the left shoulder and proximal humerus was obtained.  Coronal and sagittal reformations performed.  3D reconstructions of the humerus was performed.    COMPARISON:  X-ray shoulder and humerus 06/24/2018    FINDINGS:  Comminuted fracture of the surgical neck of the left humerus with retraction and mild anteromedial displacement of the major fracture fragment.  No intra-articular extension.  No subluxation of the glenohumeral joint.  There is edema and stranding of the deltoid musculature.  No suspicious bone lesions identified.                               X-Ray Shoulder 2 or More Views Left (Final result)  Result time 06/24/18 05:17:27    Final result by Randy Rod MD (06/24/18 05:17:27)                 Impression:      1. Worsening superior displacement of the proximal humeral fracture.      Electronically signed by: Randy Rod MD  Date:    06/24/2018  Time:    05:17             Narrative:    EXAMINATION:  XR SHOULDER COMPLETE 2 OR MORE VIEWS LEFT    CLINICAL HISTORY:  ONE VIEW.  NEED AXILLARY LATERAL OR VELPAEU;    TECHNIQUE:  Two views of the left shoulder were performed.    COMPARISON:  Radiograph 06/24/2018.    FINDINGS:  There is worsening superior displacement of the proximal humeral fracture.  Otherwise no change.                               X-Ray Shoulder 2 or More Views Left (Final result)  Result time 06/24/18 04:33:59    Final result by Randy Rod MD (06/24/18 04:33:59)                 Impression:      1. Complete, displaced multipart fracture of the left proximal humerus.  No glenohumeral dislocation.      Electronically signed by: Randy Rod MD  Date:    06/24/2018  Time:    04:33             Narrative:    EXAMINATION:  XR SHOULDER COMPLETE 2 OR MORE VIEWS LEFT; XR HUMERUS 2 VIEW LEFT    CLINICAL HISTORY:  fall from  standing;    TECHNIQUE:  Two or three views of the left shoulder were performed.    COMPARISON:  None    FINDINGS:  There is a complete, displaced multipart fracture of the left proximal humerus.  No glenohumeral dislocation.  AC joint is congruent.  Soft tissues are unremarkable.  Left hemithorax is clear.                               X-Ray Humerus 2 View Left (Final result)  Result time 06/24/18 04:33:59    Final result by Randy Rod MD (06/24/18 04:33:59)                 Impression:      1. Complete, displaced multipart fracture of the left proximal humerus.  No glenohumeral dislocation.      Electronically signed by: Randy Rod MD  Date:    06/24/2018  Time:    04:33             Narrative:    EXAMINATION:  XR SHOULDER COMPLETE 2 OR MORE VIEWS LEFT; XR HUMERUS 2 VIEW LEFT    CLINICAL HISTORY:  fall from standing;    TECHNIQUE:  Two or three views of the left shoulder were performed.    COMPARISON:  None    FINDINGS:  There is a complete, displaced multipart fracture of the left proximal humerus.  No glenohumeral dislocation.  AC joint is congruent.  Soft tissues are unremarkable.  Left hemithorax is clear.                               X-Ray Chest 1 View (Final result)  Result time 06/24/18 04:02:39   Procedure changed from X-Ray Chest PA And Lateral     Final result by Daren Maria MD (06/24/18 04:02:39)                 Impression:      No acute findings in the chest.      Electronically signed by: Daren Maria MD  Date:    06/24/2018  Time:    04:02             Narrative:    EXAMINATION:  XR CHEST 1 VIEW    CLINICAL HISTORY:  Chest Pain;    TECHNIQUE:  Single frontal view of the chest was performed.    COMPARISON:  None    FINDINGS:  No consolidation, pleural effusion or pneumothorax.    Cardiomediastinal silhouette is magnified by technique.                                 Medical Decision Making:   History:   Old Medical Records: I decided to obtain old medical records.  Old Records  Summarized: records from previous admission(s).  Initial Assessment:   Shannon Coleman is a 56 yo F with PMH of GERD, HLD, 30 pack/year smoking hx, HTN presenting with LoC and LUE TTP.  Differential Diagnosis:   ACS, arrhythmia, aortic dissection, vasovagal syncope, orthostatic hypotension, seizure  ED Management:  Chest pain work up: trop, BNP, CXR, EKG. Ddimer to rule out dissection given low pre-test probability. Seizure possible but less likely given no shaking episode and no hx seizure. Dispo pending work up.     Patient hyponatremic to 121, AG of 15, intractable N/V, L humeral neck fx with dislocation. Ortho consulted, dispo to the medicine floor for reduction, conscious sedation and tele.         Attending Attestation:   Physician Attestation Statement for Resident:  As the supervising MD   Physician Attestation Statement: I have personally seen and examined this patient.   I agree with the above history. -:   As the supervising MD I agree with the above PE.    As the supervising MD I agree with the above treatment, course, plan, and disposition.   -: Emergent evaluation 57-year-old female presenting for evaluation of syncopal episode and left shoulder pain. EKG shows no evidence of arrhythmia.  Labs reviewed and significant for hyponatremia.  Question of seizure versus syncopal episode.  Patient does have a history of alcohol abuse.  Orthopedics consulted for humeral fracture.  Patient placed in a splint.  Will admit for further treatment and evaluation  I have reviewed and agree with the residents interpretation of the following: lab data, EKG and x-rays.          Physician Attestation for Scribe:      Comments: I, Dr. Mimi Dailey, personally performed the services described in this documentation. All medical record entries made by the scribe were at my direction and in my presence.  I have reviewed the chart and agree that the record reflects my personal performance and is accurate and complete. Mimi  MD Ashlie.                 Clinical Impression:   Diagnoses of Anxiety, Chest pain, Fall, (HFpEF) heart failure with preserved ejection fraction, Syncope, unspecified syncope type, Closed fracture of proximal end of left humerus, unspecified fracture morphology, initial encounter, and Syncope were pertinent to this visit.      Disposition:   Disposition: Admitted  Condition: Fair                        Yash Leal MD  Resident  06/24/18 0433       Yash Leal MD  Resident  06/24/18 0434       Mimi Dailey MD  06/26/18 1525

## 2018-06-24 NOTE — ASSESSMENT & PLAN NOTE
Likely hypovolemic hyponatremia 2/2 to diuretics - hctz. Also increased urination with etoh.     - Will give IVF; goal for correction is  4-6 meq/24hrs; no more than 8-10meq   - will monitor Na q6 hrs   - serum osms, urine osms, urine na . However urine na difficult to interpret in setting of recent diuretic use

## 2018-06-24 NOTE — SUBJECTIVE & OBJECTIVE
Past Medical History:   Diagnosis Date    Allergy     Anxiety     Arthritis     Depression     GERD (gastroesophageal reflux disease)     Hyperlipidemia     Hypertension     Tobacco abuse     Vitamin D deficiency disease        Past Surgical History:   Procedure Laterality Date    child birth         Review of patient's allergies indicates:  No Known Allergies    No current facility-administered medications on file prior to encounter.      Current Outpatient Prescriptions on File Prior to Encounter   Medication Sig    amlodipine (NORVASC) 10 MG tablet TAKE 1 TABLET (10 MG TOTAL) BY MOUTH ONCE DAILY.    amlodipine (NORVASC) 10 MG tablet TAKE 1 TABLET (10 MG TOTAL) BY MOUTH ONCE DAILY.    amLODIPine (NORVASC) 10 MG tablet Take 1 tablet (10 mg total) by mouth once daily.    amLODIPine (NORVASC) 10 MG tablet TAKE 1 TABLET (10 MG TOTAL) BY MOUTH ONCE DAILY.    atorvastatin (LIPITOR) 20 MG tablet TAKE 1 TABLET (20 MG TOTAL) BY MOUTH ONCE DAILY.    atorvastatin (LIPITOR) 20 MG tablet TAKE 1 TABLET (20 MG TOTAL) BY MOUTH ONCE DAILY.    cetirizine (ZYRTEC) 10 MG tablet Take 1 tablet (10 mg total) by mouth every evening.    citalopram (CELEXA) 40 MG tablet Take 1 tablet (40 mg total) by mouth once daily.    clonazePAM (KLONOPIN) 1 MG tablet Take 1 tablet (1 mg total) by mouth every evening.    diclofenac sodium (VOLTAREN) 1 % Gel Apply 2 g topically 4 (four) times daily.    ergocalciferol (ERGOCALCIFEROL) 50,000 unit Cap Take 1 capsule (50,000 Units total) by mouth every 7 days.    fenofibrate 160 MG Tab Take 1 tablet (160 mg total) by mouth once daily.    fenofibrate 160 MG Tab TAKE 1 TABLET BY MOUTH EVERY DAY    fenofibrate 160 MG Tab TAKE 1 TABLET (160 MG TOTAL) BY MOUTH ONCE DAILY.    fenofibrate 160 MG Tab TAKE ONE(1) TABLET BY MOUTH EVERY DAY    fenofibrate 160 MG Tab TAKE ONE(1) TABLET BY MOUTH EVERY DAY    fenofibrate 160 MG Tab TAKE ONE(1) TABLET BY MOUTH EVERY DAY    hydrocortisone 2.5 %  ointment Apply topically 2 (two) times daily. To affected area    hydrocortisone 2.5 % ointment Apply topically 2 (two) times daily. To affected area    hydrocortisone 2.5 % ointment Apply topically 2 (two) times daily. To affected area    losartan-hydrochlorothiazide 100-25 mg (HYZAAR) 100-25 mg per tablet Take 1 tablet by mouth every morning.    losartan-hydrochlorothiazide 100-25 mg (HYZAAR) 100-25 mg per tablet Take 1 tablet by mouth every morning.    losartan-hydrochlorothiazide 100-25 mg (HYZAAR) 100-25 mg per tablet Take 1 tablet by mouth every morning.    losartan-hydrochlorothiazide 100-25 mg (HYZAAR) 100-25 mg per tablet Take 1 tablet by mouth every morning.    losartan-hydrochlorothiazide 100-25 mg (HYZAAR) 100-25 mg per tablet TAKE ONE(1) TABLET BY MOUTH EVERY MORNING    losartan-hydrochlorothiazide 100-25 mg (HYZAAR) 100-25 mg per tablet Take 1 tablet by mouth every morning.    losartan-hydrochlorothiazide 100-25 mg (HYZAAR) 100-25 mg per tablet TAKE 1 TABLET BY MOUTH EVERY MORNING.    meclizine (ANTIVERT) 25 mg tablet Take 1 tablet (25 mg total) by mouth 3 (three) times daily as needed for Nausea.    meloxicam (MOBIC) 7.5 MG tablet Take 1 tablet (7.5 mg total) by mouth once daily.    metformin (GLUCOPHAGE) 500 MG tablet Take 1 tablet (500 mg total) by mouth daily with breakfast.    metFORMIN (GLUCOPHAGE) 500 MG tablet TAKE 1 TABLET (500 MG TOTAL) BY MOUTH DAILY WITH BREAKFAST.    metFORMIN (GLUCOPHAGE) 500 MG tablet TAKE 1 TABLET (500 MG TOTAL) BY MOUTH DAILY WITH BREAKFAST.    metoprolol succinate (TOPROL-XL) 25 MG 24 hr tablet TAKE 1 TABLET (25 MG TOTAL) BY MOUTH ONCE DAILY.    metoprolol succinate (TOPROL-XL) 25 MG 24 hr tablet TAKE 1 TABLET BY MOUTH EVERY DAY    metoprolol succinate (TOPROL-XL) 25 MG 24 hr tablet Take 1 tablet (25 mg total) by mouth once daily.    metoprolol succinate (TOPROL-XL) 25 MG 24 hr tablet Take 1 tablet (25 mg total) by mouth once daily.    metoprolol  succinate (TOPROL-XL) 25 MG 24 hr tablet TAKE 1 TABLET (25 MG TOTAL) BY MOUTH ONCE DAILY.    MULTIVITS,CA,MINERALS/IRON/FA (ONE-A-DAY WOMENS FORMULA ORAL) Take 1 tablet by mouth once daily.    omega-3 fatty acids-vitamin E (FISH OIL) 1,000 mg Cap Take 2 capsules by mouth once daily.    omeprazole (PRILOSEC) 20 MG capsule TAKE ONE CAPSULE BY MOUTH EVERY DAY    paroxetine (PAXIL) 40 MG tablet TAKE 1 TABLET (40 MG TOTAL) BY MOUTH EVERY MORNING.    rosuvastatin (CRESTOR) 5 MG tablet Take 1 tablet (5 mg total) by mouth once daily.    tramadol (ULTRAM) 50 mg tablet Take 1 tablet (50 mg total) by mouth every 12 (twelve) hours as needed for Pain.     Family History     Problem Relation (Age of Onset)    Heart disease Mother    Hypertension Mother    Skin cancer Mother        Social History Main Topics    Smoking status: Current Every Day Smoker     Packs/day: 1.50    Smokeless tobacco: Never Used    Alcohol use Yes      Comment: 12 pack every 3 days    Drug use: No    Sexual activity: Yes     Partners: Male     Review of Systems   Constitutional: Positive for fatigue. Negative for activity change, appetite change, chills, fever and unexpected weight change.   HENT: Negative for rhinorrhea and sore throat.    Eyes: Negative for pain and visual disturbance.   Respiratory: Negative for cough and shortness of breath.    Cardiovascular: Negative for chest pain and palpitations.   Gastrointestinal: Negative for abdominal pain, diarrhea and nausea.   Genitourinary: Negative for dysuria, hematuria and urgency.   Musculoskeletal: Negative for gait problem and neck stiffness.   Skin: Negative for rash.   Neurological: Positive for syncope. Negative for light-headedness and headaches.     Objective:     Vital Signs (Most Recent):  Temp: 98.1 °F (36.7 °C) (06/24/18 1153)  Pulse: 80 (06/24/18 1400)  Resp: 18 (06/24/18 1153)  BP: 122/67 (06/24/18 1153)  SpO2: (!) 90 % (06/24/18 1153) Vital Signs (24h Range):  Temp:  [97.8 °F  (36.6 °C)-98.4 °F (36.9 °C)] 98.1 °F (36.7 °C)  Pulse:  [65-98] 80  Resp:  [16-20] 18  SpO2:  [90 %-99 %] 90 %  BP: (122-205)/(67-96) 122/67     Weight: 105.7 kg (233 lb 0.4 oz)  Body mass index is 36.5 kg/m².    Physical Exam   Constitutional: She is oriented to person, place, and time. She appears well-developed and well-nourished.   Cardiovascular: Normal rate, regular rhythm and normal heart sounds.  Exam reveals no gallop and no friction rub.    No murmur heard.  Pulmonary/Chest: Effort normal and breath sounds normal. No respiratory distress. She has no wheezes. She has no rales.   Abdominal: Soft. Bowel sounds are normal. She exhibits no distension. There is no tenderness.   Musculoskeletal:   Left arm in sling    Neurological: She is alert and oriented to person, place, and time. She displays normal reflexes. No cranial nerve deficit. Coordination normal.   Skin: Skin is warm and dry.           Significant Labs:   CBC:   Recent Labs  Lab 06/24/18  0141   WBC 19.27*   HGB 9.8*   HCT 30.7*   *     CMP:   Recent Labs  Lab 06/24/18  0141 06/24/18  0817 06/24/18  1345   * 122* 128*   K 3.0* 3.3* 3.8   CL 85* 88* 92*   CO2 21* 23 24   * 117* 103   BUN 12 10 10   CREATININE 0.7 0.6 0.7   CALCIUM 9.4 8.8 9.5   PROT 7.9  --   --    ALBUMIN 4.0  --   --    BILITOT 0.4  --   --    ALKPHOS 66  --   --    AST 24  --   --    ALT 17  --   --    ANIONGAP 15 11 12   EGFRNONAA >60.0 >60.0 >60.0       Significant Imaging: I have reviewed and interpreted all pertinent imaging results/findings within the past 24 hours.

## 2018-06-25 LAB
ANION GAP SERPL CALC-SCNC: 10 MMOL/L
ANION GAP SERPL CALC-SCNC: 9 MMOL/L
AORTIC VALVE STENOSIS: NORMAL
BASOPHILS # BLD AUTO: 0.05 K/UL
BASOPHILS NFR BLD: 0.5 %
BUN SERPL-MCNC: 13 MG/DL
BUN SERPL-MCNC: 13 MG/DL
BUN SERPL-MCNC: 14 MG/DL
BUN SERPL-MCNC: 15 MG/DL
CALCIUM SERPL-MCNC: 9.1 MG/DL
CALCIUM SERPL-MCNC: 9.2 MG/DL
CALCIUM SERPL-MCNC: 9.2 MG/DL
CALCIUM SERPL-MCNC: 9.4 MG/DL
CHLORIDE SERPL-SCNC: 97 MMOL/L
CHLORIDE SERPL-SCNC: 98 MMOL/L
CO2 SERPL-SCNC: 25 MMOL/L
CREAT SERPL-MCNC: 0.6 MG/DL
CREAT SERPL-MCNC: 0.6 MG/DL
CREAT SERPL-MCNC: 0.7 MG/DL
CREAT SERPL-MCNC: 0.7 MG/DL
DIFFERENTIAL METHOD: ABNORMAL
EOSINOPHIL # BLD AUTO: 0.1 K/UL
EOSINOPHIL NFR BLD: 1.3 %
ERYTHROCYTE [DISTWIDTH] IN BLOOD BY AUTOMATED COUNT: 20.5 %
EST. GFR  (AFRICAN AMERICAN): >60 ML/MIN/1.73 M^2
EST. GFR  (NON AFRICAN AMERICAN): >60 ML/MIN/1.73 M^2
ESTIMATED PA SYSTOLIC PRESSURE: 35.04
GLUCOSE SERPL-MCNC: 103 MG/DL
GLUCOSE SERPL-MCNC: 104 MG/DL
GLUCOSE SERPL-MCNC: 126 MG/DL
GLUCOSE SERPL-MCNC: 94 MG/DL
HCT VFR BLD AUTO: 28.6 %
HGB BLD-MCNC: 8.7 G/DL
IMM GRANULOCYTES # BLD AUTO: 0.06 K/UL
IMM GRANULOCYTES NFR BLD AUTO: 0.6 %
LYMPHOCYTES # BLD AUTO: 1.3 K/UL
LYMPHOCYTES NFR BLD: 13.8 %
MCH RBC QN AUTO: 22.3 PG
MCHC RBC AUTO-ENTMCNC: 30.4 G/DL
MCV RBC AUTO: 73 FL
MONOCYTES # BLD AUTO: 1.2 K/UL
MONOCYTES NFR BLD: 12 %
NEUTROPHILS # BLD AUTO: 6.9 K/UL
NEUTROPHILS NFR BLD: 71.8 %
NRBC BLD-RTO: 0 /100 WBC
PLATELET # BLD AUTO: 366 K/UL
PMV BLD AUTO: 9 FL
POTASSIUM SERPL-SCNC: 3.6 MMOL/L
POTASSIUM SERPL-SCNC: 3.8 MMOL/L
RBC # BLD AUTO: 3.9 M/UL
RETIRED EF AND QEF - SEE NOTES: 60 (ref 55–65)
SODIUM SERPL-SCNC: 131 MMOL/L
SODIUM SERPL-SCNC: 132 MMOL/L
SODIUM SERPL-SCNC: 132 MMOL/L
SODIUM SERPL-SCNC: 133 MMOL/L
WBC # BLD AUTO: 9.65 K/UL

## 2018-06-25 PROCEDURE — 93010 ELECTROCARDIOGRAM REPORT: CPT | Mod: ,,, | Performed by: INTERNAL MEDICINE

## 2018-06-25 PROCEDURE — 63600175 PHARM REV CODE 636 W HCPCS: Performed by: STUDENT IN AN ORGANIZED HEALTH CARE EDUCATION/TRAINING PROGRAM

## 2018-06-25 PROCEDURE — 85025 COMPLETE CBC W/AUTO DIFF WBC: CPT

## 2018-06-25 PROCEDURE — 99223 1ST HOSP IP/OBS HIGH 75: CPT | Mod: ,,, | Performed by: ORTHOPAEDIC SURGERY

## 2018-06-25 PROCEDURE — 99232 SBSQ HOSP IP/OBS MODERATE 35: CPT | Mod: ,,, | Performed by: HOSPITALIST

## 2018-06-25 PROCEDURE — 36415 COLL VENOUS BLD VENIPUNCTURE: CPT

## 2018-06-25 PROCEDURE — 80048 BASIC METABOLIC PNL TOTAL CA: CPT | Mod: 91

## 2018-06-25 PROCEDURE — 63700000 PHARM REV CODE 250 ALT 637 W/O HCPCS: Performed by: HOSPITALIST

## 2018-06-25 PROCEDURE — 25000003 PHARM REV CODE 250: Performed by: HOSPITALIST

## 2018-06-25 PROCEDURE — 80048 BASIC METABOLIC PNL TOTAL CA: CPT

## 2018-06-25 PROCEDURE — 93306 TTE W/DOPPLER COMPLETE: CPT

## 2018-06-25 PROCEDURE — 93005 ELECTROCARDIOGRAM TRACING: CPT

## 2018-06-25 PROCEDURE — 93306 TTE W/DOPPLER COMPLETE: CPT | Mod: 26,,, | Performed by: INTERNAL MEDICINE

## 2018-06-25 PROCEDURE — 11000001 HC ACUTE MED/SURG PRIVATE ROOM

## 2018-06-25 RX ORDER — DEXTROSE MONOHYDRATE 50 MG/ML
INJECTION, SOLUTION INTRAVENOUS CONTINUOUS
Status: DISCONTINUED | OUTPATIENT
Start: 2018-06-25 | End: 2018-06-25

## 2018-06-25 RX ORDER — ENOXAPARIN SODIUM 100 MG/ML
40 INJECTION SUBCUTANEOUS EVERY 24 HOURS
Status: COMPLETED | OUTPATIENT
Start: 2018-06-25 | End: 2018-06-25

## 2018-06-25 RX ADMIN — METOPROLOL SUCCINATE 25 MG: 25 TABLET, EXTENDED RELEASE ORAL at 10:06

## 2018-06-25 RX ADMIN — CITALOPRAM HYDROBROMIDE 40 MG: 40 TABLET ORAL at 10:06

## 2018-06-25 RX ADMIN — OXYCODONE HYDROCHLORIDE AND ACETAMINOPHEN 1 TABLET: 5; 325 TABLET ORAL at 01:06

## 2018-06-25 RX ADMIN — DEXTROSE 250 ML: 5 SOLUTION INTRAVENOUS at 01:06

## 2018-06-25 RX ADMIN — DEXTROSE 250 ML: 5 SOLUTION INTRAVENOUS at 10:06

## 2018-06-25 RX ADMIN — FENOFIBRATE 160 MG: 160 TABLET ORAL at 10:06

## 2018-06-25 RX ADMIN — OXYCODONE HYDROCHLORIDE AND ACETAMINOPHEN 1 TABLET: 5; 325 TABLET ORAL at 06:06

## 2018-06-25 RX ADMIN — ENOXAPARIN SODIUM 40 MG: 100 INJECTION SUBCUTANEOUS at 05:06

## 2018-06-25 RX ADMIN — OXYCODONE HYDROCHLORIDE AND ACETAMINOPHEN 1 TABLET: 5; 325 TABLET ORAL at 08:06

## 2018-06-25 NOTE — HOSPITAL COURSE
Admitted to hospital medicine for hyponatremia and syncope. Also noted to have a left proximal humerus fracture. Orthopedics consulted and following. Na improved with IVF. Echo done for syncope workup w/o AS or WMA. Normal EF. Plan for ORIF on 6/27 per ortho.

## 2018-06-25 NOTE — PROGRESS NOTES
"[facility] Hospital Medicine  Progress Note    Patient Name: Shannon Coleman  MRN: [id number]  Patient Class: IP- Inpatient   Admission Date: 6/24/2018  Length of Stay: 1 days  Attending Physician: Mounika Torres MD  Primary Care Provider: Ramon Box MD    [facility] Medicine Team: [facility] Mounika Torres MD    Subjective:     Principal Problem:Syncope    HPI:  57 year old woman with htn on HCTZ who presents after a syncopal episode. Pt states she felt well and was in her normal state of health throughout the day yesterday. She was sitting in her chair outside with her  when she got up to go to the bathroom for a bowel movement. She states she walked into the bathroom and suddenly felt light headed and dizzy then passed out. When she awoke, she denies any confusion but states she had soiled herself. Her  found her and helped her up. She states she continued to feel weak and "drained" but was able to  shower with assistance from her  and go to bed. She reports since the fall her left arm hurt and increasingly became worse thus she went to the ED for evaluation. She does report normal PO intake but reports she did have 4 beers earlier in the afternoon which did cause increased urinary frequency.  She denies any chest pain or palpitations prior to syncopal episode. She also denies any sob, headache, focal deficits, paraesthesias, or tongue biting.     Hospital Course:  Admitted to hospital medicine for hyponatremia and syncope. Also noted to have a left proximal humerus fracture. Orthopedics consulted and following. Na improved with IVF. Echo done for syncope workup w/o AS or WMA. Normal EF. Plan for ORIF on 6/27 per ortho.     Interval History: P feels overall improved today. Complains of left arm pain. No events on tele.     Review of Systems   Constitutional: Positive for fatigue. Negative for activity change, appetite change, chills, fever and unexpected weight " change.   HENT: Negative for rhinorrhea and sore throat.    Eyes: Negative for pain and visual disturbance.   Respiratory: Negative for cough and shortness of breath.    Cardiovascular: Negative for chest pain and palpitations.   Gastrointestinal: Negative for abdominal pain, diarrhea and nausea.   Genitourinary: Negative for dysuria, hematuria and urgency.   Musculoskeletal: Negative for gait problem and neck stiffness.   Skin: Negative for rash.   Neurological: Positive for syncope. Negative for light-headedness and headaches.     Objective:     Vital Signs (Most Recent):  Temp: 98 °F (36.7 °C) (06/25/18 1025)  Pulse: 81 (06/25/18 1600)  Resp: 17 (06/25/18 1600)  BP: 125/78 (06/25/18 1600)  SpO2: 97 % (06/25/18 1600) Vital Signs (24h Range):  Temp:  [98 °F (36.7 °C)-98.4 °F (36.9 °C)] 98 °F (36.7 °C)  Pulse:  [] 81  Resp:  [15-22] 17  SpO2:  [90 %-97 %] 97 %  BP: (125-145)/(78-91) 125/78     Weight: 105.7 kg (233 lb 0.4 oz)  Body mass index is 36.5 kg/m².    Intake/Output Summary (Last 24 hours) at 06/25/18 1739  Last data filed at 06/25/18 1400   Gross per 24 hour   Intake              920 ml   Output              500 ml   Net              420 ml      Physical Exam   Constitutional: She is oriented to person, place, and time. She appears well-developed and well-nourished.   Cardiovascular: Normal rate, regular rhythm and normal heart sounds.  Exam reveals no gallop and no friction rub.    No murmur heard.  Pulmonary/Chest: Effort normal and breath sounds normal. No respiratory distress. She has no wheezes. She has no rales.   Abdominal: Soft. Bowel sounds are normal. She exhibits no distension. There is no tenderness.   Musculoskeletal:   Left arm in sling    Neurological: She is alert and oriented to person, place, and time. She displays normal reflexes. No cranial nerve deficit. Coordination normal.   Skin: Skin is warm and dry.       Computed MELD-Na score unavailable. Necessary lab results were not  found in the last year.  Computed MELD score unavailable. Necessary lab results were not found in the last year.    Significant Labs:  CBC:    Recent Labs  Lab 06/24/18  0141 06/25/18  0924   WBC 19.27* 9.65   HGB 9.8* 8.7*   HCT 30.7* 28.6*   * 366*     CMP:    Recent Labs  Lab 06/24/18  0141  06/25/18  0023 06/25/18  0721 06/25/18  1204   *  < > 131* 133* 132*   K 3.0*  < > 3.6 3.6 3.8   CL 85*  < > 97 98 97   CO2 21*  < > 25 25 25   *  < > 126* 103 104   BUN 12  < > 15 13 13   CREATININE 0.7  < > 0.7 0.6 0.7   CALCIUM 9.4  < > 9.1 9.2 9.4   PROT 7.9  --   --   --   --    ALBUMIN 4.0  --   --   --   --    BILITOT 0.4  --   --   --   --    ALKPHOS 66  --   --   --   --    AST 24  --   --   --   --    ALT 17  --   --   --   --    ANIONGAP 15  < > 9 10 10   EGFRNONAA >60.0  < > >60.0 >60.0 >60.0   < > = values in this interval not displayed.  PTINR:  No results for input(s): INR in the last 48 hours.    Significant Procedures:   Dobutamine Stress Test with Color Flow: No results found for this or any previous visit.    2D Echo: No results found for this or any previous visit.     CT scan 6/24: Comminuted fracture of the surgical neck of the left humerus with retraction and mild anteromedial displacement of the major fracture fragment.    Assessment/Plan:      * Syncope    Pt with a syncopal episode - likely related to orthostasis given labs hypovolemic hyponatremia   - Echo w aortic sclerosis w/o stenosis. No WMA. Normal EF.   - monitor on telemetry for arrhythmia - no events so far   - ddx includes seizure given BM; will check spot EEG which is pending         Hyponatremia    Likely hypovolemic hyponatremia 2/2 to diuretics - hctz. Also increased urination with etoh.   On admit. 133 today   - IVF held yesterday after NA shot up to 128  - Goal for correction is  4-6 meq/24hrs; no more than 8-10meq in 24 hrs   - Will given some D5W to prevent overcorrection. Pt slightly above goal. Monitor Na  q6 hrs             Closed fracture of proximal end of left humerus    Orthopedics following.   - pain control with perocet 5-325   -CT scan shows a c omminuted fracture of the surgical neck of the left humerus with retraction and mild anteromedial displacement of the major fracture fragment.  - Ortho to perform ORIF 6/27   - RCRI score of 0-1 which corresponds to up to a 1% chance of perioperative cardiac events. No further testing needed prior to surgery.         Depression    Stable   Cont home citalopram           VTE Risk Mitigation         Ordered     Place KALIN hose  Until discontinued      06/25/18 1538     Place sequential compression device  Until discontinued      06/25/18 1538     IP VTE HIGH RISK PATIENT  Once      06/24/18 0504     Place sequential compression device  Until discontinued      06/24/18 0504              Mounika Torres MD  Department of Hospital Medicine   [facility]

## 2018-06-25 NOTE — SUBJECTIVE & OBJECTIVE
Interval History: P feels overall improved today. Complains of left arm pain. No events on tele.     Review of Systems   Constitutional: Positive for fatigue. Negative for activity change, appetite change, chills, fever and unexpected weight change.   HENT: Negative for rhinorrhea and sore throat.    Eyes: Negative for pain and visual disturbance.   Respiratory: Negative for cough and shortness of breath.    Cardiovascular: Negative for chest pain and palpitations.   Gastrointestinal: Negative for abdominal pain, diarrhea and nausea.   Genitourinary: Negative for dysuria, hematuria and urgency.   Musculoskeletal: Negative for gait problem and neck stiffness.   Skin: Negative for rash.   Neurological: Positive for syncope. Negative for light-headedness and headaches.     Objective:     Vital Signs (Most Recent):  Temp: 98 °F (36.7 °C) (06/25/18 1025)  Pulse: 81 (06/25/18 1600)  Resp: 17 (06/25/18 1600)  BP: 125/78 (06/25/18 1600)  SpO2: 97 % (06/25/18 1600) Vital Signs (24h Range):  Temp:  [98 °F (36.7 °C)-98.4 °F (36.9 °C)] 98 °F (36.7 °C)  Pulse:  [] 81  Resp:  [15-22] 17  SpO2:  [90 %-97 %] 97 %  BP: (125-145)/(78-91) 125/78     Weight: 105.7 kg (233 lb 0.4 oz)  Body mass index is 36.5 kg/m².    Intake/Output Summary (Last 24 hours) at 06/25/18 1739  Last data filed at 06/25/18 1400   Gross per 24 hour   Intake              920 ml   Output              500 ml   Net              420 ml      Physical Exam   Constitutional: She is oriented to person, place, and time. She appears well-developed and well-nourished.   Cardiovascular: Normal rate, regular rhythm and normal heart sounds.  Exam reveals no gallop and no friction rub.    No murmur heard.  Pulmonary/Chest: Effort normal and breath sounds normal. No respiratory distress. She has no wheezes. She has no rales.   Abdominal: Soft. Bowel sounds are normal. She exhibits no distension. There is no tenderness.   Musculoskeletal:   Left arm in sling     Neurological: She is alert and oriented to person, place, and time. She displays normal reflexes. No cranial nerve deficit. Coordination normal.   Skin: Skin is warm and dry.       Computed MELD-Na score unavailable. Necessary lab results were not found in the last year.  Computed MELD score unavailable. Necessary lab results were not found in the last year.    Significant Labs:  CBC:    Recent Labs  Lab 06/24/18  0141 06/25/18  0924   WBC 19.27* 9.65   HGB 9.8* 8.7*   HCT 30.7* 28.6*   * 366*     CMP:    Recent Labs  Lab 06/24/18  0141  06/25/18  0023 06/25/18  0721 06/25/18  1204   *  < > 131* 133* 132*   K 3.0*  < > 3.6 3.6 3.8   CL 85*  < > 97 98 97   CO2 21*  < > 25 25 25   *  < > 126* 103 104   BUN 12  < > 15 13 13   CREATININE 0.7  < > 0.7 0.6 0.7   CALCIUM 9.4  < > 9.1 9.2 9.4   PROT 7.9  --   --   --   --    ALBUMIN 4.0  --   --   --   --    BILITOT 0.4  --   --   --   --    ALKPHOS 66  --   --   --   --    AST 24  --   --   --   --    ALT 17  --   --   --   --    ANIONGAP 15  < > 9 10 10   EGFRNONAA >60.0  < > >60.0 >60.0 >60.0   < > = values in this interval not displayed.  PTINR:  No results for input(s): INR in the last 48 hours.    Significant Procedures:   Dobutamine Stress Test with Color Flow: No results found for this or any previous visit.    2D Echo: No results found for this or any previous visit.     CT scan 6/24: Comminuted fracture of the surgical neck of the left humerus with retraction and mild anteromedial displacement of the major fracture fragment.

## 2018-06-25 NOTE — PLAN OF CARE
Extended Emergency Contact Information  Primary Emergency Contact: Brayan Coleman  Address: [address] [place] [address]             [place] [address] [place]  Home Phone: [phone]  Mobile Phone: [phone]  Relation: Spouse    Ramon Box MD  [address] [facility] / [place] [address]    Future Appointments  Date Time Provider Department Center   7/10/2018 11:00 AM Ramon Box MD [facility]     Payor: /       [id number] - [place], [place] - [address]  [address]  [place] [address]  Phone: [phone] Fax: [phone]    [id number] - [place], [place] - [address]  [address]  [place] [address]  Phone: [phone] Fax: [phone]       06/25/18 1629   Discharge Assessment   Assessment Type Discharge Planning Assessment   Confirmed/corrected address and phone number on facesheet? Yes   Assessment information obtained from? Patient;Medical Record   Expected Length of Stay (days) 2   Communicated expected length of stay with patient/caregiver yes   Prior to hospitilization cognitive status: Alert/Oriented   Prior to hospitalization functional status: Independent   Current cognitive status: Alert/Oriented   Current Functional Status: Independent   Lives With spouse   Able to Return to Prior Arrangements yes   Is patient able to care for self after discharge? Yes   Patient's perception of discharge disposition home or selfcare   Readmission Within The Last 30 Days no previous admission in last 30 days   Patient currently being followed by outpatient case management? No   Patient currently receives any other outside agency services? No   Equipment Currently Used at Home none   Do you have any problems affording any of your prescribed medications? No   Is the patient taking medications as prescribed? yes   Does the patient have transportation home? Yes   Transportation Available family or friend will provide   Does the patient receive services at the [facility]? No   Discharge Plan A  Home with family   Discharge Plan B Home with family;Home Health   Patient/Family In Agreement With Plan yes

## 2018-06-25 NOTE — PROGRESS NOTES
"[facility]  Orthopedics  Progress Note    Patient Name: Shannon Coleman  MRN: [id number]  Admission Date: 6/24/2018  Hospital Length of Stay: 1 days  Attending Provider: Mounika Torres MD  Primary Care Provider: Ramon Box MD  Follow-up For: Procedure(s) (LRB):  ORIF, HUMERUS, PROXIMAL- synthes- left- c arm (Left)    Post-Operative Day:    Subjective:     Principal Problem:Syncope    Principal Orthopedic Problem: left proximal humerus fracture    Interval History: Patient seen and examined at bedside.  No acute events overnight.  Pain controlled.  Hyponatremia improving. Plan for echo today.    Review of patient's allergies indicates:  No Known Allergies    Current Facility-Administered Medications   Medication    citalopram tablet 40 mg    enoxaparin injection 40 mg    fenofibrate tablet 160 mg    metoprolol succinate (TOPROL-XL) 24 hr tablet 25 mg    oxyCODONE-acetaminophen 5-325 mg per tablet 1 tablet     Objective:     Vital Signs (Most Recent):  Temp: 98 °F (36.7 °C) (06/25/18 1025)  Pulse: 87 (06/25/18 1400)  Resp: 19 (06/25/18 1237)  BP: 133/84 (06/25/18 1237)  SpO2: 96 % (06/25/18 1237) Vital Signs (24h Range):  Temp:  [98 °F (36.7 °C)-98.6 °F (37 °C)] 98 °F (36.7 °C)  Pulse:  [] 87  Resp:  [15-22] 19  SpO2:  [90 %-96 %] 96 %  BP: (127-173)/(80-91) 133/84     Weight: 105.7 kg (233 lb 0.4 oz)  Height: 5' 7" (170.2 cm)  Body mass index is 36.5 kg/m².      Intake/Output Summary (Last 24 hours) at 06/25/18 1538  Last data filed at 06/25/18 1400   Gross per 24 hour   Intake              920 ml   Output              500 ml   Net              420 ml       Ortho/SPM Exam  AAOx4  NAD  RRR  No increased WOB    LUE:  Sling in plain  AIN, PIN, M, R, U intact    Significant Labs:   CBC:   Recent Labs  Lab 06/24/18  0141 06/25/18  0924   WBC 19.27* 9.65   HGB 9.8* 8.7*   HCT 30.7* 28.6*   * 366*     CMP:   Recent Labs  Lab 06/24/18  0141  06/25/18  0023 06/25/18  0721 " 06/25/18  1204   *  < > 131* 133* 132*   K 3.0*  < > 3.6 3.6 3.8   CL 85*  < > 97 98 97   CO2 21*  < > 25 25 25   *  < > 126* 103 104   BUN 12  < > 15 13 13   CREATININE 0.7  < > 0.7 0.6 0.7   CALCIUM 9.4  < > 9.1 9.2 9.4   PROT 7.9  --   --   --   --    ALBUMIN 4.0  --   --   --   --    BILITOT 0.4  --   --   --   --    ALKPHOS 66  --   --   --   --    AST 24  --   --   --   --    ALT 17  --   --   --   --    ANIONGAP 15  < > 9 10 10   EGFRNONAA >60.0  < > >60.0 >60.0 >60.0   < > = values in this interval not displayed.  All pertinent labs within the past 24 hours have been reviewed.    Significant Imaging: None    Assessment/Plan:     Closed fracture of proximal end of left humerus    Shannon Coleman is a 57 y.o. female with a left proximal humerus fracture    - Plan for ORIF on Wednesday  - Plan for echo today by primary  - Discussed case with primary. Perioperative risk assessment to be performed by primary.  - Primary suspects hyponatremia from combination of alcohol intake and HCTZ  - DVT ppx: lovenox today, hold tomorrow  - NPO tomorrow night  - Appreciate assistance by medicine              Christian Henderson MD  Orthopedics  [facility]

## 2018-06-25 NOTE — ASSESSMENT & PLAN NOTE
Orthopedics following.   - pain control with perocet 5-325   -CT scan shows a c omminuted fracture of the surgical neck of the left humerus with retraction and mild anteromedial displacement of the major fracture fragment.  - Ortho to perform ORIF 6/27   - RCRI score of 0-1 which corresponds to up to a 1% chance of perioperative cardiac events. No further testing needed prior to surgery.

## 2018-06-25 NOTE — SUBJECTIVE & OBJECTIVE
"Principal Problem:Syncope    Principal Orthopedic Problem: left proximal humerus fracture    Interval History: Patient seen and examined at bedside.  No acute events overnight.  Pain controlled.  Hyponatremia improving. Plan for echo today.    Review of patient's allergies indicates:  No Known Allergies    Current Facility-Administered Medications   Medication    citalopram tablet 40 mg    enoxaparin injection 40 mg    fenofibrate tablet 160 mg    metoprolol succinate (TOPROL-XL) 24 hr tablet 25 mg    oxyCODONE-acetaminophen 5-325 mg per tablet 1 tablet     Objective:     Vital Signs (Most Recent):  Temp: 98 °F (36.7 °C) (06/25/18 1025)  Pulse: 87 (06/25/18 1400)  Resp: 19 (06/25/18 1237)  BP: 133/84 (06/25/18 1237)  SpO2: 96 % (06/25/18 1237) Vital Signs (24h Range):  Temp:  [98 °F (36.7 °C)-98.6 °F (37 °C)] 98 °F (36.7 °C)  Pulse:  [] 87  Resp:  [15-22] 19  SpO2:  [90 %-96 %] 96 %  BP: (127-173)/(80-91) 133/84     Weight: 105.7 kg (233 lb 0.4 oz)  Height: 5' 7" (170.2 cm)  Body mass index is 36.5 kg/m².      Intake/Output Summary (Last 24 hours) at 06/25/18 1538  Last data filed at 06/25/18 1400   Gross per 24 hour   Intake              920 ml   Output              500 ml   Net              420 ml       Ortho/SPM Exam  AAOx4  NAD  RRR  No increased WOB    LUE:  Sling in plain  AIN, PIN, M, R, U intact    Significant Labs:   CBC:   Recent Labs  Lab 06/24/18  0141 06/25/18  0924   WBC 19.27* 9.65   HGB 9.8* 8.7*   HCT 30.7* 28.6*   * 366*     CMP:   Recent Labs  Lab 06/24/18  0141  06/25/18  0023 06/25/18  0721 06/25/18  1204   *  < > 131* 133* 132*   K 3.0*  < > 3.6 3.6 3.8   CL 85*  < > 97 98 97   CO2 21*  < > 25 25 25   *  < > 126* 103 104   BUN 12  < > 15 13 13   CREATININE 0.7  < > 0.7 0.6 0.7   CALCIUM 9.4  < > 9.1 9.2 9.4   PROT 7.9  --   --   --   --    ALBUMIN 4.0  --   --   --   --    BILITOT 0.4  --   --   --   --    ALKPHOS 66  --   --   --   --    AST 24  --   --   --   -- "    ALT 17  --   --   --   --    ANIONGAP 15  < > 9 10 10   EGFRNONAA >60.0  < > >60.0 >60.0 >60.0   < > = values in this interval not displayed.  All pertinent labs within the past 24 hours have been reviewed.    Significant Imaging: None

## 2018-06-25 NOTE — PLAN OF CARE
Problem: Patient Care Overview  Goal: Plan of Care Review  Outcome: Ongoing (interventions implemented as appropriate)  Plan of care reviewed with pt. Pt remained free of falls, trauma, and injury. VSS. 2 250 cc d5w bolus' given. ECHO done today awaiting results. Sx to shoulder scheduled for 6/27. Will continue to monitor.

## 2018-06-25 NOTE — ASSESSMENT & PLAN NOTE
Pt with a syncopal episode - likely related to orthostasis given labs hypovolemic hyponatremia   - Echo w aortic sclerosis w/o stenosis. No WMA. Normal EF.   - monitor on telemetry for arrhythmia - no events so far   - ddx includes seizure given BM; will check spot EEG which is pending

## 2018-06-25 NOTE — ASSESSMENT & PLAN NOTE
Shannon Coleman is a 57 y.o. female with a left proximal humerus fracture    - Plan for ORIF on Wednesday  - Plan for echo today by primary  - Discussed case with primary. Perioperative risk assessment to be performed by primary.  - Primary suspects hyponatremia from combination of alcohol intake and HCTZ  - DVT ppx: lovenox today, hold tomorrow  - NPO tomorrow night  - Appreciate assistance by medicine

## 2018-06-25 NOTE — ASSESSMENT & PLAN NOTE
Likely hypovolemic hyponatremia 2/2 to diuretics - hctz. Also increased urination with etoh.   On admit. 133 today   - IVF held yesterday after NA shot up to 128  - Goal for correction is  4-6 meq/24hrs; no more than 8-10meq in 24 hrs   - Will given some D5W to prevent overcorrection. Pt slightly above goal. Monitor Na q6 hrs

## 2018-06-26 ENCOUNTER — ANESTHESIA EVENT (OUTPATIENT)
Dept: SURGERY | Facility: HOSPITAL | Age: 58
DRG: 982 | End: 2018-06-26

## 2018-06-26 PROBLEM — E87.1 HYPONATREMIA: Status: ACTIVE | Noted: 2018-06-26

## 2018-06-26 PROBLEM — R55 SYNCOPE: Status: ACTIVE | Noted: 2018-06-26

## 2018-06-26 LAB
ANION GAP SERPL CALC-SCNC: 10 MMOL/L
ANION GAP SERPL CALC-SCNC: 13 MMOL/L
ANION GAP SERPL CALC-SCNC: 9 MMOL/L
ANION GAP SERPL CALC-SCNC: 9 MMOL/L
BASOPHILS # BLD AUTO: 0.05 K/UL
BASOPHILS NFR BLD: 0.5 %
BUN SERPL-MCNC: 12 MG/DL
BUN SERPL-MCNC: 13 MG/DL
BUN SERPL-MCNC: 14 MG/DL
BUN SERPL-MCNC: 14 MG/DL
CALCIUM SERPL-MCNC: 9.1 MG/DL
CALCIUM SERPL-MCNC: 9.3 MG/DL
CHLORIDE SERPL-SCNC: 100 MMOL/L
CHLORIDE SERPL-SCNC: 98 MMOL/L
CHLORIDE SERPL-SCNC: 99 MMOL/L
CHLORIDE SERPL-SCNC: 99 MMOL/L
CO2 SERPL-SCNC: 22 MMOL/L
CO2 SERPL-SCNC: 24 MMOL/L
CO2 SERPL-SCNC: 26 MMOL/L
CO2 SERPL-SCNC: 26 MMOL/L
CREAT SERPL-MCNC: 0.6 MG/DL
CREAT SERPL-MCNC: 0.8 MG/DL
DIFFERENTIAL METHOD: ABNORMAL
EOSINOPHIL # BLD AUTO: 0.2 K/UL
EOSINOPHIL NFR BLD: 1.5 %
ERYTHROCYTE [DISTWIDTH] IN BLOOD BY AUTOMATED COUNT: 20.3 %
EST. GFR  (AFRICAN AMERICAN): >60 ML/MIN/1.73 M^2
EST. GFR  (NON AFRICAN AMERICAN): >60 ML/MIN/1.73 M^2
GLUCOSE SERPL-MCNC: 110 MG/DL
GLUCOSE SERPL-MCNC: 119 MG/DL
GLUCOSE SERPL-MCNC: 192 MG/DL
GLUCOSE SERPL-MCNC: 84 MG/DL
HCT VFR BLD AUTO: 28.2 %
HGB BLD-MCNC: 8.8 G/DL
IMM GRANULOCYTES # BLD AUTO: 0.06 K/UL
IMM GRANULOCYTES NFR BLD AUTO: 0.6 %
LYMPHOCYTES # BLD AUTO: 1.4 K/UL
LYMPHOCYTES NFR BLD: 14.7 %
MCH RBC QN AUTO: 22.9 PG
MCHC RBC AUTO-ENTMCNC: 31.2 G/DL
MCV RBC AUTO: 73 FL
MONOCYTES # BLD AUTO: 1.2 K/UL
MONOCYTES NFR BLD: 11.8 %
NEUTROPHILS # BLD AUTO: 6.9 K/UL
NEUTROPHILS NFR BLD: 70.9 %
NRBC BLD-RTO: 0 /100 WBC
PLATELET # BLD AUTO: 345 K/UL
PMV BLD AUTO: 9.4 FL
POTASSIUM SERPL-SCNC: 3.6 MMOL/L
POTASSIUM SERPL-SCNC: 3.6 MMOL/L
POTASSIUM SERPL-SCNC: 3.9 MMOL/L
POTASSIUM SERPL-SCNC: 4 MMOL/L
RBC # BLD AUTO: 3.84 M/UL
SODIUM SERPL-SCNC: 133 MMOL/L
SODIUM SERPL-SCNC: 134 MMOL/L
WBC # BLD AUTO: 9.75 K/UL

## 2018-06-26 PROCEDURE — 97161 PT EVAL LOW COMPLEX 20 MIN: CPT

## 2018-06-26 PROCEDURE — 85025 COMPLETE CBC W/AUTO DIFF WBC: CPT

## 2018-06-26 PROCEDURE — 25000003 PHARM REV CODE 250: Performed by: HOSPITALIST

## 2018-06-26 PROCEDURE — 80048 BASIC METABOLIC PNL TOTAL CA: CPT

## 2018-06-26 PROCEDURE — 97165 OT EVAL LOW COMPLEX 30 MIN: CPT

## 2018-06-26 PROCEDURE — 80048 BASIC METABOLIC PNL TOTAL CA: CPT | Mod: 91

## 2018-06-26 PROCEDURE — 95816 EEG AWAKE AND DROWSY: CPT

## 2018-06-26 PROCEDURE — 63700000 PHARM REV CODE 250 ALT 637 W/O HCPCS: Performed by: HOSPITALIST

## 2018-06-26 PROCEDURE — 20600001 HC STEP DOWN PRIVATE ROOM

## 2018-06-26 PROCEDURE — 36415 COLL VENOUS BLD VENIPUNCTURE: CPT

## 2018-06-26 PROCEDURE — 95819 EEG AWAKE AND ASLEEP: CPT | Mod: 26,,, | Performed by: PSYCHIATRY & NEUROLOGY

## 2018-06-26 PROCEDURE — 99232 SBSQ HOSP IP/OBS MODERATE 35: CPT | Mod: ,,, | Performed by: HOSPITALIST

## 2018-06-26 RX ORDER — AMLODIPINE BESYLATE 10 MG/1
10 TABLET ORAL DAILY
Status: DISCONTINUED | OUTPATIENT
Start: 2018-06-27 | End: 2018-06-26

## 2018-06-26 RX ORDER — AMLODIPINE BESYLATE 10 MG/1
10 TABLET ORAL DAILY
Status: DISCONTINUED | OUTPATIENT
Start: 2018-06-26 | End: 2018-06-30 | Stop reason: HOSPADM

## 2018-06-26 RX ORDER — HYDRALAZINE HYDROCHLORIDE 25 MG/1
25 TABLET, FILM COATED ORAL EVERY 8 HOURS
Status: DISCONTINUED | OUTPATIENT
Start: 2018-06-26 | End: 2018-06-30 | Stop reason: HOSPADM

## 2018-06-26 RX ADMIN — OXYCODONE HYDROCHLORIDE AND ACETAMINOPHEN 1 TABLET: 5; 325 TABLET ORAL at 08:06

## 2018-06-26 RX ADMIN — OXYCODONE HYDROCHLORIDE AND ACETAMINOPHEN 1 TABLET: 5; 325 TABLET ORAL at 05:06

## 2018-06-26 RX ADMIN — METOPROLOL SUCCINATE 25 MG: 25 TABLET, EXTENDED RELEASE ORAL at 09:06

## 2018-06-26 RX ADMIN — AMLODIPINE BESYLATE 10 MG: 10 TABLET ORAL at 06:06

## 2018-06-26 RX ADMIN — OXYCODONE HYDROCHLORIDE AND ACETAMINOPHEN 1 TABLET: 5; 325 TABLET ORAL at 02:06

## 2018-06-26 RX ADMIN — CITALOPRAM HYDROBROMIDE 40 MG: 40 TABLET ORAL at 09:06

## 2018-06-26 RX ADMIN — FENOFIBRATE 160 MG: 160 TABLET ORAL at 09:06

## 2018-06-26 RX ADMIN — HYDRALAZINE HYDROCHLORIDE 25 MG: 25 TABLET, FILM COATED ORAL at 08:06

## 2018-06-26 NOTE — PT/OT/SLP EVAL
Occupational Therapy   Evaluation and Discharge Note    Name: Shannon Coleman  MRN: [id number]  Admitting Diagnosis:  Syncope with fall resulting in L UE proximal humerus fx; ORIF scheduled for tomorrow 6/27    Recommendations:     Discharge Recommendations: home  Discharge Equipment Recommendations:  none  Barriers to discharge:  None    History:     Occupational Profile:  Living Environment: Pt lives with her  in 1-story house with 1 ANNELISE and tub/shower combo.  Previous level of function: (I) with ADLs and mobility, driving, not working  Equipment Owned:  none  Assistance upon Discharge:  works during the day; granddaughter and other family able to assist as needed    Past Medical History:   Diagnosis Date    Allergy     Anxiety     Arthritis     Depression     GERD (gastroesophageal reflux disease)     Hyperlipidemia     Hypertension     Tobacco abuse     Vitamin D deficiency disease        Past Surgical History:   Procedure Laterality Date    child birth         Subjective     Chief Complaint: L UE pain  Patient/Family stated goals: Return to PLOF  Communicated with: RN prior to session.  Pain/Comfort:  · Pain Rating 1: 8/10  · Location - Side 1: Left  · Location - Orientation 1: upper  · Location 1: arm  · Pain Addressed 1: Pre-medicate for activity  · Pain Rating Post-Intervention 1: 8/10    Patients cultural, spiritual, Anabaptism conflicts given the current situation: None    Objective:     Patient found with: telemetry, pulse ox (continuous)    General Precautions: Standard, fall   Orthopedic Precautions:LUE non weight bearing   Braces: UE Sling     Occupational Performance:    Bed Mobility:    · Patient completed Supine to Sit with independence    Functional Mobility/Transfers:  · Patient completed Sit <> Stand Transfer with independence from EOB with no assistive device   · Functional Mobility: ~200 ft in hallway with supervision no AD; no c/o dizziness/feeling lightheaded    Activities  "of Daily Living:  · LB Dressing: Min A to don socks due to L UE immobilized in sling      Cognitive/Visual Perceptual:  WFL    Physical Exam:  Postural examination/scapula alignment:    -       No postural abnormalities identified  Dominant hand:    -       Right  Upper Extremity Range of Motion:     -       Right Upper Extremity: WFL  -       Left Upper Extremity: NT due to immobilized in sling  Upper Extremity Strength: R WFL   Strength: WFL    Patient left HOB elevated with all lines intact and call button in reach    [facility] 6 Click:  [facility] Total Score: 20    Treatment & Education:  OT/PT eval; educated on OT role and POC including no further need for acute OT (pending surgery); white board updated  Education:    Assessment:     Shannon Coleman is a 57 y.o. female with a medical diagnosis of Syncope. At this time, patient is functioning at their prior level of function and does not require further acute OT services; limited by L UE immobilization and NWB status; ORIF planned for tomorrow .     Clinical Decision Makin.  OT Low:  "Pt evaluation falls under low complexity for evaluation coding due to performance deficits noted in 1-3 areas as stated above and 0 co-morbities affecting current functional status. Data obtained from problem focused assessments. No modifications or assistance was required for completion of evaluation. Only brief occupational profile and history review completed."     Plan:     During this hospitalization, patient does not require further acute OT services.  Please re-consult if situation changes.    · Plan of Care Reviewed with: patient    This Plan of care has been discussed with the patient who was involved in its development and understands and is in agreement with the identified goals and treatment plan    GOALS:    Occupational Therapy Goals     Not on file          Multidisciplinary Problems (Resolved)        Problem: Occupational Therapy Goal    Goal Priority " Disciplines Outcome Interventions   Occupational Therapy Goal   (Resolved)     OT, PT/OT Outcome(s) achieved                    Time Tracking:     OT Date of Treatment: 06/26/18  OT Start Time: 0952  OT Stop Time: 1003  OT Total Time (min): 11 min    Billable Minutes:Evaluation 11 minutes    SHERRON Kingsley  6/26/2018

## 2018-06-26 NOTE — PROGRESS NOTES
"[facility]  Orthopedics  Progress Note    Patient Name: Shannon Coleman  MRN: [id number]  Admission Date: 6/24/2018  Hospital Length of Stay: 2 days  Attending Provider: Clif Page MD  Primary Care Provider: Ramon Box MD  Follow-up For: Procedure(s) (LRB):  ORIF, HUMERUS, PROXIMAL- synthes- left- c arm (Left)    Post-Operative Day:    Subjective:     Principal Problem:Syncope    Principal Orthopedic Problem: left proximal humerus fracture    Interval History: Patient seen and examined at bedside.  No acute events overnight.  Pain controlled.  Sodium 133 this morning. Echo yesterday showed normal EF    Review of patient's allergies indicates:  No Known Allergies    Current Facility-Administered Medications   Medication    citalopram tablet 40 mg    fenofibrate tablet 160 mg    metoprolol succinate (TOPROL-XL) 24 hr tablet 25 mg    oxyCODONE-acetaminophen 5-325 mg per tablet 1 tablet     Objective:     Vital Signs (Most Recent):  Temp: 98.7 °F (37.1 °C) (06/25/18 1945)  Pulse: 84 (06/26/18 0345)  Resp: 16 (06/26/18 0345)  BP: (!) 167/91 (06/26/18 0345)  SpO2: (!) 93 % (06/26/18 0345) Vital Signs (24h Range):  Temp:  [98 °F (36.7 °C)-98.7 °F (37.1 °C)] 98.7 °F (37.1 °C)  Pulse:  [71-93] 84  Resp:  [15-20] 16  SpO2:  [92 %-97 %] 93 %  BP: (125-169)/(78-92) 167/91     Weight: 105.7 kg (233 lb 0.4 oz)  Height: 5' 7" (170.2 cm)  Body mass index is 36.5 kg/m².      Intake/Output Summary (Last 24 hours) at 06/26/18 0824  Last data filed at 06/26/18 0600   Gross per 24 hour   Intake             1360 ml   Output              500 ml   Net              860 ml       Ortho/SPM Exam    AAOx4  NAD  RRR  No increased WOB    LUE:  Sling in plain  AIN, PIN, M, R, U intact    Significant Labs:   CBC:     Recent Labs  Lab 06/25/18 0924 06/26/18  0739   WBC 9.65 9.75   HGB 8.7* 8.8*   HCT 28.6* 28.2*   * 345     CMP:     Recent Labs  Lab 06/25/18  1808 06/26/18  0003 06/26/18  0739   * 134* " 133*   K 3.6 3.6 3.6   CL 97 99 98   CO2 25 26 26   GLU 94 110 119*   BUN 14 14 13   CREATININE 0.6 0.6 0.6   CALCIUM 9.2 9.1 9.3   ANIONGAP 10 9 9   EGFRNONAA >60.0 >60.0 >60.0     All pertinent labs within the past 24 hours have been reviewed.    Significant Imaging: None    Assessment/Plan:     Closed fracture of proximal end of left humerus    Shannon Coleman is a 57 y.o. female with a left proximal humerus fracture    - Plan for ORIF on tomorrow  - Discussed case with primary. Low risk for perioperative cardiac issues  - Primary suspects hyponatremia from combination of alcohol intake and HCTZ  - DVT ppx: hold today  - NPO midnightt  - Appreciate assistance by medicine              Christian Henderson MD  Orthopedics  [facility] Note:  I agree with the above assessment and plan.    Quinn Parker MD

## 2018-06-26 NOTE — PROCEDURES
DATE OF SERVICE:  06/26/2018    EEG NUMBER:  FH-.    REFERRING PHYSICIAN:  Dr. Torres.    This EEG was performed to assess for evidence of underlying epilepsy.    ELECTROENCEPHALOGRAM REPORT     METHODOLOGY:  Electroencephalographic (EEG) recording is recorded with   electrodes placed according to the International 10-20 placement system.  Thirty   two (32) channels of digital signal (sampling rate of 512/sec), including T1   and T2, were simultaneously recorded from the scalp and may include EKG, EMG,   and/or eye monitors.  Recording band pass was 0.1 to 512 Hz.  Digital video   recording of the patient is simultaneously recorded with the EEG.  The patient   is instructed to report clinical symptoms which may occur during the recording   session.  EEG and video recording are stored and archived in digital format.    Activation procedures, which include photic stimulation, hyperventilation and   instructing patients to perform simple tasks, are done in selected patients.    The EEG is displayed on a monitor screen and can be reviewed using different   montages.  Computer assisted-analysis is employed to detect spike and   electrographic seizure activity.   The entire record is submitted for computer   analysis.  The entire recording is visually reviewed, and the times identified   by computer analysis as being spikes or seizures are reviewed again.    Compressed spectral analysis (CSA) is also performed on the activity recorded   from each individual channel.  This is displayed as a power display of   frequencies from 0 to 30 Hz over time.   The CSA is reviewed looking for   asymmetries in power between homologous areas of the scalp, then compared with   the original EEG recording.    Gini software was also utilized in the review of this study.  This software   suite analyzes the EEG recording in multiple domains.  Coherence and rhythmicity   are computed to identify EEG sections which may contain  organized seizures.    Each channel undergoes analysis to detect the presence of spike and sharp waves   which have special and morphological characteristics of epileptic activity.  The   routine EEG recording is converted from special into frequency domain.  This is   then displayed comparing homologous areas to identify areas of significant   asymmetry.  Algorithm to identify non-cortically generated artifact is used to   separate artifact from the EEG.      EEG FINDINGS:  The recording was obtained with a number of standard bipolar and   referential montages during wakefulness, drowsiness and sleep.  In the alert   state, the posterior background rhythm was a symmetric, well-modulated 9 Hz   alpha rhythm, which reacted symmetrically to eye opening.  Activation procedures   not performed.  During drowsiness, the background rhythm waxed and waned and   there were periods of slowing.  During stage II sleep, symmetric V waves and   sleep spindles were noted.  There were no focal abnormalities.  There were no   interictal epileptiform abnormalities and no clinical or electrographic seizures   recorded.  The EKG channel revealed sinus rhythm.    IMPRESSION:  This is a normal EEG during wakefulness, drowsiness and sleep.    CLINICAL CORRELATION:  The patient is a 57-year-old female who presented after a   syncopal episode.  The patient is currently not maintained on any anti-seizure   medications.  This is a normal EEG during wakefulness, drowsiness and sleep.    There is no evidence for either cortical dysfunction nor an epileptic process on   this recording.  No seizures were recorded during this study.  The EKG channel   revealed sinus rhythm.      FAK/HN  dd: 06/26/2018 13:32:07 (CDT)  td: 06/26/2018 13:47:04 (CDT)  Doc ID   #[id number]  Job ID #[id number]    CC:

## 2018-06-26 NOTE — PLAN OF CARE
Problem: Occupational Therapy Goal  Goal: Occupational Therapy Goal  Outcome: Outcome(s) achieved Date Met: 06/26/18  Srini and D/C OT 6/26/18

## 2018-06-26 NOTE — PLAN OF CARE
"Per dr dunaway in [facility] and per md notes: "Plan for ORIF on tomorrow"       06/26/18 3840   Right Care Assessment   Can the patient answer the patient profile reliably? Yes, cognitively intact   How often would a person be available to care for the patient? Whenever needed   Describe the patient's ability to walk at the present time. Minor restrictions or changes   How does the patient rate their overall health at the present time? Fair   Number of comorbid conditions (as recorded on the chart) Five or more   During the past month, has the patient often been bothered by feeling down, depressed or hopeless? No   Have you felt down, depressed, or hopeless? 0   During the past month, has the patient often been bothered by little interest or pleasure in doing things? No   Have you felt little interest or pleasure in doing things? 0     "

## 2018-06-26 NOTE — ANESTHESIA PREPROCEDURE EVALUATION
[facility]  Anesthesia Pre-Operative Evaluation         Patient Name: Shannon Coleman  YOB: 1960  MRN: [id number]    SUBJECTIVE:     Pre-operative evaluation for Procedure(s) (LRB):  ORIF, HUMERUS, PROXIMAL- synthes- left- c arm (Left)     06/26/2018    Shannon Coleman is a 57 y.o. female w/ a significant PMHx of HTN, T2DM, GERD, active smoker presented after syncopal episode. Found to have left proximal humerus fracture. During workup for syncope, found to be hyponatremic which has improved with IVF.    Patient now presents for the above procedure(s).      LDA:   - 20 G PIV Rt AC     Prev airway: None documented.    Drips: None documented.      Patient Active Problem List   Diagnosis    Right facial swelling    HTN (hypertension), benign    Hyperlipidemia    Anxiety    Lipoma of face    Insomnia    Tobacco use disorder    Swelling, mass, or lump on face    Depression    Closed fracture of proximal end of left humerus    Syncope    Hyponatremia       Review of patient's allergies indicates:  No Known Allergies    Current Inpatient Medications:   citalopram  40 mg Oral Daily    fenofibrate  160 mg Oral Daily    metoprolol succinate  25 mg Oral Daily       No current facility-administered medications on file prior to encounter.      Current Outpatient Prescriptions on File Prior to Encounter   Medication Sig Dispense Refill    amlodipine (NORVASC) 10 MG tablet TAKE 1 TABLET (10 MG TOTAL) BY MOUTH ONCE DAILY. 30 tablet 1    amlodipine (NORVASC) 10 MG tablet TAKE 1 TABLET (10 MG TOTAL) BY MOUTH ONCE DAILY. 30 tablet 0    amLODIPine (NORVASC) 10 MG tablet Take 1 tablet (10 mg total) by mouth once daily. 30 tablet 3    amLODIPine (NORVASC) 10 MG tablet TAKE 1 TABLET (10 MG TOTAL) BY MOUTH ONCE DAILY. 30 tablet 3    atorvastatin (LIPITOR) 20 MG tablet TAKE 1 TABLET (20 MG TOTAL) BY MOUTH ONCE DAILY. 90 tablet 0    atorvastatin (LIPITOR) 20 MG tablet TAKE 1 TABLET (20 MG  TOTAL) BY MOUTH ONCE DAILY. 90 tablet 1    cetirizine (ZYRTEC) 10 MG tablet Take 1 tablet (10 mg total) by mouth every evening. 30 tablet 1    citalopram (CELEXA) 40 MG tablet Take 1 tablet (40 mg total) by mouth once daily. 30 tablet 2    clonazePAM (KLONOPIN) 1 MG tablet Take 1 tablet (1 mg total) by mouth every evening. 15 tablet 0    diclofenac sodium (VOLTAREN) 1 % Gel Apply 2 g topically 4 (four) times daily. 100 g 0    ergocalciferol (ERGOCALCIFEROL) 50,000 unit Cap Take 1 capsule (50,000 Units total) by mouth every 7 days. 12 capsule 3    fenofibrate 160 MG Tab Take 1 tablet (160 mg total) by mouth once daily. 30 tablet 3    fenofibrate 160 MG Tab TAKE 1 TABLET BY MOUTH EVERY DAY 30 tablet 3    fenofibrate 160 MG Tab TAKE 1 TABLET (160 MG TOTAL) BY MOUTH ONCE DAILY. 30 tablet 3    fenofibrate 160 MG Tab TAKE ONE(1) TABLET BY MOUTH EVERY DAY 30 tablet 2    fenofibrate 160 MG Tab TAKE ONE(1) TABLET BY MOUTH EVERY DAY 30 tablet 3    fenofibrate 160 MG Tab TAKE ONE(1) TABLET BY MOUTH EVERY DAY 30 tablet 3    hydrocortisone 2.5 % ointment Apply topically 2 (two) times daily. To affected area 28 g 1    hydrocortisone 2.5 % ointment Apply topically 2 (two) times daily. To affected area 20 g 0    hydrocortisone 2.5 % ointment Apply topically 2 (two) times daily. To affected area 20 g 0    losartan-hydrochlorothiazide 100-25 mg (HYZAAR) 100-25 mg per tablet Take 1 tablet by mouth every morning. 30 tablet 3    losartan-hydrochlorothiazide 100-25 mg (HYZAAR) 100-25 mg per tablet Take 1 tablet by mouth every morning. 30 tablet 3    losartan-hydrochlorothiazide 100-25 mg (HYZAAR) 100-25 mg per tablet Take 1 tablet by mouth every morning. 30 tablet 3    losartan-hydrochlorothiazide 100-25 mg (HYZAAR) 100-25 mg per tablet Take 1 tablet by mouth every morning. 30 tablet 1    losartan-hydrochlorothiazide 100-25 mg (HYZAAR) 100-25 mg per tablet TAKE ONE(1) TABLET BY MOUTH EVERY MORNING 30 tablet 0     losartan-hydrochlorothiazide 100-25 mg (HYZAAR) 100-25 mg per tablet Take 1 tablet by mouth every morning. 30 tablet 3    losartan-hydrochlorothiazide 100-25 mg (HYZAAR) 100-25 mg per tablet TAKE 1 TABLET BY MOUTH EVERY MORNING. 30 tablet 3    meclizine (ANTIVERT) 25 mg tablet Take 1 tablet (25 mg total) by mouth 3 (three) times daily as needed for Nausea. 7 tablet 0    meloxicam (MOBIC) 7.5 MG tablet Take 1 tablet (7.5 mg total) by mouth once daily. 30 tablet 3    metformin (GLUCOPHAGE) 500 MG tablet Take 1 tablet (500 mg total) by mouth daily with breakfast. 30 tablet 2    metFORMIN (GLUCOPHAGE) 500 MG tablet TAKE 1 TABLET (500 MG TOTAL) BY MOUTH DAILY WITH BREAKFAST. 30 tablet 2    metFORMIN (GLUCOPHAGE) 500 MG tablet TAKE 1 TABLET (500 MG TOTAL) BY MOUTH DAILY WITH BREAKFAST. 30 tablet 2    metoprolol succinate (TOPROL-XL) 25 MG 24 hr tablet TAKE 1 TABLET (25 MG TOTAL) BY MOUTH ONCE DAILY. 30 tablet 1    metoprolol succinate (TOPROL-XL) 25 MG 24 hr tablet TAKE 1 TABLET BY MOUTH EVERY DAY 30 tablet 0    metoprolol succinate (TOPROL-XL) 25 MG 24 hr tablet Take 1 tablet (25 mg total) by mouth once daily. 30 tablet 3    metoprolol succinate (TOPROL-XL) 25 MG 24 hr tablet Take 1 tablet (25 mg total) by mouth once daily. 30 tablet 3    metoprolol succinate (TOPROL-XL) 25 MG 24 hr tablet TAKE 1 TABLET (25 MG TOTAL) BY MOUTH ONCE DAILY. 30 tablet 3    MULTIVITS,CA,MINERALS/IRON/FA (ONE-A-DAY WOMENS FORMULA ORAL) Take 1 tablet by mouth once daily.      omega-3 fatty acids-vitamin E (FISH OIL) 1,000 mg Cap Take 2 capsules by mouth once daily. 30 each 4    omeprazole (PRILOSEC) 20 MG capsule TAKE ONE CAPSULE BY MOUTH EVERY DAY 30 capsule 3    paroxetine (PAXIL) 40 MG tablet TAKE 1 TABLET (40 MG TOTAL) BY MOUTH EVERY MORNING. 30 tablet 3    rosuvastatin (CRESTOR) 5 MG tablet Take 1 tablet (5 mg total) by mouth once daily. 90 tablet 1    tramadol (ULTRAM) 50 mg tablet Take 1 tablet (50 mg total) by mouth  every 12 (twelve) hours as needed for Pain. 30 tablet 0       Past Surgical History:   Procedure Laterality Date    child birth         Social History     Social History    Marital status:      Spouse name: N/A    Number of children: N/A    Years of education: N/A     Occupational History    Not on file.     Social History Main Topics    Smoking status: Current Every Day Smoker     Packs/day: 1.50    Smokeless tobacco: Never Used    Alcohol use Yes      Comment: 12 pack every 3 days    Drug use: No    Sexual activity: Yes     Partners: Male     Other Topics Concern    Not on file     Social History Narrative    No narrative on file       OBJECTIVE:     Vital Signs Range (Last 24H):  Temp:  [36.7 °C (98 °F)-37.1 °C (98.7 °F)]   Pulse:  []   Resp:  [15-20]   BP: (125-169)/(78-92)   SpO2:  [92 %-97 %]       CBC:   Recent Labs      06/24/18   0141  06/25/18   0924   WBC  19.27*  9.65   RBC  4.32  3.90*   HGB  9.8*  8.7*   HCT  30.7*  28.6*   PLT  417*  366*   MCV  71*  73*   MCH  22.7*  22.3*   MCHC  31.9*  30.4*       CMP:   Recent Labs      06/24/18   0141   06/25/18   1808  06/26/18   0003   NA  121*   < >  132*  134*   K  3.0*   < >  3.6  3.6   CL  85*   < >  97  99   CO2  21*   < >  25  26   BUN  12   < >  14  14   CREATININE  0.7   < >  0.6  0.6   GLU  157*   < >  94  110   CALCIUM  9.4   < >  9.2  9.1   ALBUMIN  4.0   --    --    --    PROT  7.9   --    --    --    ALKPHOS  66   --    --    --    ALT  17   --    --    --    AST  24   --    --    --    BILITOT  0.4   --    --    --     < > = values in this interval not displayed.       INR:  No results for input(s): PT, INR, PROTIME, APTT in the last 72 hours.    Diagnostic Studies: No relevant studies.    EKG (06/25/18):  Vent. Rate : 082 BPM     Atrial Rate : 082 BPM     P-R Int : 166 ms          QRS Dur : 090 ms      QT Int : 396 ms       P-R-T Axes : 031 007 025 degrees     QTc Int : 462 ms    Normal sinus rhythm  Minimal voltage  criteria for LVH, may be normal variant  Cannot rule out Inferior infarct ,age undetermined  Abnormal ECG  When compared with ECG of 24-JUN-2018 00:24,  Inferior infarct is now Present  ST no longer elevated in Inferior leads  ST no longer depressed in Anterior leads    2D ECHO:  Results for orders placed or performed during the hospital encounter of 06/24/18   2D echo with color flow doppler   Result Value Ref Range    EF 60 55 - 65    Aortic Valve Stenosis TRIVIAL     Est. PA Systolic Pressure 35.04      CONCLUSIONS     1 - Normal left ventricular systolic function (EF 60-65%).     2 - Normal right ventricular systolic function .     3 - Indeterminate LV diastolic function.     4 - Biatrial enlargement.     5 - Aortic sclerosis without significant stenosis.     ASSESSMENT/PLAN:     Anesthesia Evaluation    I have reviewed the Patient Summary Reports.     I have reviewed the Medications.     Review of Systems  Anesthesia Hx:  Neg history of prior surgery. Denies Family Hx of Anesthesia complications.   Denies Personal Hx of Anesthesia complications.   Social:  Smoker    Cardiovascular:   Hypertension    Hepatic/GI:   GERD    Endocrine:   Diabetes        Physical Exam  General:  Well nourished, Obesity    Airway/Jaw/Neck:  Airway Findings: Mouth Opening: Normal Tongue: Normal  General Airway Assessment: Adult  Mallampati: III  Improves to III with phonation.  TM Distance: Normal, at least 6 cm  Jaw/Neck Findings:  Neck ROM: Normal ROM  Neck Findings: Normal     Dental:  Dental Findings: In tact    Chest/Lungs:  Chest/Lungs Findings: Clear to auscultation, Normal Respiratory Rate     Heart/Vascular:  Heart Findings: Rate: Normal  Rhythm: Regular Rhythm  Sounds: Normal     Abdomen:  Abdomen Findings:  Normal, Soft, Nontender     Musculoskeletal:  Musculoskeletal Findings: Normal   Skin:  Skin Findings: Normal    Mental Status:  Mental Status Findings:  Cooperative, Alert and Oriented         Anesthesia Plan  Type of  Anesthesia, risks & benefits discussed:  Anesthesia Type:  general, MAC, regional  Patient's Preference:   Intra-op Monitoring Plan: standard ASA monitors  Intra-op Monitoring Plan Comments:   Post Op Pain Control Plan: per primary service following discharge from PACU, IV/PO Opioids PRN and multimodal analgesia  Post Op Pain Control Plan Comments:   Induction:   IV  Beta Blocker:  Patient is on a Beta-Blocker and has received one dose within the past 24 hours (No further documentation required).       Informed Consent: Patient understands risks and agrees with Anesthesia plan.  Questions answered. Anesthesia consent signed with patient.  ASA Score: 3     Day of Surgery Review of History & Physical:            Ready For Surgery From Anesthesia Perspective.

## 2018-06-26 NOTE — SUBJECTIVE & OBJECTIVE
"Principal Problem:Syncope    Principal Orthopedic Problem: left proximal humerus fracture    Interval History: Patient seen and examined at bedside.  No acute events overnight.  Pain controlled.  Sodium 133 this morning. Echo yesterday showed normal EF    Review of patient's allergies indicates:  No Known Allergies    Current Facility-Administered Medications   Medication    citalopram tablet 40 mg    fenofibrate tablet 160 mg    metoprolol succinate (TOPROL-XL) 24 hr tablet 25 mg    oxyCODONE-acetaminophen 5-325 mg per tablet 1 tablet     Objective:     Vital Signs (Most Recent):  Temp: 98.7 °F (37.1 °C) (06/25/18 1945)  Pulse: 84 (06/26/18 0345)  Resp: 16 (06/26/18 0345)  BP: (!) 167/91 (06/26/18 0345)  SpO2: (!) 93 % (06/26/18 0345) Vital Signs (24h Range):  Temp:  [98 °F (36.7 °C)-98.7 °F (37.1 °C)] 98.7 °F (37.1 °C)  Pulse:  [71-93] 84  Resp:  [15-20] 16  SpO2:  [92 %-97 %] 93 %  BP: (125-169)/(78-92) 167/91     Weight: 105.7 kg (233 lb 0.4 oz)  Height: 5' 7" (170.2 cm)  Body mass index is 36.5 kg/m².      Intake/Output Summary (Last 24 hours) at 06/26/18 0824  Last data filed at 06/26/18 0600   Gross per 24 hour   Intake             1360 ml   Output              500 ml   Net              860 ml       Ortho/SPM Exam    AAOx4  NAD  RRR  No increased WOB    LUE:  Sling in plain  AIN, PIN, M, R, U intact    Significant Labs:   CBC:     Recent Labs  Lab 06/25/18  0924 06/26/18  0739   WBC 9.65 9.75   HGB 8.7* 8.8*   HCT 28.6* 28.2*   * 345     CMP:     Recent Labs  Lab 06/25/18  1808 06/26/18  0003 06/26/18  0739   * 134* 133*   K 3.6 3.6 3.6   CL 97 99 98   CO2 25 26 26   GLU 94 110 119*   BUN 14 14 13   CREATININE 0.6 0.6 0.6   CALCIUM 9.2 9.1 9.3   ANIONGAP 10 9 9   EGFRNONAA >60.0 >60.0 >60.0     All pertinent labs within the past 24 hours have been reviewed.    Significant Imaging: None  "

## 2018-06-26 NOTE — ASSESSMENT & PLAN NOTE
Shanonn Coleman is a 57 y.o. female with a left proximal humerus fracture    - Plan for ORIF on tomorrow  - Discussed case with primary. Low risk for perioperative cardiac issues  - Primary suspects hyponatremia from combination of alcohol intake and HCTZ  - DVT ppx: hold today  - NPO midnightt  - Appreciate assistance by medicine

## 2018-06-26 NOTE — PLAN OF CARE
Problem: Physical Therapy Goal  Goal: Physical Therapy Goal  Discharge from PT   JENNIFER Yanez  6/26/2018

## 2018-06-26 NOTE — PT/OT/SLP EVAL
"Physical Therapy Evaluation and Discharge Note    Patient Name:  Shannon Coleman   MRN:  [id number]    Recommendations:     Discharge Recommendations:  home   Discharge Equipment Recommendations: none   Barriers to discharge: None    Assessment:     Shannon Coleman is a 57 y.o. female admitted with a medical diagnosis of Syncope. At this time, patient is functioning at their prior level of function and does not require further acute PT services.     Recent Surgery: Procedure(s) (LRB):  ORIF, HUMERUS, PROXIMAL- synthes- left- c arm (Left)      Plan:     During this hospitalization, patient does not require further acute PT services.  Please re-consult if situation changes.     Plan of Care Reviewed with: patient    Subjective     Communicated with RN prior to session.  Patient found supine upon PT entry to room, agreeable to evaluation.      Chief Complaint: "I have a broken wing"  Patient comments/goals: Return to PLOF  Pain/Comfort:  · Pain Rating 1: 8/10 (Pain in R proximal humerus)    Patients cultural, spiritual, Restorationism conflicts given the current situation: None    Living Environment:  Pt lives w/ her  in a 1SH w/ a small threshold to enter. Although pt's  will not be available to assist her during the day, her granddaughter and children will be available to assist her during the day if need. Pt has a shower/tub combo.  Prior to admission, patients level of function was independent with mobility and ADL's. Pt was driving herself and is retired.  Patient has the following equipment: none. Upon discharge, patient will have assistance from her , granddaughter, and children.    Objective:     Patient found with: telemetry, pulse ox (continuous)     General Precautions: Standard, fall   Orthopedic Precautions:LUE non weight bearing   Braces: UE Sling     Exams:  · Cognitive Exam: AAOx4  · Sensation: Intact  · RLE ROM: WFL  · RLE Strength: WFL  · LLE ROM: WFL  · LLE Strength: WFL    Functional " Mobility:  · Bed Mobility:  · Supine <> sit- Dorothy  · Transfers:  · Sit <> stand to/from EOB- independent  · Gait:  · 200 ft w/ SBA/SPV and no AD  · No LOB or SOB noted    AM-PAC 6 CLICK MOBILITY  Total Score:23       Therapeutic Activities and Exercises:   Pt educated on discharge from acute PT. Pt verbalized understanding.    Patient left supine with all lines intact and call button in reach.    GOALS:    Physical Therapy Goals        Problem: Physical Therapy Goal    Goal Priority Disciplines Outcome Goal Variances Interventions   Physical Therapy Goal     PT/OT, PT      Description:  Discharge from PT                    History:     Past Medical History:   Diagnosis Date    Allergy     Anxiety     Arthritis     Depression     GERD (gastroesophageal reflux disease)     Hyperlipidemia     Hypertension     Tobacco abuse     Vitamin D deficiency disease        Past Surgical History:   Procedure Laterality Date    child birth         Clinical Decision Making:     History  Co-morbidities and personal factors that may impact the plan of care Examination  Body Structures and Functions, activity limitations and participation restrictions that may impact the plan of care Clinical Presentation   Decision Making/ Complexity Score   Co-morbidities:   [] Time since onset of injury / illness / exacerbation  [] Status of current condition  []Patient's cognitive status and safety concerns    [] Multiple Medical Problems (see med hx)  Personal Factors:   [] Patient's age  [] Prior Level of function   [] Patient's home situation (environment and family support)  [] Patient's level of motivation  [] Expected progression of patient      HISTORY:(criteria)    [x] 40733 - no personal factors/history    [] 44383 - has 1-2 personal factor/comorbidity     [] 46212 - has >3 personal factor/comorbidity     Body Regions:  [] Objective examination findings  [] Head     []  Neck  [] Trunk   [x] Upper Extremity  [] Lower  Extremity    Body Systems:  [] For communication ability, affect, cognition, language, and learning style: the assessment of the ability to make needs known, consciousness, orientation (person, place, and time), expected emotional /behavioral responses, and learning preferences (eg, learning barriers, education  needs)  [] For the neuromuscular system: a general assessment of gross coordinated movement (eg, balance, gait, locomotion, transfers, and transitions) and motor function  (motor control and motor learning)  [] For the musculoskeletal system: the assessment of gross symmetry, gross range of motion, gross strength, height, and weight  [] For the integumentary system: the assessment of pliability(texture), presence of scar formation, skin color, and skin integrity  [] For cardiovascular/pulmonary system: the assessment of heart rate, respiratory rate, blood pressure, and edema     Activity limitations:    [] Patient's cognitive status and saf ety concerns          [] Status of current condition      [] Weight bearing restriction  [] Cardiopulmunary Restriction    Participation Restrictions:   [] Goals and goal agreement with the patient     [] Rehab potential (prognosis) and probable outcome      Examination of Body System: (criteria)    [x] 82633 - addressing 1-2 elements    [] 95003 - addressing a total of 3 or more elements     [] 48023 -  Addressing a total of 4 or more elements         Clinical Presentation: (criteria)  Evolving - 57039     On examination of body system using standardized tests and measures patient presents with 1-2 elements from any of the following: body structures and functions, activity limitations, and/or participation restrictions.  Leading to a clinical presentation that is considered evolving with changing characteristics                              Clinical Decision Making  (Eval Complexity):  Low- 82588     Time Tracking:     PT Received On: 06/26/18  PT Start Time: 0953     PT  Stop Time: 1003  PT Total Time (min): 10 min     Billable Minutes: Evaluation 10 and Total Time 10      Tony Mark, JENNIFER  06/26/2018

## 2018-06-27 ENCOUNTER — ANESTHESIA (OUTPATIENT)
Dept: SURGERY | Facility: HOSPITAL | Age: 58
DRG: 982 | End: 2018-06-27

## 2018-06-27 PROBLEM — I10 ESSENTIAL HYPERTENSION, BENIGN: Status: ACTIVE | Noted: 2018-06-27

## 2018-06-27 LAB
ANION GAP SERPL CALC-SCNC: 11 MMOL/L
BASOPHILS # BLD AUTO: 0.06 K/UL
BASOPHILS NFR BLD: 0.7 %
BUN SERPL-MCNC: 12 MG/DL
CALCIUM SERPL-MCNC: 9.2 MG/DL
CHLORIDE SERPL-SCNC: 102 MMOL/L
CO2 SERPL-SCNC: 24 MMOL/L
CREAT SERPL-MCNC: 0.6 MG/DL
DIFFERENTIAL METHOD: ABNORMAL
EOSINOPHIL # BLD AUTO: 0.2 K/UL
EOSINOPHIL NFR BLD: 2.2 %
ERYTHROCYTE [DISTWIDTH] IN BLOOD BY AUTOMATED COUNT: 20.9 %
EST. GFR  (AFRICAN AMERICAN): >60 ML/MIN/1.73 M^2
EST. GFR  (NON AFRICAN AMERICAN): >60 ML/MIN/1.73 M^2
GLUCOSE SERPL-MCNC: 110 MG/DL
HCT VFR BLD AUTO: 29 %
HGB BLD-MCNC: 8.8 G/DL
IMM GRANULOCYTES # BLD AUTO: 0.06 K/UL
IMM GRANULOCYTES NFR BLD AUTO: 0.7 %
LYMPHOCYTES # BLD AUTO: 1.5 K/UL
LYMPHOCYTES NFR BLD: 16.5 %
MCH RBC QN AUTO: 22.5 PG
MCHC RBC AUTO-ENTMCNC: 30.3 G/DL
MCV RBC AUTO: 74 FL
MONOCYTES # BLD AUTO: 0.9 K/UL
MONOCYTES NFR BLD: 10.1 %
NEUTROPHILS # BLD AUTO: 6.2 K/UL
NEUTROPHILS NFR BLD: 69.8 %
NRBC BLD-RTO: 0 /100 WBC
PLATELET # BLD AUTO: 377 K/UL
PMV BLD AUTO: 9.2 FL
POTASSIUM SERPL-SCNC: 4 MMOL/L
RBC # BLD AUTO: 3.91 M/UL
SODIUM SERPL-SCNC: 137 MMOL/L
WBC # BLD AUTO: 8.83 K/UL

## 2018-06-27 PROCEDURE — 63700000 PHARM REV CODE 250 ALT 637 W/O HCPCS: Performed by: HOSPITALIST

## 2018-06-27 PROCEDURE — 36415 COLL VENOUS BLD VENIPUNCTURE: CPT

## 2018-06-27 PROCEDURE — 85025 COMPLETE CBC W/AUTO DIFF WBC: CPT

## 2018-06-27 PROCEDURE — 99232 SBSQ HOSP IP/OBS MODERATE 35: CPT | Mod: ,,, | Performed by: HOSPITALIST

## 2018-06-27 PROCEDURE — 25000003 PHARM REV CODE 250: Performed by: HOSPITALIST

## 2018-06-27 PROCEDURE — 20600001 HC STEP DOWN PRIVATE ROOM

## 2018-06-27 PROCEDURE — 80048 BASIC METABOLIC PNL TOTAL CA: CPT

## 2018-06-27 RX ORDER — LOSARTAN POTASSIUM 25 MG/1
25 TABLET ORAL DAILY
Status: DISCONTINUED | OUTPATIENT
Start: 2018-06-27 | End: 2018-06-30 | Stop reason: HOSPADM

## 2018-06-27 RX ADMIN — OXYCODONE HYDROCHLORIDE AND ACETAMINOPHEN 1 TABLET: 5; 325 TABLET ORAL at 11:06

## 2018-06-27 RX ADMIN — FENOFIBRATE 160 MG: 160 TABLET ORAL at 08:06

## 2018-06-27 RX ADMIN — LOSARTAN POTASSIUM 25 MG: 25 TABLET, FILM COATED ORAL at 03:06

## 2018-06-27 RX ADMIN — HYDRALAZINE HYDROCHLORIDE 25 MG: 25 TABLET, FILM COATED ORAL at 03:06

## 2018-06-27 RX ADMIN — METOPROLOL SUCCINATE 25 MG: 25 TABLET, EXTENDED RELEASE ORAL at 08:06

## 2018-06-27 RX ADMIN — OXYCODONE HYDROCHLORIDE AND ACETAMINOPHEN 1 TABLET: 5; 325 TABLET ORAL at 05:06

## 2018-06-27 RX ADMIN — HYDRALAZINE HYDROCHLORIDE 25 MG: 25 TABLET, FILM COATED ORAL at 08:06

## 2018-06-27 RX ADMIN — HYDRALAZINE HYDROCHLORIDE 25 MG: 25 TABLET, FILM COATED ORAL at 05:06

## 2018-06-27 RX ADMIN — CITALOPRAM HYDROBROMIDE 40 MG: 40 TABLET ORAL at 08:06

## 2018-06-27 RX ADMIN — AMLODIPINE BESYLATE 10 MG: 10 TABLET ORAL at 08:06

## 2018-06-27 NOTE — ASSESSMENT & PLAN NOTE
Likely hypovolemic hyponatremia 2/2 to diuretics - hctz. Also increased urination with etoh.   On admit. Improved with IVF  - stable now    - no further work up now  - monitor with daily renal panel

## 2018-06-27 NOTE — PLAN OF CARE
Problem: Patient Care Overview  Goal: Plan of Care Review  Outcome: Ongoing (interventions implemented as appropriate)  Plan of care reviewed with patient. Patient remains free from injury. No acute events noted at this time. Plan for surgery this afternoon. Patient remains NPO since midnight. Call bell in reach. Bed in lowest position. Patient instructed to call for assistance when ambulating. Visi and telemetry remains in place. Will continue to monitor.

## 2018-06-27 NOTE — ASSESSMENT & PLAN NOTE
- restarted amlodipine, holding HYZAAR due to concern for electrolytes issue/dehydration for now  - hydralazine TID started

## 2018-06-27 NOTE — SUBJECTIVE & OBJECTIVE
Interval History: No issue overnight, Ortho to take to OR today    Review of Systems   Constitutional: Positive for fatigue. Negative for activity change, appetite change, chills, fever and unexpected weight change.   HENT: Negative for rhinorrhea and sore throat.    Eyes: Negative for pain and visual disturbance.   Respiratory: Negative for cough and shortness of breath.    Cardiovascular: Negative for chest pain and palpitations.   Gastrointestinal: Negative for abdominal pain, diarrhea and nausea.   Genitourinary: Negative for dysuria, hematuria and urgency.   Musculoskeletal: Negative for gait problem and neck stiffness.   Skin: Negative for rash.   Neurological: Positive for syncope. Negative for light-headedness and headaches.     Objective:     Vital Signs (Most Recent):  Temp: 97.7 °F (36.5 °C) (06/27/18 0731)  Pulse: 73 (06/27/18 0732)  Resp: 18 (06/27/18 0731)  BP: (!) 161/81 (06/27/18 0732)  SpO2: (!) 94 % (06/27/18 0731) Vital Signs (24h Range):  Temp:  [97.7 °F (36.5 °C)-98.3 °F (36.8 °C)] 97.7 °F (36.5 °C)  Pulse:  [70-95] 73  Resp:  [15-19] 18  SpO2:  [91 %-95 %] 94 %  BP: (150-182)/(81-95) 161/81     Weight: 105.7 kg (233 lb 0.4 oz)  Body mass index is 36.5 kg/m².    Intake/Output Summary (Last 24 hours) at 06/27/18 0752  Last data filed at 06/27/18 0600   Gross per 24 hour   Intake              660 ml   Output                0 ml   Net              660 ml      Physical Exam   Constitutional: She is oriented to person, place, and time. She appears well-developed and well-nourished.   Cardiovascular: Normal rate, regular rhythm and normal heart sounds.  Exam reveals no gallop and no friction rub.    No murmur heard.  Pulmonary/Chest: Effort normal and breath sounds normal. No respiratory distress. She has no wheezes. She has no rales.   Abdominal: Soft. Bowel sounds are normal. She exhibits no distension. There is no tenderness.   Musculoskeletal:   Left arm in sling    Neurological: She is alert and  oriented to person, place, and time. She displays normal reflexes. No cranial nerve deficit. Coordination normal.   Skin: Skin is warm and dry.       Computed MELD-Na score unavailable. Necessary lab results were not found in the last year.  Computed MELD score unavailable. Necessary lab results were not found in the last year.    Significant Labs:  CBC:    Recent Labs  Lab 06/25/18  0924 06/26/18  0739 06/27/18  0629   WBC 9.65 9.75 8.83   HGB 8.7* 8.8* 8.8*   HCT 28.6* 28.2* 29.0*   * 345 377*     CMP:    Recent Labs  Lab 06/26/18  1259 06/26/18  1729 06/27/18  0629   * 134* 137   K 3.9 4.0 4.0    99 102   CO2 24 22* 24   GLU 84 192* 110   BUN 12 14 12   CREATININE 0.6 0.8 0.6   CALCIUM 9.1 9.1 9.2   ANIONGAP 10 13 11   EGFRNONAA >60.0 >60.0 >60.0     PTINR:  No results for input(s): INR in the last 48 hours.    Significant Procedures:   Dobutamine Stress Test with Color Flow: No results found for this or any previous visit.    2D Echo: No results found for this or any previous visit.     CT scan 6/24: Comminuted fracture of the surgical neck of the left humerus with retraction and mild anteromedial displacement of the major fracture fragment.

## 2018-06-27 NOTE — PROGRESS NOTES
"[facility] Hospital Medicine  Progress Note    Patient Name: Shannon Coleman  MRN: [id number]  Patient Class: IP- Inpatient   Admission Date: 6/24/2018  Length of Stay: 3 days  Attending Physician: Clif Page MD  Primary Care Provider: Ramon Box MD    [facility] Medicine Team: [facility] MED A Clif Page MD    Subjective:     Principal Problem:Syncope    HPI:  57 year old woman with htn on HCTZ who presents after a syncopal episode. Pt states she felt well and was in her normal state of health throughout the day yesterday. She was sitting in her chair outside with her  when she got up to go to the bathroom for a bowel movement. She states she walked into the bathroom and suddenly felt light headed and dizzy then passed out. When she awoke, she denies any confusion but states she had soiled herself. Her  found her and helped her up. She states she continued to feel weak and "drained" but was able to  shower with assistance from her  and go to bed. She reports since the fall her left arm hurt and increasingly became worse thus she went to the ED for evaluation. She does report normal PO intake but reports she did have 4 beers earlier in the afternoon which did cause increased urinary frequency.  She denies any chest pain or palpitations prior to syncopal episode. She also denies any sob, headache, focal deficits, paraesthesias, or tongue biting.     Hospital Course:  Admitted to hospital medicine for hyponatremia and syncope. Also noted to have a left proximal humerus fracture. Orthopedics consulted and following. Na improved with IVF. Echo done for syncope workup w/o AS or WMA. Normal EF. Plan for ORIF on 6/27 per ortho.     Interval History: No issue overnight, Ortho to take to OR today    Review of Systems   Constitutional: Positive for fatigue. Negative for activity change, appetite change, chills, fever and unexpected weight change.   HENT: Negative for rhinorrhea " and sore throat.    Eyes: Negative for pain and visual disturbance.   Respiratory: Negative for cough and shortness of breath.    Cardiovascular: Negative for chest pain and palpitations.   Gastrointestinal: Negative for abdominal pain, diarrhea and nausea.   Genitourinary: Negative for dysuria, hematuria and urgency.   Musculoskeletal: Negative for gait problem and neck stiffness.   Skin: Negative for rash.   Neurological: Positive for syncope. Negative for light-headedness and headaches.     Objective:     Vital Signs (Most Recent):  Temp: 97.7 °F (36.5 °C) (06/27/18 0731)  Pulse: 73 (06/27/18 0732)  Resp: 18 (06/27/18 0731)  BP: (!) 161/81 (06/27/18 0732)  SpO2: (!) 94 % (06/27/18 0731) Vital Signs (24h Range):  Temp:  [97.7 °F (36.5 °C)-98.3 °F (36.8 °C)] 97.7 °F (36.5 °C)  Pulse:  [70-95] 73  Resp:  [15-19] 18  SpO2:  [91 %-95 %] 94 %  BP: (150-182)/(81-95) 161/81     Weight: 105.7 kg (233 lb 0.4 oz)  Body mass index is 36.5 kg/m².    Intake/Output Summary (Last 24 hours) at 06/27/18 0752  Last data filed at 06/27/18 0600   Gross per 24 hour   Intake              660 ml   Output                0 ml   Net              660 ml      Physical Exam   Constitutional: She is oriented to person, place, and time. She appears well-developed and well-nourished.   Cardiovascular: Normal rate, regular rhythm and normal heart sounds.  Exam reveals no gallop and no friction rub.    No murmur heard.  Pulmonary/Chest: Effort normal and breath sounds normal. No respiratory distress. She has no wheezes. She has no rales.   Abdominal: Soft. Bowel sounds are normal. She exhibits no distension. There is no tenderness.   Musculoskeletal:   Left arm in sling    Neurological: She is alert and oriented to person, place, and time. She displays normal reflexes. No cranial nerve deficit. Coordination normal.   Skin: Skin is warm and dry.       Computed MELD-Na score unavailable. Necessary lab results were not found in the last  year.  Computed MELD score unavailable. Necessary lab results were not found in the last year.    Significant Labs:  CBC:    Recent Labs  Lab 06/25/18  0924 06/26/18  0739 06/27/18  0629   WBC 9.65 9.75 8.83   HGB 8.7* 8.8* 8.8*   HCT 28.6* 28.2* 29.0*   * 345 377*     CMP:    Recent Labs  Lab 06/26/18  1259 06/26/18  1729 06/27/18  0629   * 134* 137   K 3.9 4.0 4.0    99 102   CO2 24 22* 24   GLU 84 192* 110   BUN 12 14 12   CREATININE 0.6 0.8 0.6   CALCIUM 9.1 9.1 9.2   ANIONGAP 10 13 11   EGFRNONAA >60.0 >60.0 >60.0     PTINR:  No results for input(s): INR in the last 48 hours.    Significant Procedures:   Dobutamine Stress Test with Color Flow: No results found for this or any previous visit.    2D Echo: No results found for this or any previous visit.     CT scan 6/24: Comminuted fracture of the surgical neck of the left humerus with retraction and mild anteromedial displacement of the major fracture fragment.    Assessment/Plan:      * Syncope    Pt with a syncopal episode - likely related to orthostasis given labs hypovolemic hyponatremia   - Echo w aortic sclerosis w/o stenosis. No WMA. Normal EF.   - monitor on telemetry for arrhythmia - no events so far   - ddx includes seizure given BM; spot EEG done, neg so far  - stable          Essential hypertension, benign    - restarted amlodipine, holding HYZAAR due to concern for electrolytes issue/dehydration for now  - hydralazine TID started          Hyponatremia    Likely hypovolemic hyponatremia 2/2 to diuretics - hctz. Also increased urination with etoh.   On admit. Improved with IVF  - stable now    - no further work up now  - monitor with daily renal panel            Closed fracture of proximal end of left humerus    Orthopedics following.   - pain control with perocet 5-325   -CT scan shows a c omminuted fracture of the surgical neck of the left humerus with retraction and mild anteromedial displacement of the major fracture  fragment.  - Ortho to perform ORIF 6/27   - RCRI score of 0-1 which corresponds to up to a 1% chance of perioperative cardiac events. No further testing needed prior to surgery.         Depression    Stable   Cont home citalopram           VTE Risk Mitigation         Ordered     Place KALIN hose  Until discontinued      06/25/18 1538     Place sequential compression device  Until discontinued      06/25/18 1538     IP VTE HIGH RISK PATIENT  Once      06/24/18 0504     Place sequential compression device  Until discontinued      06/24/18 0504              Clif Page MD  Department of Hospital Medicine   [facility]

## 2018-06-27 NOTE — ASSESSMENT & PLAN NOTE
Likely hypovolemic hyponatremia 2/2 to diuretics - hctz. Also increased urination with etoh.   On admit. 133 today   - stable now at 133-134  - no further work up now  - monitor with daily renal panel

## 2018-06-27 NOTE — PROGRESS NOTES
"[facility] Hospital Medicine  Progress Note    Patient Name: Shannon Coleman  MRN: [id number]  Patient Class: IP- Inpatient   Admission Date: 6/24/2018  Length of Stay: 2 days  Attending Physician: Clif Page MD  Primary Care Provider: Ramon Box MD    [facility] Medicine Team: [facility] MED A Clif Page MD    Subjective:     Principal Problem:Syncope    HPI:  57 year old woman with htn on HCTZ who presents after a syncopal episode. Pt states she felt well and was in her normal state of health throughout the day yesterday. She was sitting in her chair outside with her  when she got up to go to the bathroom for a bowel movement. She states she walked into the bathroom and suddenly felt light headed and dizzy then passed out. When she awoke, she denies any confusion but states she had soiled herself. Her  found her and helped her up. She states she continued to feel weak and "drained" but was able to  shower with assistance from her  and go to bed. She reports since the fall her left arm hurt and increasingly became worse thus she went to the ED for evaluation. She does report normal PO intake but reports she did have 4 beers earlier in the afternoon which did cause increased urinary frequency.  She denies any chest pain or palpitations prior to syncopal episode. She also denies any sob, headache, focal deficits, paraesthesias, or tongue biting.     Hospital Course:  Admitted to hospital medicine for hyponatremia and syncope. Also noted to have a left proximal humerus fracture. Orthopedics consulted and following. Na improved with IVF. Echo done for syncope workup w/o AS or WMA. Normal EF. Plan for ORIF on 6/27 per ortho.     Interval History: P feels overall improved today. Complains of left arm pain.  EEG underway this AM, Ortho will take pt to OR tomorrow    Review of Systems   Constitutional: Positive for fatigue. Negative for activity change, appetite change, " chills, fever and unexpected weight change.   HENT: Negative for rhinorrhea and sore throat.    Eyes: Negative for pain and visual disturbance.   Respiratory: Negative for cough and shortness of breath.    Cardiovascular: Negative for chest pain and palpitations.   Gastrointestinal: Negative for abdominal pain, diarrhea and nausea.   Genitourinary: Negative for dysuria, hematuria and urgency.   Musculoskeletal: Negative for gait problem and neck stiffness.   Skin: Negative for rash.   Neurological: Positive for syncope. Negative for light-headedness and headaches.     Objective:     Vital Signs (Most Recent):  Temp: 98.3 °F (36.8 °C) (06/26/18 2015)  Pulse: 84 (06/26/18 2015)  Resp: 19 (06/26/18 2015)  BP: (!) 152/83 (06/26/18 2015)  SpO2: 95 % (06/26/18 2015) Vital Signs (24h Range):  Temp:  [98.3 °F (36.8 °C)] 98.3 °F (36.8 °C)  Pulse:  [71-95] 84  Resp:  [15-19] 19  SpO2:  [93 %-95 %] 95 %  BP: (152-174)/(83-95) 152/83     Weight: 105.7 kg (233 lb 0.4 oz)  Body mass index is 36.5 kg/m².    Intake/Output Summary (Last 24 hours) at 06/26/18 2258  Last data filed at 06/26/18 1300   Gross per 24 hour   Intake             1460 ml   Output                0 ml   Net             1460 ml      Physical Exam   Constitutional: She is oriented to person, place, and time. She appears well-developed and well-nourished.   Cardiovascular: Normal rate, regular rhythm and normal heart sounds.  Exam reveals no gallop and no friction rub.    No murmur heard.  Pulmonary/Chest: Effort normal and breath sounds normal. No respiratory distress. She has no wheezes. She has no rales.   Abdominal: Soft. Bowel sounds are normal. She exhibits no distension. There is no tenderness.   Musculoskeletal:   Left arm in sling    Neurological: She is alert and oriented to person, place, and time. She displays normal reflexes. No cranial nerve deficit. Coordination normal.   Skin: Skin is warm and dry.       Computed MELD-Na score unavailable.  Necessary lab results were not found in the last year.  Computed MELD score unavailable. Necessary lab results were not found in the last year.    Significant Labs:  CBC:    Recent Labs  Lab 06/25/18  0924 06/26/18  0739   WBC 9.65 9.75   HGB 8.7* 8.8*   HCT 28.6* 28.2*   * 345     CMP:    Recent Labs  Lab 06/26/18  0739 06/26/18  1259 06/26/18  1729   * 134* 134*   K 3.6 3.9 4.0   CL 98 100 99   CO2 26 24 22*   * 84 192*   BUN 13 12 14   CREATININE 0.6 0.6 0.8   CALCIUM 9.3 9.1 9.1   ANIONGAP 9 10 13   EGFRNONAA >60.0 >60.0 >60.0     PTINR:  No results for input(s): INR in the last 48 hours.    Significant Procedures:   Dobutamine Stress Test with Color Flow: No results found for this or any previous visit.    2D Echo: No results found for this or any previous visit.     CT scan 6/24: Comminuted fracture of the surgical neck of the left humerus with retraction and mild anteromedial displacement of the major fracture fragment.    Assessment/Plan:      * Syncope    Pt with a syncopal episode - likely related to orthostasis given labs hypovolemic hyponatremia   - Echo w aortic sclerosis w/o stenosis. No WMA. Normal EF.   - monitor on telemetry for arrhythmia - no events so far   - ddx includes seizure given BM; spot EEG done          Hyponatremia    Likely hypovolemic hyponatremia 2/2 to diuretics - hctz. Also increased urination with etoh.   On admit. 133 today   - stable now at 133-134  - no further work up now  - monitor with daily renal panel            Closed fracture of proximal end of left humerus    Orthopedics following.   - pain control with perocet 5-325   -CT scan shows a c omminuted fracture of the surgical neck of the left humerus with retraction and mild anteromedial displacement of the major fracture fragment.  - Ortho to perform ORIF 6/27   - RCRI score of 0-1 which corresponds to up to a 1% chance of perioperative cardiac events. No further testing needed prior to surgery.          Depression    Stable   Cont home citalopram           VTE Risk Mitigation         Ordered     Place KALIN hose  Until discontinued      06/25/18 1538     Place sequential compression device  Until discontinued      06/25/18 1538     IP VTE HIGH RISK PATIENT  Once      06/24/18 0504     Place sequential compression device  Until discontinued      06/24/18 0504              Clif Page MD  Department of Hospital Medicine   [facility]

## 2018-06-27 NOTE — ASSESSMENT & PLAN NOTE
Shannon Coleman is a 57 y.o. female with a left proximal humerus fracture    - Plan for ORIF on today  - Discussed case with primary. Low risk for perioperative cardiac issues  - Primary suspects hyponatremia from combination of alcohol intake and HCTZ  - DVT ppx: held yesterday  - NPO now  - Appreciate assistance by medicine

## 2018-06-27 NOTE — ASSESSMENT & PLAN NOTE
Pt with a syncopal episode - likely related to orthostasis given labs hypovolemic hyponatremia   - Echo w aortic sclerosis w/o stenosis. No WMA. Normal EF.   - monitor on telemetry for arrhythmia - no events so far   - ddx includes seizure given BM; spot EEG done, neg so far  - stable

## 2018-06-27 NOTE — PROGRESS NOTES
"[facility]  Orthopedics  Progress Note    Attg Note:  I agree with the above assessment and plan.  Due to equipment issues, will take for ORIF tomorrow.      Quinn Parker MD      Patient Name: Shannon Coleman  MRN: [id number]  Admission Date: 6/24/2018  Hospital Length of Stay: 3 days  Attending Provider: Clif Page MD  Primary Care Provider: Ramon Box MD  Follow-up For: Procedure(s) (LRB):  ORIF, HUMERUS, PROXIMAL- synthes- left- c arm (Left)    Post-Operative Day:    Subjective:     Principal Problem:Syncope    Principal Orthopedic Problem: left proximal humerus fracture    Interval History: Patient seen and examined at bedside.  No acute events overnight.  Pain controlled.  EEG performed and preliminarily read as normal.    Review of patient's allergies indicates:  No Known Allergies    Current Facility-Administered Medications   Medication    amLODIPine tablet 10 mg    citalopram tablet 40 mg    fenofibrate tablet 160 mg    hydrALAZINE tablet 25 mg    metoprolol succinate (TOPROL-XL) 24 hr tablet 25 mg    oxyCODONE-acetaminophen 5-325 mg per tablet 1 tablet     Objective:     Vital Signs (Most Recent):  Temp: 98.3 °F (36.8 °C) (06/26/18 2015)  Pulse: 70 (06/27/18 0510)  Resp: 18 (06/27/18 0400)  BP: (!) 150/81 (06/27/18 0400)  SpO2: (!) 91 % (06/27/18 0400) Vital Signs (24h Range):  Temp:  [98.3 °F (36.8 °C)] 98.3 °F (36.8 °C)  Pulse:  [70-95] 70  Resp:  [15-19] 18  SpO2:  [91 %-95 %] 91 %  BP: (150-174)/(81-95) 150/81     Weight: 105.7 kg (233 lb 0.4 oz)  Height: 5' 7" (170.2 cm)  Body mass index is 36.5 kg/m².      Intake/Output Summary (Last 24 hours) at 06/27/18 0547  Last data filed at 06/26/18 2300   Gross per 24 hour   Intake              960 ml   Output                0 ml   Net              960 ml       Ortho/SPM Exam    AAOx4  NAD  RRR  No increased WOB    LUE:  Sling in plain  EMIR STACK, M, R, U intact    Significant Labs:   CBC:     Recent Labs  Lab 06/25/18  0947 " 06/26/18  0739   WBC 9.65 9.75   HGB 8.7* 8.8*   HCT 28.6* 28.2*   * 345     CMP:     Recent Labs  Lab 06/26/18  0739 06/26/18  1259 06/26/18  1729   * 134* 134*   K 3.6 3.9 4.0   CL 98 100 99   CO2 26 24 22*   * 84 192*   BUN 13 12 14   CREATININE 0.6 0.6 0.8   CALCIUM 9.3 9.1 9.1   ANIONGAP 9 10 13   EGFRNONAA >60.0 >60.0 >60.0     All pertinent labs within the past 24 hours have been reviewed.    Significant Imaging: None    Assessment/Plan:     Closed fracture of proximal end of left humerus    Shannon Coleman is a 57 y.o. female with a left proximal humerus fracture    - Plan for ORIF on today  - Discussed case with primary. Low risk for perioperative cardiac issues  - Primary suspects hyponatremia from combination of alcohol intake and HCTZ  - DVT ppx: held yesterday  - NPO now  - Appreciate assistance by medicine              Christian Henderson MD  Orthopedics  [facility]

## 2018-06-27 NOTE — SUBJECTIVE & OBJECTIVE
"Principal Problem:Syncope    Principal Orthopedic Problem: left proximal humerus fracture    Interval History: Patient seen and examined at bedside.  No acute events overnight.  Pain controlled.  EEG performed and preliminarily read as normal.    Review of patient's allergies indicates:  No Known Allergies    Current Facility-Administered Medications   Medication    amLODIPine tablet 10 mg    citalopram tablet 40 mg    fenofibrate tablet 160 mg    hydrALAZINE tablet 25 mg    metoprolol succinate (TOPROL-XL) 24 hr tablet 25 mg    oxyCODONE-acetaminophen 5-325 mg per tablet 1 tablet     Objective:     Vital Signs (Most Recent):  Temp: 98.3 °F (36.8 °C) (06/26/18 2015)  Pulse: 70 (06/27/18 0510)  Resp: 18 (06/27/18 0400)  BP: (!) 150/81 (06/27/18 0400)  SpO2: (!) 91 % (06/27/18 0400) Vital Signs (24h Range):  Temp:  [98.3 °F (36.8 °C)] 98.3 °F (36.8 °C)  Pulse:  [70-95] 70  Resp:  [15-19] 18  SpO2:  [91 %-95 %] 91 %  BP: (150-174)/(81-95) 150/81     Weight: 105.7 kg (233 lb 0.4 oz)  Height: 5' 7" (170.2 cm)  Body mass index is 36.5 kg/m².      Intake/Output Summary (Last 24 hours) at 06/27/18 0547  Last data filed at 06/26/18 2300   Gross per 24 hour   Intake              960 ml   Output                0 ml   Net              960 ml       Ortho/SPM Exam    AAOx4  NAD  RRR  No increased WOB    LUE:  Sling in plain  AIN, PIN, M, R, U intact    Significant Labs:   CBC:     Recent Labs  Lab 06/25/18  0924 06/26/18  0739   WBC 9.65 9.75   HGB 8.7* 8.8*   HCT 28.6* 28.2*   * 345     CMP:     Recent Labs  Lab 06/26/18  0739 06/26/18  1259 06/26/18  1729   * 134* 134*   K 3.6 3.9 4.0   CL 98 100 99   CO2 26 24 22*   * 84 192*   BUN 13 12 14   CREATININE 0.6 0.6 0.8   CALCIUM 9.3 9.1 9.1   ANIONGAP 9 10 13   EGFRNONAA >60.0 >60.0 >60.0     All pertinent labs within the past 24 hours have been reviewed.    Significant Imaging: None  "

## 2018-06-27 NOTE — ASSESSMENT & PLAN NOTE
Pt with a syncopal episode - likely related to orthostasis given labs hypovolemic hyponatremia   - Echo w aortic sclerosis w/o stenosis. No WMA. Normal EF.   - monitor on telemetry for arrhythmia - no events so far   - ddx includes seizure given BM; spot EEG done

## 2018-06-27 NOTE — SUBJECTIVE & OBJECTIVE
Interval History: P feels overall improved today. Complains of left arm pain.  EEG underway this AM, Ortho will take pt to OR tomorrow    Review of Systems   Constitutional: Positive for fatigue. Negative for activity change, appetite change, chills, fever and unexpected weight change.   HENT: Negative for rhinorrhea and sore throat.    Eyes: Negative for pain and visual disturbance.   Respiratory: Negative for cough and shortness of breath.    Cardiovascular: Negative for chest pain and palpitations.   Gastrointestinal: Negative for abdominal pain, diarrhea and nausea.   Genitourinary: Negative for dysuria, hematuria and urgency.   Musculoskeletal: Negative for gait problem and neck stiffness.   Skin: Negative for rash.   Neurological: Positive for syncope. Negative for light-headedness and headaches.     Objective:     Vital Signs (Most Recent):  Temp: 98.3 °F (36.8 °C) (06/26/18 2015)  Pulse: 84 (06/26/18 2015)  Resp: 19 (06/26/18 2015)  BP: (!) 152/83 (06/26/18 2015)  SpO2: 95 % (06/26/18 2015) Vital Signs (24h Range):  Temp:  [98.3 °F (36.8 °C)] 98.3 °F (36.8 °C)  Pulse:  [71-95] 84  Resp:  [15-19] 19  SpO2:  [93 %-95 %] 95 %  BP: (152-174)/(83-95) 152/83     Weight: 105.7 kg (233 lb 0.4 oz)  Body mass index is 36.5 kg/m².    Intake/Output Summary (Last 24 hours) at 06/26/18 0286  Last data filed at 06/26/18 1300   Gross per 24 hour   Intake             1460 ml   Output                0 ml   Net             1460 ml      Physical Exam   Constitutional: She is oriented to person, place, and time. She appears well-developed and well-nourished.   Cardiovascular: Normal rate, regular rhythm and normal heart sounds.  Exam reveals no gallop and no friction rub.    No murmur heard.  Pulmonary/Chest: Effort normal and breath sounds normal. No respiratory distress. She has no wheezes. She has no rales.   Abdominal: Soft. Bowel sounds are normal. She exhibits no distension. There is no tenderness.   Musculoskeletal:    Left arm in sling    Neurological: She is alert and oriented to person, place, and time. She displays normal reflexes. No cranial nerve deficit. Coordination normal.   Skin: Skin is warm and dry.       Computed MELD-Na score unavailable. Necessary lab results were not found in the last year.  Computed MELD score unavailable. Necessary lab results were not found in the last year.    Significant Labs:  CBC:    Recent Labs  Lab 06/25/18  0924 06/26/18  0739   WBC 9.65 9.75   HGB 8.7* 8.8*   HCT 28.6* 28.2*   * 345     CMP:    Recent Labs  Lab 06/26/18  0739 06/26/18  1259 06/26/18  1729   * 134* 134*   K 3.6 3.9 4.0   CL 98 100 99   CO2 26 24 22*   * 84 192*   BUN 13 12 14   CREATININE 0.6 0.6 0.8   CALCIUM 9.3 9.1 9.1   ANIONGAP 9 10 13   EGFRNONAA >60.0 >60.0 >60.0     PTINR:  No results for input(s): INR in the last 48 hours.    Significant Procedures:   Dobutamine Stress Test with Color Flow: No results found for this or any previous visit.    2D Echo: No results found for this or any previous visit.     CT scan 6/24: Comminuted fracture of the surgical neck of the left humerus with retraction and mild anteromedial displacement of the major fracture fragment.

## 2018-06-27 NOTE — PLAN OF CARE
Problem: Patient Care Overview  Goal: Plan of Care Review  Outcome: Ongoing (interventions implemented as appropriate)  POC reviewed with pt and she verbalized understanding. VSS and left shoulder pain managed with PRN Percocet. Pt NPO at midnight for ORIF on calendar for 1415. Left arm maintained in sling. Fall bundle implemented and pt has remained free of falls and injuries. Pt in no apparent sign of distress, will continue to monitor.

## 2018-06-27 NOTE — NURSING
Resume care from previous nurse, pt voice no complaints, lying in bed w/ bed in lowest position and locked. Family is at bedside. visi connected, ipad missing from previous pt.  Pt is schedule for surgery 1400.  Pt is currently npo for surgery. Call bell within reach, introduce myself to pt. Will continue to monitor pt status

## 2018-06-27 NOTE — PLAN OF CARE
Problem: Patient Care Overview  Goal: Plan of Care Review  Outcome: Revised  Pt free of falls/trauma/injuries.  Denies c/o SOB,CP.Pt's arm pain being treated with PO analgesics.  Generalized skin remains CDI; No edema noted.  Pt having ORIF tomorrow.  Pt tolerating plan of care.

## 2018-06-28 LAB
ABO + RH BLD: NORMAL
ALBUMIN SERPL BCP-MCNC: 2.9 G/DL
ALP SERPL-CCNC: 56 U/L
ALT SERPL W/O P-5'-P-CCNC: 34 U/L
ANION GAP SERPL CALC-SCNC: 12 MMOL/L
ANION GAP SERPL CALC-SCNC: 8 MMOL/L
AST SERPL-CCNC: 45 U/L
BASOPHILS # BLD AUTO: 0.06 K/UL
BASOPHILS # BLD AUTO: 0.07 K/UL
BASOPHILS NFR BLD: 0.5 %
BASOPHILS NFR BLD: 0.8 %
BILIRUB SERPL-MCNC: 0.5 MG/DL
BLD GP AB SCN CELLS X3 SERPL QL: NORMAL
BUN SERPL-MCNC: 11 MG/DL
BUN SERPL-MCNC: 13 MG/DL
CALCIUM SERPL-MCNC: 8.3 MG/DL
CALCIUM SERPL-MCNC: 9.1 MG/DL
CHLORIDE SERPL-SCNC: 103 MMOL/L
CHLORIDE SERPL-SCNC: 104 MMOL/L
CO2 SERPL-SCNC: 24 MMOL/L
CO2 SERPL-SCNC: 27 MMOL/L
CREAT SERPL-MCNC: 0.6 MG/DL
CREAT SERPL-MCNC: 0.6 MG/DL
DIFFERENTIAL METHOD: ABNORMAL
DIFFERENTIAL METHOD: ABNORMAL
EOSINOPHIL # BLD AUTO: 0 K/UL
EOSINOPHIL # BLD AUTO: 0.2 K/UL
EOSINOPHIL NFR BLD: 0.3 %
EOSINOPHIL NFR BLD: 2 %
ERYTHROCYTE [DISTWIDTH] IN BLOOD BY AUTOMATED COUNT: 20.8 %
ERYTHROCYTE [DISTWIDTH] IN BLOOD BY AUTOMATED COUNT: 21 %
EST. GFR  (AFRICAN AMERICAN): >60 ML/MIN/1.73 M^2
EST. GFR  (AFRICAN AMERICAN): >60 ML/MIN/1.73 M^2
EST. GFR  (NON AFRICAN AMERICAN): >60 ML/MIN/1.73 M^2
EST. GFR  (NON AFRICAN AMERICAN): >60 ML/MIN/1.73 M^2
GLUCOSE SERPL-MCNC: 105 MG/DL
GLUCOSE SERPL-MCNC: 130 MG/DL
HCT VFR BLD AUTO: 24.4 %
HCT VFR BLD AUTO: 26.6 %
HGB BLD-MCNC: 7.6 G/DL
HGB BLD-MCNC: 8.1 G/DL
IMM GRANULOCYTES # BLD AUTO: 0.06 K/UL
IMM GRANULOCYTES # BLD AUTO: 0.08 K/UL
IMM GRANULOCYTES NFR BLD AUTO: 0.6 %
IMM GRANULOCYTES NFR BLD AUTO: 0.7 %
LYMPHOCYTES # BLD AUTO: 1 K/UL
LYMPHOCYTES # BLD AUTO: 2 K/UL
LYMPHOCYTES NFR BLD: 21.3 %
LYMPHOCYTES NFR BLD: 8.4 %
MCH RBC QN AUTO: 22.6 PG
MCH RBC QN AUTO: 23.1 PG
MCHC RBC AUTO-ENTMCNC: 30.5 G/DL
MCHC RBC AUTO-ENTMCNC: 31.1 G/DL
MCV RBC AUTO: 74 FL
MCV RBC AUTO: 74 FL
MONOCYTES # BLD AUTO: 0.8 K/UL
MONOCYTES # BLD AUTO: 1 K/UL
MONOCYTES NFR BLD: 10.5 %
MONOCYTES NFR BLD: 6.8 %
NEUTROPHILS # BLD AUTO: 6 K/UL
NEUTROPHILS # BLD AUTO: 9.8 K/UL
NEUTROPHILS NFR BLD: 64.8 %
NEUTROPHILS NFR BLD: 83.3 %
NRBC BLD-RTO: 0 /100 WBC
NRBC BLD-RTO: 0 /100 WBC
PLATELET # BLD AUTO: 369 K/UL
PLATELET # BLD AUTO: 400 K/UL
PMV BLD AUTO: 9.5 FL
PMV BLD AUTO: 9.7 FL
POCT GLUCOSE: 133 MG/DL (ref 70–110)
POTASSIUM SERPL-SCNC: 3.4 MMOL/L
POTASSIUM SERPL-SCNC: 3.5 MMOL/L
PROT SERPL-MCNC: 6.2 G/DL
RBC # BLD AUTO: 3.29 M/UL
RBC # BLD AUTO: 3.58 M/UL
SODIUM SERPL-SCNC: 139 MMOL/L
SODIUM SERPL-SCNC: 139 MMOL/L
WBC # BLD AUTO: 11.79 K/UL
WBC # BLD AUTO: 9.31 K/UL

## 2018-06-28 PROCEDURE — 76942 ECHO GUIDE FOR BIOPSY: CPT | Mod: 26,,, | Performed by: ANESTHESIOLOGY

## 2018-06-28 PROCEDURE — D9220A PRA ANESTHESIA: Mod: ,,, | Performed by: ANESTHESIOLOGY

## 2018-06-28 PROCEDURE — 71000039 HC RECOVERY, EACH ADD'L HOUR: Performed by: ORTHOPAEDIC SURGERY

## 2018-06-28 PROCEDURE — 25000003 PHARM REV CODE 250: Performed by: NURSE ANESTHETIST, CERTIFIED REGISTERED

## 2018-06-28 PROCEDURE — 63600175 PHARM REV CODE 636 W HCPCS: Performed by: STUDENT IN AN ORGANIZED HEALTH CARE EDUCATION/TRAINING PROGRAM

## 2018-06-28 PROCEDURE — 0PSG04Z REPOSITION LEFT HUMERAL SHAFT WITH INTERNAL FIXATION DEVICE, OPEN APPROACH: ICD-10-PCS | Performed by: ORTHOPAEDIC SURGERY

## 2018-06-28 PROCEDURE — 27201423 OPTIME MED/SURG SUP & DEVICES STERILE SUPPLY: Performed by: ORTHOPAEDIC SURGERY

## 2018-06-28 PROCEDURE — 82962 GLUCOSE BLOOD TEST: CPT | Performed by: ORTHOPAEDIC SURGERY

## 2018-06-28 PROCEDURE — 11000001 HC ACUTE MED/SURG PRIVATE ROOM

## 2018-06-28 PROCEDURE — 86850 RBC ANTIBODY SCREEN: CPT

## 2018-06-28 PROCEDURE — 63600175 PHARM REV CODE 636 W HCPCS

## 2018-06-28 PROCEDURE — C1769 GUIDE WIRE: HCPCS | Performed by: ORTHOPAEDIC SURGERY

## 2018-06-28 PROCEDURE — 36000711: Performed by: ORTHOPAEDIC SURGERY

## 2018-06-28 PROCEDURE — 25000003 PHARM REV CODE 250: Performed by: STUDENT IN AN ORGANIZED HEALTH CARE EDUCATION/TRAINING PROGRAM

## 2018-06-28 PROCEDURE — 24515 OPTX HUMRL SHFT FX PLATE/SCR: CPT | Mod: 51,LT,, | Performed by: ORTHOPAEDIC SURGERY

## 2018-06-28 PROCEDURE — 36000710: Performed by: ORTHOPAEDIC SURGERY

## 2018-06-28 PROCEDURE — 25000003 PHARM REV CODE 250: Performed by: ANESTHESIOLOGY

## 2018-06-28 PROCEDURE — 64416 NJX AA&/STRD BRCH PL NFS IMG: CPT | Performed by: ANESTHESIOLOGY

## 2018-06-28 PROCEDURE — 86920 COMPATIBILITY TEST SPIN: CPT

## 2018-06-28 PROCEDURE — 23615 OPTX PROX HUMRL FX W/INT FIX: CPT | Mod: LT,,, | Performed by: ORTHOPAEDIC SURGERY

## 2018-06-28 PROCEDURE — 25000003 PHARM REV CODE 250: Performed by: HOSPITALIST

## 2018-06-28 PROCEDURE — 94761 N-INVAS EAR/PLS OXIMETRY MLT: CPT

## 2018-06-28 PROCEDURE — 36415 COLL VENOUS BLD VENIPUNCTURE: CPT

## 2018-06-28 PROCEDURE — 71000033 HC RECOVERY, INTIAL HOUR: Performed by: ORTHOPAEDIC SURGERY

## 2018-06-28 PROCEDURE — 85025 COMPLETE CBC W/AUTO DIFF WBC: CPT | Mod: 91

## 2018-06-28 PROCEDURE — 27000221 HC OXYGEN, UP TO 24 HOURS

## 2018-06-28 PROCEDURE — 63600175 PHARM REV CODE 636 W HCPCS: Performed by: ORTHOPAEDIC SURGERY

## 2018-06-28 PROCEDURE — 80048 BASIC METABOLIC PNL TOTAL CA: CPT

## 2018-06-28 PROCEDURE — 99232 SBSQ HOSP IP/OBS MODERATE 35: CPT | Mod: ,,, | Performed by: HOSPITALIST

## 2018-06-28 PROCEDURE — S0077 INJECTION, CLINDAMYCIN PHOSP: HCPCS | Performed by: STUDENT IN AN ORGANIZED HEALTH CARE EDUCATION/TRAINING PROGRAM

## 2018-06-28 PROCEDURE — 63600175 PHARM REV CODE 636 W HCPCS: Performed by: NURSE ANESTHETIST, CERTIFIED REGISTERED

## 2018-06-28 PROCEDURE — 37000008 HC ANESTHESIA 1ST 15 MINUTES: Performed by: ORTHOPAEDIC SURGERY

## 2018-06-28 PROCEDURE — C1713 ANCHOR/SCREW BN/BN,TIS/BN: HCPCS | Performed by: ORTHOPAEDIC SURGERY

## 2018-06-28 PROCEDURE — 37000009 HC ANESTHESIA EA ADD 15 MINS: Performed by: ORTHOPAEDIC SURGERY

## 2018-06-28 PROCEDURE — 80053 COMPREHEN METABOLIC PANEL: CPT

## 2018-06-28 DEVICE — SCREW LOCKING 3.5MM X 36MM: Type: IMPLANTABLE DEVICE | Site: HUMERUS | Status: FUNCTIONAL

## 2018-06-28 DEVICE — SCREW CORTEX 3.5MM X 28MM: Type: IMPLANTABLE DEVICE | Site: HUMERUS | Status: FUNCTIONAL

## 2018-06-28 DEVICE — SCREW LOCKING 3.5MM X 50MM: Type: IMPLANTABLE DEVICE | Site: HUMERUS | Status: FUNCTIONAL

## 2018-06-28 DEVICE — SCREW CORTEX 2.7MM X 24: Type: IMPLANTABLE DEVICE | Site: HUMERUS | Status: FUNCTIONAL

## 2018-06-28 DEVICE — SCREW LOCKING 3.5MM X 40MM: Type: IMPLANTABLE DEVICE | Site: HUMERUS | Status: FUNCTIONAL

## 2018-06-28 DEVICE — SCREW LOCKING 3.5MM X 45MM: Type: IMPLANTABLE DEVICE | Site: HUMERUS | Status: FUNCTIONAL

## 2018-06-28 DEVICE — SCREW LOCKING 3.5MM X 34MM: Type: IMPLANTABLE DEVICE | Site: HUMERUS | Status: FUNCTIONAL

## 2018-06-28 DEVICE — SCREW LOCKING 3.5MM/32MM RECES: Type: IMPLANTABLE DEVICE | Site: HUMERUS | Status: FUNCTIONAL

## 2018-06-28 DEVICE — SCREW CORTEX 3.5 X 26: Type: IMPLANTABLE DEVICE | Site: HUMERUS | Status: FUNCTIONAL

## 2018-06-28 RX ORDER — LIDOCAINE HCL/PF 100 MG/5ML
SYRINGE (ML) INTRAVENOUS
Status: DISCONTINUED | OUTPATIENT
Start: 2018-06-28 | End: 2018-06-28

## 2018-06-28 RX ORDER — SODIUM CHLORIDE 0.9 % (FLUSH) 0.9 %
3 SYRINGE (ML) INJECTION
Status: CANCELLED | OUTPATIENT
Start: 2018-06-28

## 2018-06-28 RX ORDER — SODIUM CHLORIDE, SODIUM LACTATE, POTASSIUM CHLORIDE, CALCIUM CHLORIDE 600; 310; 30; 20 MG/100ML; MG/100ML; MG/100ML; MG/100ML
INJECTION, SOLUTION INTRAVENOUS CONTINUOUS PRN
Status: DISCONTINUED | OUTPATIENT
Start: 2018-06-28 | End: 2018-06-28

## 2018-06-28 RX ORDER — PREGABALIN 150 MG/1
150 CAPSULE ORAL NIGHTLY
Status: DISCONTINUED | OUTPATIENT
Start: 2018-06-28 | End: 2018-06-30 | Stop reason: HOSPADM

## 2018-06-28 RX ORDER — ONDANSETRON 2 MG/ML
4 INJECTION INTRAMUSCULAR; INTRAVENOUS EVERY 12 HOURS PRN
Status: DISCONTINUED | OUTPATIENT
Start: 2018-06-28 | End: 2018-06-30 | Stop reason: HOSPADM

## 2018-06-28 RX ORDER — PROPOFOL 10 MG/ML
VIAL (ML) INTRAVENOUS
Status: DISCONTINUED | OUTPATIENT
Start: 2018-06-28 | End: 2018-06-28

## 2018-06-28 RX ORDER — ROCURONIUM BROMIDE 10 MG/ML
INJECTION, SOLUTION INTRAVENOUS
Status: DISCONTINUED | OUTPATIENT
Start: 2018-06-28 | End: 2018-06-28

## 2018-06-28 RX ORDER — ACETAMINOPHEN 10 MG/ML
1000 INJECTION, SOLUTION INTRAVENOUS ONCE
Status: COMPLETED | OUTPATIENT
Start: 2018-06-28 | End: 2018-06-28

## 2018-06-28 RX ORDER — MIDAZOLAM HYDROCHLORIDE 1 MG/ML
0.5 INJECTION INTRAMUSCULAR; INTRAVENOUS
Status: DISCONTINUED | OUTPATIENT
Start: 2018-06-28 | End: 2018-06-28 | Stop reason: HOSPADM

## 2018-06-28 RX ORDER — METHOCARBAMOL 500 MG/1
500 TABLET, FILM COATED ORAL 3 TIMES DAILY
Status: COMPLETED | OUTPATIENT
Start: 2018-06-28 | End: 2018-06-29

## 2018-06-28 RX ORDER — EPHEDRINE SULFATE 50 MG/ML
INJECTION, SOLUTION INTRAVENOUS
Status: DISCONTINUED | OUTPATIENT
Start: 2018-06-28 | End: 2018-06-28

## 2018-06-28 RX ORDER — ACETAMINOPHEN 500 MG
1000 TABLET ORAL EVERY 6 HOURS
Status: DISCONTINUED | OUTPATIENT
Start: 2018-06-28 | End: 2018-06-30 | Stop reason: HOSPADM

## 2018-06-28 RX ORDER — OXYCODONE HYDROCHLORIDE 5 MG/1
10 TABLET ORAL
Status: DISCONTINUED | OUTPATIENT
Start: 2018-06-28 | End: 2018-06-29

## 2018-06-28 RX ORDER — OXYCODONE AND ACETAMINOPHEN 10; 325 MG/1; MG/1
1 TABLET ORAL EVERY 4 HOURS PRN
Qty: 90 TABLET | Refills: 0 | Status: SHIPPED | OUTPATIENT
Start: 2018-06-28 | End: 2018-06-29 | Stop reason: HOSPADM

## 2018-06-28 RX ORDER — NEOSTIGMINE METHYLSULFATE 1 MG/ML
INJECTION, SOLUTION INTRAVENOUS
Status: DISCONTINUED | OUTPATIENT
Start: 2018-06-28 | End: 2018-06-28

## 2018-06-28 RX ORDER — CLINDAMYCIN PHOSPHATE 900 MG/50ML
900 INJECTION, SOLUTION INTRAVENOUS
Status: DISCONTINUED | OUTPATIENT
Start: 2018-06-28 | End: 2018-06-28 | Stop reason: HOSPADM

## 2018-06-28 RX ORDER — KETAMINE HYDROCHLORIDE 50 MG/ML
INJECTION, SOLUTION INTRAMUSCULAR; INTRAVENOUS
Status: DISCONTINUED | OUTPATIENT
Start: 2018-06-28 | End: 2018-06-28

## 2018-06-28 RX ORDER — FENTANYL CITRATE 50 UG/ML
25 INJECTION, SOLUTION INTRAMUSCULAR; INTRAVENOUS EVERY 5 MIN PRN
Status: DISCONTINUED | OUTPATIENT
Start: 2018-06-28 | End: 2018-06-28 | Stop reason: HOSPADM

## 2018-06-28 RX ORDER — HYDROMORPHONE HYDROCHLORIDE 1 MG/ML
0.2 INJECTION, SOLUTION INTRAMUSCULAR; INTRAVENOUS; SUBCUTANEOUS EVERY 5 MIN PRN
Status: CANCELLED | OUTPATIENT
Start: 2018-06-28

## 2018-06-28 RX ORDER — GLYCOPYRROLATE 0.2 MG/ML
INJECTION INTRAMUSCULAR; INTRAVENOUS
Status: DISCONTINUED | OUTPATIENT
Start: 2018-06-28 | End: 2018-06-28

## 2018-06-28 RX ORDER — OXYCODONE HYDROCHLORIDE 5 MG/1
5 TABLET ORAL
Status: DISCONTINUED | OUTPATIENT
Start: 2018-06-28 | End: 2018-06-30 | Stop reason: HOSPADM

## 2018-06-28 RX ORDER — FENTANYL CITRATE 50 UG/ML
INJECTION, SOLUTION INTRAMUSCULAR; INTRAVENOUS
Status: DISCONTINUED | OUTPATIENT
Start: 2018-06-28 | End: 2018-06-28

## 2018-06-28 RX ORDER — CEFAZOLIN SODIUM 1 G/3ML
2 INJECTION, POWDER, FOR SOLUTION INTRAMUSCULAR; INTRAVENOUS ONCE
Status: COMPLETED | OUTPATIENT
Start: 2018-06-28 | End: 2018-06-28

## 2018-06-28 RX ORDER — SODIUM CHLORIDE 9 MG/ML
INJECTION, SOLUTION INTRAVENOUS CONTINUOUS PRN
Status: DISCONTINUED | OUTPATIENT
Start: 2018-06-28 | End: 2018-06-28

## 2018-06-28 RX ORDER — HYDROXYZINE HYDROCHLORIDE 25 MG/1
25 TABLET, FILM COATED ORAL 4 TIMES DAILY PRN
Status: DISCONTINUED | OUTPATIENT
Start: 2018-06-28 | End: 2018-06-30 | Stop reason: HOSPADM

## 2018-06-28 RX ORDER — PHENYLEPHRINE HYDROCHLORIDE 10 MG/ML
INJECTION INTRAVENOUS
Status: DISCONTINUED | OUTPATIENT
Start: 2018-06-28 | End: 2018-06-28

## 2018-06-28 RX ORDER — BUPIVACAINE HYDROCHLORIDE 2.5 MG/ML
INJECTION, SOLUTION EPIDURAL; INFILTRATION; INTRACAUDAL
Status: DISCONTINUED | OUTPATIENT
Start: 2018-06-28 | End: 2018-06-28

## 2018-06-28 RX ORDER — ROPIVACAINE HYDROCHLORIDE 2 MG/ML
6 INJECTION, SOLUTION EPIDURAL; INFILTRATION; PERINEURAL CONTINUOUS
Status: DISCONTINUED | OUTPATIENT
Start: 2018-06-28 | End: 2018-06-30 | Stop reason: HOSPADM

## 2018-06-28 RX ORDER — MORPHINE SULFATE 2 MG/ML
2 INJECTION, SOLUTION INTRAMUSCULAR; INTRAVENOUS
Status: DISCONTINUED | OUTPATIENT
Start: 2018-06-28 | End: 2018-06-30 | Stop reason: HOSPADM

## 2018-06-28 RX ORDER — CELECOXIB 200 MG/1
400 CAPSULE ORAL ONCE
Status: COMPLETED | OUTPATIENT
Start: 2018-06-28 | End: 2018-06-28

## 2018-06-28 RX ORDER — ROPIVACAINE HYDROCHLORIDE 2 MG/ML
INJECTION, SOLUTION EPIDURAL; INFILTRATION; PERINEURAL
Status: COMPLETED
Start: 2018-06-28 | End: 2018-06-28

## 2018-06-28 RX ORDER — CELECOXIB 200 MG/1
200 CAPSULE ORAL DAILY
Status: DISCONTINUED | OUTPATIENT
Start: 2018-06-29 | End: 2018-06-30 | Stop reason: HOSPADM

## 2018-06-28 RX ORDER — VANCOMYCIN HYDROCHLORIDE 1 G/20ML
INJECTION, POWDER, LYOPHILIZED, FOR SOLUTION INTRAVENOUS
Status: DISCONTINUED | OUTPATIENT
Start: 2018-06-28 | End: 2018-06-28 | Stop reason: HOSPADM

## 2018-06-28 RX ORDER — CLINDAMYCIN PHOSPHATE 900 MG/50ML
900 INJECTION, SOLUTION INTRAVENOUS ONCE
Status: COMPLETED | OUTPATIENT
Start: 2018-06-28 | End: 2018-06-28

## 2018-06-28 RX ADMIN — OXYCODONE HYDROCHLORIDE AND ACETAMINOPHEN 1 TABLET: 5; 325 TABLET ORAL at 12:06

## 2018-06-28 RX ADMIN — FENTANYL CITRATE 100 MCG: 50 INJECTION, SOLUTION INTRAMUSCULAR; INTRAVENOUS at 09:06

## 2018-06-28 RX ADMIN — PHENYLEPHRINE HYDROCHLORIDE 100 MCG: 10 INJECTION INTRAVENOUS at 12:06

## 2018-06-28 RX ADMIN — PHENYLEPHRINE HYDROCHLORIDE 100 MCG: 10 INJECTION INTRAVENOUS at 10:06

## 2018-06-28 RX ADMIN — NEOSTIGMINE METHYLSULFATE 5 MG: 1 INJECTION INTRAVENOUS at 02:06

## 2018-06-28 RX ADMIN — PHENYLEPHRINE HYDROCHLORIDE 150 MCG: 10 INJECTION INTRAVENOUS at 11:06

## 2018-06-28 RX ADMIN — PHENYLEPHRINE HYDROCHLORIDE 200 MCG: 10 INJECTION INTRAVENOUS at 10:06

## 2018-06-28 RX ADMIN — EPHEDRINE SULFATE 10 MG: 50 INJECTION, SOLUTION INTRAMUSCULAR; INTRAVENOUS; SUBCUTANEOUS at 01:06

## 2018-06-28 RX ADMIN — FENTANYL CITRATE 50 MCG: 50 INJECTION INTRAMUSCULAR; INTRAVENOUS at 08:06

## 2018-06-28 RX ADMIN — EPHEDRINE SULFATE 10 MG: 50 INJECTION, SOLUTION INTRAMUSCULAR; INTRAVENOUS; SUBCUTANEOUS at 10:06

## 2018-06-28 RX ADMIN — FENTANYL CITRATE 50 MCG: 50 INJECTION, SOLUTION INTRAMUSCULAR; INTRAVENOUS at 02:06

## 2018-06-28 RX ADMIN — ROCURONIUM BROMIDE 20 MG: 10 INJECTION, SOLUTION INTRAVENOUS at 11:06

## 2018-06-28 RX ADMIN — OXYCODONE HYDROCHLORIDE 10 MG: 5 TABLET ORAL at 05:06

## 2018-06-28 RX ADMIN — PREGABALIN 150 MG: 150 CAPSULE ORAL at 10:06

## 2018-06-28 RX ADMIN — SODIUM CHLORIDE, SODIUM GLUCONATE, SODIUM ACETATE, POTASSIUM CHLORIDE, MAGNESIUM CHLORIDE, SODIUM PHOSPHATE, DIBASIC, AND POTASSIUM PHOSPHATE: .53; .5; .37; .037; .03; .012; .00082 INJECTION, SOLUTION INTRAVENOUS at 11:06

## 2018-06-28 RX ADMIN — SODIUM CHLORIDE: 0.9 INJECTION, SOLUTION INTRAVENOUS at 09:06

## 2018-06-28 RX ADMIN — SODIUM CHLORIDE, SODIUM GLUCONATE, SODIUM ACETATE, POTASSIUM CHLORIDE, MAGNESIUM CHLORIDE, SODIUM PHOSPHATE, DIBASIC, AND POTASSIUM PHOSPHATE: .53; .5; .37; .037; .03; .012; .00082 INJECTION, SOLUTION INTRAVENOUS at 10:06

## 2018-06-28 RX ADMIN — EPHEDRINE SULFATE 10 MG: 50 INJECTION, SOLUTION INTRAMUSCULAR; INTRAVENOUS; SUBCUTANEOUS at 12:06

## 2018-06-28 RX ADMIN — LIDOCAINE HYDROCHLORIDE 60 MG: 20 INJECTION, SOLUTION INTRAVENOUS at 09:06

## 2018-06-28 RX ADMIN — ROPIVACAINE HYDROCHLORIDE 6 ML/HR: 2 INJECTION, SOLUTION EPIDURAL; INFILTRATION at 03:06

## 2018-06-28 RX ADMIN — PROPOFOL 100 MG: 10 INJECTION, EMULSION INTRAVENOUS at 09:06

## 2018-06-28 RX ADMIN — FENTANYL CITRATE 25 MCG: 50 INJECTION INTRAMUSCULAR; INTRAVENOUS at 03:06

## 2018-06-28 RX ADMIN — PHENYLEPHRINE HYDROCHLORIDE 100 MCG: 10 INJECTION INTRAVENOUS at 11:06

## 2018-06-28 RX ADMIN — SODIUM CHLORIDE: 0.9 INJECTION, SOLUTION INTRAVENOUS at 11:06

## 2018-06-28 RX ADMIN — ONDANSETRON 4 MG: 2 INJECTION INTRAMUSCULAR; INTRAVENOUS at 01:06

## 2018-06-28 RX ADMIN — HYDRALAZINE HYDROCHLORIDE 25 MG: 25 TABLET, FILM COATED ORAL at 05:06

## 2018-06-28 RX ADMIN — ACETAMINOPHEN 1000 MG: 10 INJECTION, SOLUTION INTRAVENOUS at 10:06

## 2018-06-28 RX ADMIN — MIDAZOLAM HYDROCHLORIDE 2 MG: 1 INJECTION, SOLUTION INTRAMUSCULAR; INTRAVENOUS at 08:06

## 2018-06-28 RX ADMIN — EPHEDRINE SULFATE 10 MG: 50 INJECTION, SOLUTION INTRAMUSCULAR; INTRAVENOUS; SUBCUTANEOUS at 11:06

## 2018-06-28 RX ADMIN — CELECOXIB 400 MG: 200 CAPSULE ORAL at 10:06

## 2018-06-28 RX ADMIN — HYDRALAZINE HYDROCHLORIDE 25 MG: 25 TABLET, FILM COATED ORAL at 10:06

## 2018-06-28 RX ADMIN — CLINDAMYCIN PHOSPHATE 900 MG: 18 INJECTION, SOLUTION INTRAVENOUS at 10:06

## 2018-06-28 RX ADMIN — ROCURONIUM BROMIDE 50 MG: 10 INJECTION, SOLUTION INTRAVENOUS at 09:06

## 2018-06-28 RX ADMIN — ACETAMINOPHEN 1000 MG: 500 TABLET, COATED ORAL at 05:06

## 2018-06-28 RX ADMIN — KETAMINE HYDROCHLORIDE 50 MG: 50 INJECTION INTRAMUSCULAR; INTRAVENOUS at 09:06

## 2018-06-28 RX ADMIN — BUPIVACAINE HYDROCHLORIDE 20 ML: 2.5 INJECTION, SOLUTION EPIDURAL; INFILTRATION; INTRACAUDAL; PERINEURAL at 03:06

## 2018-06-28 RX ADMIN — METHOCARBAMOL 500 MG: 500 TABLET ORAL at 10:06

## 2018-06-28 RX ADMIN — METHOCARBAMOL 500 MG: 500 TABLET ORAL at 03:06

## 2018-06-28 RX ADMIN — FENTANYL CITRATE 25 MCG: 50 INJECTION INTRAMUSCULAR; INTRAVENOUS at 02:06

## 2018-06-28 RX ADMIN — GLYCOPYRROLATE 0.8 MG: 0.2 INJECTION, SOLUTION INTRAMUSCULAR; INTRAVENOUS at 02:06

## 2018-06-28 RX ADMIN — CEFAZOLIN 2 G: 330 INJECTION, POWDER, FOR SOLUTION INTRAMUSCULAR; INTRAVENOUS at 10:06

## 2018-06-28 NOTE — ANESTHESIA POSTPROCEDURE EVALUATION
"Anesthesia Post Evaluation    Patient: Shannon Coleman    Procedure(s) Performed: Procedure(s) (LRB):  ORIF, HUMERUS, PROXIMAL- synthes- left- c arm (Left)  ORIF, FRACTURE, HUMERUS - shaft (Left)    Final Anesthesia Type: general  Patient location during evaluation: PACU  Level of consciousness: awake and alert  Post-procedure vital signs: reviewed and stable  Pain management: adequate  Airway patency: patent  PONV status at discharge: No PONV  Anesthetic complications: no      Cardiovascular status: blood pressure returned to baseline  Respiratory status: unassisted and spontaneous ventilation  Hydration status: euvolemic  Follow-up not needed.        Visit Vitals  /74   Pulse 84   Temp 36.9 °C (98.4 °F) (Temporal)   Resp 19   Ht 5' 7" (1.702 m)   Wt 105.7 kg (233 lb)   SpO2 97%   Breastfeeding? No   BMI 36.49 kg/m²       Pain/Becca Score: Pain Assessment Performed: Yes (6/28/2018  4:00 PM)  Presence of Pain: complains of pain/discomfort (6/28/2018  4:00 PM)  Pain Rating Prior to Med Admin: 10 (6/28/2018  3:21 PM)  Pain Rating Post Med Admin: 0 (6/28/2018  1:13 AM)  Becca Score: 9 (6/28/2018  4:00 PM)      "

## 2018-06-28 NOTE — PROGRESS NOTES
[facility]  Orthopedics  Progress Note    Attg Note:  Patient seen and examined.  I agree with the above assessment and plan.   To OR for ORIF left proximal humerus and shaft.  The risks, benefits and alternatives to surgery were discussed with the patient at great length.  These include bleeding, infection, vessel/nerve damage, pain, numbness, tingling, complex regional pain syndrome, hardware/surgical failure, need for further surgery, stiffness, AVN, fixation failure,  malunion, nonunion, DVT, PE, arthritis and death.  Patient states an understanding and wishes to proceed with surgery.   All questions were answered.  No guarantees were implied or stated.  Informed consent was obtained.      Quinn Parker MD      Patient Name: Shannon Coleman  MRN: [id number]  Admission Date: 6/24/2018  Hospital Length of Stay: 4 days  Attending Provider: Clif Page MD  Primary Care Provider: Ramon Box MD  Follow-up For: Procedure(s) (LRB):  ORIF, HUMERUS, PROXIMAL- synthes- left- c arm (Left)    Post-Operative Day:    Subjective:     Principal Problem:Syncope    Principal Orthopedic Problem: left proximal humerus fracture    Interval History: Patient seen and examined at bedside.  No acute events overnight.  Pain controlled. Surgery delayed to today. Plan for ORIF today. NPO now.    Review of patient's allergies indicates:  No Known Allergies    Current Facility-Administered Medications   Medication    amLODIPine tablet 10 mg    citalopram tablet 40 mg    fenofibrate tablet 160 mg    hydrALAZINE tablet 25 mg    losartan tablet 25 mg    metoprolol succinate (TOPROL-XL) 24 hr tablet 25 mg    oxyCODONE-acetaminophen 5-325 mg per tablet 1 tablet     Objective:     Vital Signs (Most Recent):  Temp: 98 °F (36.7 °C) (06/28/18 0530)  Pulse: 72 (06/28/18 0530)  Resp: 16 (06/28/18 0530)  BP: 127/72 (06/28/18 0530)  SpO2: (!) 94 % (06/28/18 0530) Vital Signs (24h Range):  Temp:  [97.7 °F (36.5 °C)-98 °F  "(36.7 °C)] 98 °F (36.7 °C)  Pulse:  [67-99] 72  Resp:  [12-19] 16  SpO2:  [92 %-95 %] 94 %  BP: (127-182)/(72-91) 127/72     Weight: 105.7 kg (233 lb 0.4 oz)  Height: 5' 7" (170.2 cm)  Body mass index is 36.5 kg/m².      Intake/Output Summary (Last 24 hours) at 06/28/18 0536  Last data filed at 06/27/18 2300   Gross per 24 hour   Intake              300 ml   Output                0 ml   Net              300 ml       Ortho/SPM Exam    AAOx4  NAD  RRR  No increased WOB    LUE:  Shoulder immobilizer in place  AIN, PIN, M, R, U intact    Significant Labs:   CBC:     Recent Labs  Lab 06/26/18  0739 06/27/18  0629 06/28/18  0344   WBC 9.75 8.83 9.31   HGB 8.8* 8.8* 8.1*   HCT 28.2* 29.0* 26.6*    377* 400*     CMP:     Recent Labs  Lab 06/26/18  1259 06/26/18  1729 06/27/18  0629   * 134* 137   K 3.9 4.0 4.0    99 102   CO2 24 22* 24   GLU 84 192* 110   BUN 12 14 12   CREATININE 0.6 0.8 0.6   CALCIUM 9.1 9.1 9.2   ANIONGAP 10 13 11   EGFRNONAA >60.0 >60.0 >60.0     All pertinent labs within the past 24 hours have been reviewed.    Significant Imaging: None    Assessment/Plan:     Closed fracture of proximal end of left humerus    Shannon Coleman is a 57 y.o. female with a left proximal humerus fracture    - Plan for ORIF on today  - Discussed case with primary. Low risk for perioperative cardiac issues  - Primary suspects hyponatremia from combination of alcohol intake and HCTZ  - DVT ppx: held yesterday  - NPO now  - Appreciate assistance by medicine              Christian Henderson MD  Orthopedics  [facility]  "

## 2018-06-28 NOTE — PROGRESS NOTES
Anesthesia resident advised pt that she needed to put o2 on   Pt refused and said maybe when she is asleep,  Suggested face mask for blow by

## 2018-06-28 NOTE — NURSING TRANSFER
Nursing Transfer Note      6/28/2018     Transfer To: [address]A    Transfer via bed    Transfer with cardiac monitoring verified with telemetry technician  Transported by PCT    Medicines sent: None    Chart send with patient: Yes    Notified: spouse, daughter    Patient reassessed at: 6/28/18 1600    Upon arrival to floor: cardiac monitor applied, patient oriented to room, call bell in reach and bed in lowest position

## 2018-06-28 NOTE — ASSESSMENT & PLAN NOTE
Orthopedics following.   - pain control with perocet 5-325   -CT scan shows a c omminuted fracture of the surgical neck of the left humerus with retraction and mild anteromedial displacement of the major fracture fragment.  - Ortho to perform ORIF 6/28  - RCRI score of 0-1 which corresponds to up to a 1% chance of perioperative cardiac events. No further testing needed prior to surgery.   - Pending pain control procedure, possible D/C home, pt is anxious to go home with family

## 2018-06-28 NOTE — SUBJECTIVE & OBJECTIVE
Interval History: No acute events overnight.  Pain controlled. Surgery delayed to today. Plan for ORIF today    Review of Systems   Constitutional: Positive for fatigue. Negative for activity change, appetite change, chills, fever and unexpected weight change.   HENT: Negative for rhinorrhea and sore throat.    Eyes: Negative for pain and visual disturbance.   Respiratory: Negative for cough and shortness of breath.    Cardiovascular: Negative for chest pain and palpitations.   Gastrointestinal: Negative for abdominal pain, diarrhea and nausea.   Genitourinary: Negative for dysuria, hematuria and urgency.   Musculoskeletal: Negative for gait problem and neck stiffness.   Skin: Negative for rash.   Neurological: Positive for syncope. Negative for light-headedness and headaches.     Objective:     Vital Signs (Most Recent):  Temp: 98.2 °F (36.8 °C) (06/28/18 0733)  Pulse: 74 (06/28/18 0733)  Resp: 18 (06/28/18 0733)  BP: (!) 144/74 (06/28/18 0733)  SpO2: 95 % (06/28/18 0733) Vital Signs (24h Range):  Temp:  [97.9 °F (36.6 °C)-98.2 °F (36.8 °C)] 98.2 °F (36.8 °C)  Pulse:  [67-99] 74  Resp:  [12-19] 18  SpO2:  [92 %-95 %] 95 %  BP: (127-161)/(72-76) 144/74     Weight: 105.7 kg (233 lb 0.4 oz)  Body mass index is 36.5 kg/m².    Intake/Output Summary (Last 24 hours) at 06/28/18 0752  Last data filed at 06/28/18 0600   Gross per 24 hour   Intake              300 ml   Output                2 ml   Net              298 ml      Physical Exam   Constitutional: She is oriented to person, place, and time. She appears well-developed and well-nourished.   Cardiovascular: Normal rate, regular rhythm and normal heart sounds.  Exam reveals no gallop and no friction rub.    No murmur heard.  Pulmonary/Chest: Effort normal and breath sounds normal. No respiratory distress. She has no wheezes. She has no rales.   Abdominal: Soft. Bowel sounds are normal. She exhibits no distension. There is no tenderness.   Musculoskeletal:   Left arm in  sling    Neurological: She is alert and oriented to person, place, and time. She displays normal reflexes. No cranial nerve deficit. Coordination normal.   Skin: Skin is warm and dry.       Computed MELD-Na score unavailable. Necessary lab results were not found in the last year.  Computed MELD score unavailable. Necessary lab results were not found in the last year.    Significant Labs:  CBC:    Recent Labs  Lab 06/27/18  0629 06/28/18  0344   WBC 8.83 9.31   HGB 8.8* 8.1*   HCT 29.0* 26.6*   * 400*     CMP:    Recent Labs  Lab 06/26/18  1729 06/27/18  0629 06/28/18  0344   * 137 139   K 4.0 4.0 3.5   CL 99 102 103   CO2 22* 24 24   * 110 105   BUN 14 12 13   CREATININE 0.8 0.6 0.6   CALCIUM 9.1 9.2 9.1   ANIONGAP 13 11 12   EGFRNONAA >60.0 >60.0 >60.0     PTINR:  No results for input(s): INR in the last 48 hours.    Significant Procedures:   Dobutamine Stress Test with Color Flow: No results found for this or any previous visit.    2D Echo: No results found for this or any previous visit.     CT scan 6/24: Comminuted fracture of the surgical neck of the left humerus with retraction and mild anteromedial displacement of the major fracture fragment.

## 2018-06-28 NOTE — ANESTHESIA PROCEDURE NOTES
Interscalene Brachial Plexus Catheter    Patient location during procedure: pre-op   Block not for primary anesthetic.  Reason for block: at surgeon's request and post-op pain management   Post-op Pain Location: left shoulder   Start time: 6/28/2018 8:52 AM  Timeout: 6/28/2018 8:49 AM   End time: 6/28/2018 9:07 AM  Staffing  Anesthesiologist: GRACIE GUZMAN  Resident/CRNA: STEPHANIE HALLMAN  Performed: resident/CRNA   Preanesthetic Checklist  Completed: patient identified, site marked, surgical consent, pre-op evaluation, timeout performed, IV checked, risks and benefits discussed and monitors and equipment checked  Peripheral Block  Patient position: sitting  Prep: ChloraPrep and site prepped and draped  Patient monitoring: heart rate, cardiac monitor, continuous pulse ox, continuous capnometry and frequent blood pressure checks  Block type: interscalene  Laterality: left  Injection technique: continuous  Needle  Needle type: Tuohy   Needle gauge: 18 G  Needle length: 2 in  Needle localization: anatomical landmarks and ultrasound guidance  Catheter type: non-stimulating  Catheter size: 20 G  Test dose: lidocaine 1.5% with Epi 1-to-200,000 and negative   -ultrasound image captured on disc.  Assessment  Injection assessment: negative aspiration, negative parasthesia and local visualized surrounding nerve  Paresthesia pain: none  Heart rate change: no  Slow fractionated injection: yes  Additional Notes  VSS.  [facility] RN monitoring vitals throughout procedure.  Patient tolerated procedure well.  Only test dose given per Elena.

## 2018-06-28 NOTE — NURSING TRANSFER
Nursing Transfer Note      6/28/2018     Transfer from 393B to Surgery    Transfer via bed    Transfer with telemetry    Transported by transport    Medicines sent: n/a    Chart send with patient: yes    Notified: daughter

## 2018-06-28 NOTE — TRANSFER OF CARE
"Anesthesia Transfer of Care Note    Patient: Shannon Coleman    Procedure(s) Performed: Procedure(s) (LRB):  ORIF, HUMERUS, PROXIMAL- synthes- left- c arm (Left)  ORIF, FRACTURE, HUMERUS - shaft (Left)    Patient location: PACU    Anesthesia Type: general    Transport from OR: Transported from OR on 6-10 L/min O2 by face mask with adequate spontaneous ventilation    Post pain: adequate analgesia    Post assessment: no apparent anesthetic complications and tolerated procedure well    Post vital signs: stable    Level of consciousness: awake, alert and oriented    Nausea/Vomiting: no nausea/vomiting    Complications: none    Transfer of care protocol was followed      Last vitals:   Visit Vitals  /61 (BP Location: Right arm, Patient Position: Lying)   Pulse 100   Temp 37.4 °C (99.3 °F) (Temporal)   Resp 16   Ht 5' 7" (1.702 m)   Wt 105.7 kg (233 lb)   SpO2 95%   Breastfeeding? No   BMI 36.49 kg/m²     "

## 2018-06-28 NOTE — BRIEF OP NOTE
BRIEF OP NOTE    Preop Dx: Left proximal humerus fracture with extension to mid humeral shaft    Postop Dx: Left proximal humerus fracture with extension to mid humeral shaft    Procedure: Open treatment of left proixmal humerus fracture and left humeral shaft fracture with internal fixation    Surgeon: Quinn Parker M.D.    Asst:  Christian Gunter M.D    Anesthesia: GETA    EBL:  250cc    IVF:  2100cc crystalloid    Implants: Synthes 8 hole philos humerus plate    Specimens: None    Findings: Very high lateral fracture.  Split to midshaft.  Good fixation.    Dispo:  To PACU extubated/stable     Dict#  [id number]

## 2018-06-28 NOTE — OP NOTE
DATE OF PROCEDURE:  06/28/2018.    PREOPERATIVE DIAGNOSIS:  Left proximal humerus fracture with extension to mid   humeral shaft.    POSTOPERATIVE DIAGNOSIS:  Left proximal humerus fracture with extension to mid   humeral shaft.    PROCEDURE PERFORMED:  Open treatment of left proximal humerus fracture and left   humeral shaft fracture with internal fixation.    SURGEON:  Quinn Parker M.D.    ASSISTANT:  Christian Henderson M.D. (RES).    ANESTHESIA:  General endotracheal.    ESTIMATED BLOOD LOSS:  250 mL.    INTRAVENOUS FLUIDS:  2100 mL crystalloid.    IMPLANTS:  Synthes 8-hole Philos humerus plate and interfragmentary screws.    INDICATIONS FOR PROCEDURE:  The patient is a 57-year-old female who passed out   in her bathroom, fell, and had a fracture of the proximal humerus as well as   extending down to just above the mid humeral shaft.  The patient was initially   worked up for a syncopal episode and found to be hyponatremic and dehydrated.    She has been hydrated since that point in time and had an echocardiogram and EEG   and is now ready for definitive fixation of her fracture.  The risks, benefits,   and alternatives to surgery were discussed at length with the patient prior to   going to the Operating Room.  Informed consent was obtained.    I am appending a 22 modifier to this case given the complexity of the fracture   and extra time and energy in its fixation.    PROCEDURE IN DETAIL:  The patient was identified in the preoperative holding   area and the site was marked.  Regional analgesia was performed in the form of a   catheter.  The patient was then wheeled in the Operating Room and placed on the   operating table in supine position.  General endotracheal anesthesia was   induced.  Preoperative antibiotics were administered.  The patient was then   moved over to the edge of the table and placed into a modified beach chair   position at about 30 degrees.  The left shoulder and upper extremity were    prepped and draped in a sterile fashion.  A timeout was undertaken to confirm   patient, site, surgery, surgeon, and administration of preoperative antibiotics.    All agreed and we proceeded.    I made a deltopectoral approach extending down into a long anterolateral   approach to the arm.  I incised the skin and subcutaneous tissues.  I developed   the plane between the deltoid and the pectoralis after identifying the vein,   worked my way down, and incised the fascia over the biceps and brachialis.  I   worked my way bluntly around the proximal humeral shaft and head and placed a   King retractor around laterally around the head putting a little tension on   the deltoid insertion.  The deltoid insertion was split by the fracture   extending down and I had to elevate this off of the proximal piece and then   elevate this off about the proximal third of the distal shaft fragment.  I got   up into the long anterior free fragment, elevating a little bit of biceps up off   of this.  I then split the brachioradialis in its lateral two-thirds along the   distal shaft for several centimeters and then I bluntly dissected distally in   order to protect the radial nerve moving toward the elbow.    At this point, I removed much of hematoma from the area of the main shaft   component and the large anterior shaft split and worked my way around   posteriorly, identifying the radial nerve.  I placed a series of several clamps   on the major fracture fragments after dissecting them out.  I placed a single   2.7 mm position screw and then placed a 2 mm drill bit to hold rotation more   proximally.  I then turned my attention to the head.  The head had a calcar   fragment and had posterior comminution at the area just outside the footprint   for the rotator cuff.  I placed two #2 Ethibond sutures in Lazarus-Isaias fashion   into the supraspinatus tendon, which was intact.  The fracture from the shaft   actually went high just  under the footprint for the rotator cuff and I had to   maneuver and lever the head back up on its comminution on to the shaft that I   had now recreated.    At this point, I took an 8-hole Synthes Philos proximal humerus plate, pulled   the sutures through the two suture holes in the plate, and pulled it down to   bone.  I aligned this with the shaft distally, pulled traction, placed a K-wire   into the head in order to hold position and height here, and I had to go quite   high just short of what would be impingement in order to gain a good position   and anytime lateral buttress on the very proximal fracture and then I placed a   whirlybird or push-pull device into the shaft of the bone in its mid shaft.  I   checked this under fluoroscopy and had good alignment and good plate placement.    I then placed another wire through a locking tower.  I then placed a single   cancellous screw into the head pulling the plate down gaining compression   against the head.  I then placed locking screws in the head around this in order   to hold position.  I then got one of the calcar screws in good position and a   total of I think 8 screws in total and then removed the initial cancellous screw   and replaced this also with a locking screw.  There was still a good deal of   comminution, but not enough room for calcium phosphate or fibular strut.  I   placed a cortical screw x2 distal to the push-pull device and I removed the   push-pull device and placed a cortical screw where this had been.  I then placed   another two cortical screws traversing the long oblique fracture for a total of   three screws along the shaft component of the fracture.    After this, I visualized everything under fluoroscopy and I had good screw   placement, going very long in the head to get into good bone, but using approach   withdrawal stopping short of the joint.  I then took the rotator cuff sutures   and tied those over a bridge on the plate.   The entire construct was then   visualized with good plate placement of the shaft and good plate placement of   the head.    At this point, I copiously irrigated everything with normal saline solution.  I   placed 2 g of vancomycin powder deep in the wound.  I closed the deep layer of   fascia with 0 Vicryl suture, next layer of fascia with 0 Vicryl suture, the   subcutaneous tissue with inverted 3-0 Vicryl suture, and the skin with 3-0 nylon   suture.  An Aquacel dressing was placed, followed by a sling.    All instrument and sponge counts were reported correct at the end of the case.    There were no complications.  The patient was extubated, awakened, and taken to   Recovery in stable condition.    PLAN FOR THE PATIENT:  She has very poor bone stock in the head and a very   proximal fracture, so I will protect her and go slow with the motion.  We will   begin with some pendulums and some gentle passive range of motion immediately   and then we will begin some active assist after the two-week merlene and work our   way up to active range of motion after four weeks.      JOSE  dd: 06/28/2018 14:43:13 (CDT)  td: 06/28/2018 17:48:47 (CDT)  Doc ID   #[id number]  Job ID #[id number]    CC:

## 2018-06-28 NOTE — PROGRESS NOTES
"[facility] Hospital Medicine  Progress Note    Patient Name: Shannon Coleman  MRN: [id number]  Patient Class: IP- Inpatient   Admission Date: 6/24/2018  Length of Stay: 4 days  Attending Physician: Clif Page MD  Primary Care Provider: Ramon Box MD    [facility] Medicine Team: [facility]OSP MED A Clif Page MD    Subjective:     Principal Problem:Syncope    HPI:  57 year old woman with htn on HCTZ who presents after a syncopal episode. Pt states she felt well and was in her normal state of health throughout the day yesterday. She was sitting in her chair outside with her  when she got up to go to the bathroom for a bowel movement. She states she walked into the bathroom and suddenly felt light headed and dizzy then passed out. When she awoke, she denies any confusion but states she had soiled herself. Her  found her and helped her up. She states she continued to feel weak and "drained" but was able to  shower with assistance from her  and go to bed. She reports since the fall her left arm hurt and increasingly became worse thus she went to the ED for evaluation. She does report normal PO intake but reports she did have 4 beers earlier in the afternoon which did cause increased urinary frequency.  She denies any chest pain or palpitations prior to syncopal episode. She also denies any sob, headache, focal deficits, paraesthesias, or tongue biting.     Hospital Course:  Admitted to hospital medicine for hyponatremia and syncope. Also noted to have a left proximal humerus fracture. Orthopedics consulted and following. Na improved with IVF. Echo done for syncope workup w/o AS or WMA. Normal EF. Plan for ORIF on 6/27 per ortho.     Interval History: No acute events overnight.  Pain controlled. Surgery delayed to today. Plan for ORIF today    Review of Systems   Constitutional: Positive for fatigue. Negative for activity change, appetite change, chills, fever and unexpected " weight change.   HENT: Negative for rhinorrhea and sore throat.    Eyes: Negative for pain and visual disturbance.   Respiratory: Negative for cough and shortness of breath.    Cardiovascular: Negative for chest pain and palpitations.   Gastrointestinal: Negative for abdominal pain, diarrhea and nausea.   Genitourinary: Negative for dysuria, hematuria and urgency.   Musculoskeletal: Negative for gait problem and neck stiffness.   Skin: Negative for rash.   Neurological: Positive for syncope. Negative for light-headedness and headaches.     Objective:     Vital Signs (Most Recent):  Temp: 98.2 °F (36.8 °C) (06/28/18 0733)  Pulse: 74 (06/28/18 0733)  Resp: 18 (06/28/18 0733)  BP: (!) 144/74 (06/28/18 0733)  SpO2: 95 % (06/28/18 0733) Vital Signs (24h Range):  Temp:  [97.9 °F (36.6 °C)-98.2 °F (36.8 °C)] 98.2 °F (36.8 °C)  Pulse:  [67-99] 74  Resp:  [12-19] 18  SpO2:  [92 %-95 %] 95 %  BP: (127-161)/(72-76) 144/74     Weight: 105.7 kg (233 lb 0.4 oz)  Body mass index is 36.5 kg/m².    Intake/Output Summary (Last 24 hours) at 06/28/18 0752  Last data filed at 06/28/18 0600   Gross per 24 hour   Intake              300 ml   Output                2 ml   Net              298 ml      Physical Exam   Constitutional: She is oriented to person, place, and time. She appears well-developed and well-nourished.   Cardiovascular: Normal rate, regular rhythm and normal heart sounds.  Exam reveals no gallop and no friction rub.    No murmur heard.  Pulmonary/Chest: Effort normal and breath sounds normal. No respiratory distress. She has no wheezes. She has no rales.   Abdominal: Soft. Bowel sounds are normal. She exhibits no distension. There is no tenderness.   Musculoskeletal:   Left arm in sling    Neurological: She is alert and oriented to person, place, and time. She displays normal reflexes. No cranial nerve deficit. Coordination normal.   Skin: Skin is warm and dry.       Computed MELD-Na score unavailable. Necessary lab  results were not found in the last year.  Computed MELD score unavailable. Necessary lab results were not found in the last year.    Significant Labs:  CBC:    Recent Labs  Lab 06/27/18  0629 06/28/18  0344   WBC 8.83 9.31   HGB 8.8* 8.1*   HCT 29.0* 26.6*   * 400*     CMP:    Recent Labs  Lab 06/26/18  1729 06/27/18  0629 06/28/18  0344   * 137 139   K 4.0 4.0 3.5   CL 99 102 103   CO2 22* 24 24   * 110 105   BUN 14 12 13   CREATININE 0.8 0.6 0.6   CALCIUM 9.1 9.2 9.1   ANIONGAP 13 11 12   EGFRNONAA >60.0 >60.0 >60.0     PTINR:  No results for input(s): INR in the last 48 hours.    Significant Procedures:   Dobutamine Stress Test with Color Flow: No results found for this or any previous visit.    2D Echo: No results found for this or any previous visit.     CT scan 6/24: Comminuted fracture of the surgical neck of the left humerus with retraction and mild anteromedial displacement of the major fracture fragment.    Assessment/Plan:      * Syncope    Pt with a syncopal episode - likely related to orthostasis given labs hypovolemic hyponatremia   - Echo w aortic sclerosis w/o stenosis. No WMA. Normal EF.   - monitor on telemetry for arrhythmia - no events so far   - ddx includes seizure given BM; spot EEG done, neg so far  - stable          Essential hypertension, benign    - restarted amlodipine, holding HYZAAR due to concern for electrolytes issue/dehydration for now  - hydralazine TID started          Hyponatremia    Likely hypovolemic hyponatremia 2/2 to diuretics - hctz. Also increased urination with etoh.   On admit. Improved with IVF  - stable now    - no further work up now  - monitor with daily renal panel            Closed fracture of proximal end of left humerus    Orthopedics following.   - pain control with perocet 5-325   -CT scan shows a c omminuted fracture of the surgical neck of the left humerus with retraction and mild anteromedial displacement of the major fracture  fragment.  - Ortho to perform ORIF 6/28  - RCRI score of 0-1 which corresponds to up to a 1% chance of perioperative cardiac events. No further testing needed prior to surgery.   - Pending pain control procedure, possible D/C home, pt is anxious to go home with family         Depression    Stable   Cont home citalopram           VTE Risk Mitigation         Ordered     Place KALIN hose  Until discontinued      06/25/18 1538     Place sequential compression device  Until discontinued      06/25/18 1538     IP VTE HIGH RISK PATIENT  Once      06/24/18 0504     Place sequential compression device  Until discontinued      06/24/18 0504              Clif Page MD  Department of Hospital Medicine   [facility]

## 2018-06-28 NOTE — SUBJECTIVE & OBJECTIVE
"Principal Problem:Syncope    Principal Orthopedic Problem: left proximal humerus fracture    Interval History: Patient seen and examined at bedside.  No acute events overnight.  Pain controlled. Surgery delayed to today. Plan for ORIF today. NPO now.    Review of patient's allergies indicates:  No Known Allergies    Current Facility-Administered Medications   Medication    amLODIPine tablet 10 mg    citalopram tablet 40 mg    fenofibrate tablet 160 mg    hydrALAZINE tablet 25 mg    losartan tablet 25 mg    metoprolol succinate (TOPROL-XL) 24 hr tablet 25 mg    oxyCODONE-acetaminophen 5-325 mg per tablet 1 tablet     Objective:     Vital Signs (Most Recent):  Temp: 98 °F (36.7 °C) (06/28/18 0530)  Pulse: 72 (06/28/18 0530)  Resp: 16 (06/28/18 0530)  BP: 127/72 (06/28/18 0530)  SpO2: (!) 94 % (06/28/18 0530) Vital Signs (24h Range):  Temp:  [97.7 °F (36.5 °C)-98 °F (36.7 °C)] 98 °F (36.7 °C)  Pulse:  [67-99] 72  Resp:  [12-19] 16  SpO2:  [92 %-95 %] 94 %  BP: (127-182)/(72-91) 127/72     Weight: 105.7 kg (233 lb 0.4 oz)  Height: 5' 7" (170.2 cm)  Body mass index is 36.5 kg/m².      Intake/Output Summary (Last 24 hours) at 06/28/18 0536  Last data filed at 06/27/18 2300   Gross per 24 hour   Intake              300 ml   Output                0 ml   Net              300 ml       Ortho/SPM Exam    AAOx4  NAD  RRR  No increased WOB    LUE:  Shoulder immobilizer in place  AIN, PIN, M, R, U intact    Significant Labs:   CBC:     Recent Labs  Lab 06/26/18  0739 06/27/18  0629 06/28/18  0344   WBC 9.75 8.83 9.31   HGB 8.8* 8.8* 8.1*   HCT 28.2* 29.0* 26.6*    377* 400*     CMP:     Recent Labs  Lab 06/26/18  1259 06/26/18  1729 06/27/18  0629   * 134* 137   K 3.9 4.0 4.0    99 102   CO2 24 22* 24   GLU 84 192* 110   BUN 12 14 12   CREATININE 0.6 0.8 0.6   CALCIUM 9.1 9.1 9.2   ANIONGAP 10 13 11   EGFRNONAA >60.0 >60.0 >60.0     All pertinent labs within the past 24 hours have been " reviewed.    Significant Imaging: None

## 2018-06-29 ENCOUNTER — TELEPHONE (OUTPATIENT)
Dept: ORTHOPEDICS | Facility: CLINIC | Age: 58
End: 2018-06-29

## 2018-06-29 VITALS
WEIGHT: 233 LBS | TEMPERATURE: 99 F | SYSTOLIC BLOOD PRESSURE: 144 MMHG | BODY MASS INDEX: 36.57 KG/M2 | HEIGHT: 67 IN | HEART RATE: 80 BPM | DIASTOLIC BLOOD PRESSURE: 70 MMHG | OXYGEN SATURATION: 98 % | RESPIRATION RATE: 18 BRPM

## 2018-06-29 PROBLEM — D62 ACUTE BLOOD LOSS ANEMIA: Status: ACTIVE | Noted: 2018-06-29

## 2018-06-29 LAB
ACANTHOCYTES BLD QL SMEAR: ABNORMAL
ANION GAP SERPL CALC-SCNC: 13 MMOL/L
ANISOCYTOSIS BLD QL SMEAR: ABNORMAL
AUER BODIES BLD QL SMEAR: ABNORMAL
BASO STIPL BLD QL SMEAR: ABNORMAL
BASOPHILS # BLD AUTO: 0.04 K/UL
BASOPHILS # BLD AUTO: ABNORMAL K/UL
BASOPHILS NFR BLD: 0.4 %
BASOPHILS NFR BLD: ABNORMAL %
BLASTS NFR BLD MANUAL: ABNORMAL %
BUN SERPL-MCNC: 10 MG/DL
BURR CELLS BLD QL SMEAR: ABNORMAL
CABOT RINGS BLD QL SMEAR: ABNORMAL
CALCIUM SERPL-MCNC: 8.4 MG/DL
CHLORIDE SERPL-SCNC: 104 MMOL/L
CO2 SERPL-SCNC: 20 MMOL/L
CREAT SERPL-MCNC: 0.6 MG/DL
DACRYOCYTES BLD QL SMEAR: ABNORMAL
DIFFERENTIAL METHOD: ABNORMAL
DIFFERENTIAL METHOD: ABNORMAL
DOHLE BOD BLD QL SMEAR: ABNORMAL
EOSINOPHIL # BLD AUTO: 0.2 K/UL
EOSINOPHIL # BLD AUTO: ABNORMAL K/UL
EOSINOPHIL NFR BLD: 1.4 %
EOSINOPHIL NFR BLD: ABNORMAL %
ERYTHROCYTE [DISTWIDTH] IN BLOOD BY AUTOMATED COUNT: 21.2 %
ERYTHROCYTE [DISTWIDTH] IN BLOOD BY AUTOMATED COUNT: 21.2 %
ERYTHROCYTE [DISTWIDTH] IN BLOOD BY AUTOMATED COUNT: ABNORMAL %
EST. GFR  (AFRICAN AMERICAN): >60 ML/MIN/1.73 M^2
EST. GFR  (NON AFRICAN AMERICAN): >60 ML/MIN/1.73 M^2
GIANT PLATELETS BLD QL SMEAR: ABNORMAL
GLUCOSE SERPL-MCNC: 110 MG/DL
HCT VFR BLD AUTO: 20.2 %
HCT VFR BLD AUTO: 22.3 %
HCT VFR BLD AUTO: ABNORMAL %
HEINZ BOD BLD QL SMEAR: ABNORMAL
HGB BLD-MCNC: 6.3 G/DL
HGB BLD-MCNC: 6.9 G/DL
HGB BLD-MCNC: ABNORMAL G/DL
HGB C CRY RBC QL MICRO: ABNORMAL
HOWELL-JOLLY BOD BLD QL SMEAR: ABNORMAL
HYPOCHROMIA BLD QL SMEAR: ABNORMAL
IMM GRANULOCYTES # BLD AUTO: 0.06 K/UL
IMM GRANULOCYTES # BLD AUTO: ABNORMAL K/UL
IMM GRANULOCYTES NFR BLD AUTO: 0.6 %
IMM GRANULOCYTES NFR BLD AUTO: ABNORMAL %
LYMPHOCYTES # BLD AUTO: 1.7 K/UL
LYMPHOCYTES # BLD AUTO: ABNORMAL K/UL
LYMPHOCYTES NFR BLD: 15.4 %
LYMPHOCYTES NFR BLD: ABNORMAL %
MCH RBC QN AUTO: 22.8 PG
MCH RBC QN AUTO: 23.4 PG
MCH RBC QN AUTO: ABNORMAL PG
MCHC RBC AUTO-ENTMCNC: 30.9 G/DL
MCHC RBC AUTO-ENTMCNC: 31.2 G/DL
MCHC RBC AUTO-ENTMCNC: ABNORMAL G/DL
MCV RBC AUTO: 74 FL
MCV RBC AUTO: 75 FL
MCV RBC AUTO: ABNORMAL FL
MEGAKARYOCYTIC FRAGMENTS: ABNORMAL
METAMYELOCYTES NFR BLD MANUAL: ABNORMAL %
MONOCYTES # BLD AUTO: 1.2 K/UL
MONOCYTES # BLD AUTO: ABNORMAL K/UL
MONOCYTES NFR BLD: 10.8 %
MONOCYTES NFR BLD: ABNORMAL %
MYELOCYTES NFR BLD MANUAL: ABNORMAL %
NEUTROPHILS # BLD AUTO: 7.8 K/UL
NEUTROPHILS # BLD AUTO: ABNORMAL K/UL
NEUTROPHILS NFR BLD: 71.4 %
NEUTROPHILS NFR BLD: ABNORMAL %
NEUTS BAND NFR BLD MANUAL: ABNORMAL %
NRBC BLD-RTO: 0 /100 WBC
NRBC BLD-RTO: ABNORMAL /100 WBC
OVALOCYTES BLD QL SMEAR: ABNORMAL
PAPPENHEIMER BOD BLD QL SMEAR: ABNORMAL
PLASMODIUM BLD QL SMEAR: ABNORMAL
PLATELET # BLD AUTO: 323 K/UL
PLATELET # BLD AUTO: 398 K/UL
PLATELET # BLD AUTO: ABNORMAL K/UL
PLATELET BLD QL SMEAR: ABNORMAL
PMV BLD AUTO: 9.2 FL
PMV BLD AUTO: 9.4 FL
PMV BLD AUTO: ABNORMAL FL
POIKILOCYTOSIS BLD QL SMEAR: ABNORMAL
POLYCHROMASIA BLD QL SMEAR: ABNORMAL
POTASSIUM SERPL-SCNC: 3.6 MMOL/L
PROMYELOCYTES NFR BLD MANUAL: ABNORMAL %
RBC # BLD AUTO: 2.69 M/UL
RBC # BLD AUTO: 3.02 M/UL
RBC # BLD AUTO: ABNORMAL M/UL
RBC AGGLUT BLD QL: ABNORMAL
ROULEAUX BLD QL SMEAR: ABNORMAL
SCHISTOCYTES BLD QL SMEAR: ABNORMAL
SCHISTOCYTES BLD QL SMEAR: ABNORMAL
SICKLE CELLS BLD QL SMEAR: ABNORMAL
SMUDGE CELLS BLD QL SMEAR: ABNORMAL
SODIUM SERPL-SCNC: 137 MMOL/L
SPHEROCYTES BLD QL SMEAR: ABNORMAL
STOMATOCYTES BLD QL SMEAR: ABNORMAL
TARGETS BLD QL SMEAR: ABNORMAL
TOXIC GRANULES BLD QL SMEAR: ABNORMAL
WBC # BLD AUTO: 10.84 K/UL
WBC # BLD AUTO: 15.59 K/UL
WBC # BLD AUTO: ABNORMAL K/UL
WBC NRBC COR # BLD: ABNORMAL K/UL
WBC OTHER NFR BLD MANUAL: ABNORMAL %
WBC TOXIC VACUOLES BLD QL SMEAR: ABNORMAL

## 2018-06-29 PROCEDURE — 85027 COMPLETE CBC AUTOMATED: CPT

## 2018-06-29 PROCEDURE — 25000003 PHARM REV CODE 250: Performed by: NURSE PRACTITIONER

## 2018-06-29 PROCEDURE — 80048 BASIC METABOLIC PNL TOTAL CA: CPT

## 2018-06-29 PROCEDURE — 25000003 PHARM REV CODE 250: Performed by: HOSPITALIST

## 2018-06-29 PROCEDURE — 97168 OT RE-EVAL EST PLAN CARE: CPT

## 2018-06-29 PROCEDURE — 63600175 PHARM REV CODE 636 W HCPCS: Performed by: STUDENT IN AN ORGANIZED HEALTH CARE EDUCATION/TRAINING PROGRAM

## 2018-06-29 PROCEDURE — 63700000 PHARM REV CODE 250 ALT 637 W/O HCPCS: Performed by: HOSPITALIST

## 2018-06-29 PROCEDURE — 11000001 HC ACUTE MED/SURG PRIVATE ROOM

## 2018-06-29 PROCEDURE — 99239 HOSP IP/OBS DSCHRG MGMT >30: CPT | Mod: ,,, | Performed by: HOSPITALIST

## 2018-06-29 PROCEDURE — 36415 COLL VENOUS BLD VENIPUNCTURE: CPT

## 2018-06-29 PROCEDURE — P9021 RED BLOOD CELLS UNIT: HCPCS

## 2018-06-29 PROCEDURE — 85025 COMPLETE CBC W/AUTO DIFF WBC: CPT

## 2018-06-29 PROCEDURE — 99231 SBSQ HOSP IP/OBS SF/LOW 25: CPT | Mod: ,,, | Performed by: ANESTHESIOLOGY

## 2018-06-29 PROCEDURE — 25000003 PHARM REV CODE 250: Performed by: STUDENT IN AN ORGANIZED HEALTH CARE EDUCATION/TRAINING PROGRAM

## 2018-06-29 PROCEDURE — 97110 THERAPEUTIC EXERCISES: CPT

## 2018-06-29 RX ORDER — OXYCODONE AND ACETAMINOPHEN 5; 325 MG/1; MG/1
2 TABLET ORAL EVERY 8 HOURS PRN
Qty: 21 TABLET | Refills: 0
Start: 2018-06-29 | End: 2018-08-09

## 2018-06-29 RX ORDER — OXYCODONE AND ACETAMINOPHEN 5; 325 MG/1; MG/1
1 TABLET ORAL EVERY 8 HOURS PRN
Qty: 21 TABLET | Refills: 0 | Status: SHIPPED | OUTPATIENT
Start: 2018-06-29 | End: 2018-06-29

## 2018-06-29 RX ORDER — HYDROCODONE BITARTRATE AND ACETAMINOPHEN 500; 5 MG/1; MG/1
TABLET ORAL
Status: DISCONTINUED | OUTPATIENT
Start: 2018-06-29 | End: 2018-06-30 | Stop reason: HOSPADM

## 2018-06-29 RX ADMIN — OXYCODONE HYDROCHLORIDE 5 MG: 5 TABLET ORAL at 07:06

## 2018-06-29 RX ADMIN — ROPIVACAINE HYDROCHLORIDE 6 ML/HR: 2 INJECTION, SOLUTION EPIDURAL; INFILTRATION at 05:06

## 2018-06-29 RX ADMIN — ACETAMINOPHEN 1000 MG: 500 TABLET, COATED ORAL at 05:06

## 2018-06-29 RX ADMIN — ACETAMINOPHEN 1000 MG: 500 TABLET, COATED ORAL at 12:06

## 2018-06-29 RX ADMIN — HYDROXYZINE HYDROCHLORIDE 25 MG: 25 TABLET, FILM COATED ORAL at 12:06

## 2018-06-29 RX ADMIN — CITALOPRAM HYDROBROMIDE 40 MG: 40 TABLET ORAL at 08:06

## 2018-06-29 RX ADMIN — METHOCARBAMOL 500 MG: 500 TABLET ORAL at 08:06

## 2018-06-29 RX ADMIN — OXYCODONE HYDROCHLORIDE 5 MG: 5 TABLET ORAL at 10:06

## 2018-06-29 RX ADMIN — OXYCODONE HYDROCHLORIDE 5 MG: 5 TABLET ORAL at 12:06

## 2018-06-29 RX ADMIN — METHOCARBAMOL 500 MG: 500 TABLET ORAL at 03:06

## 2018-06-29 RX ADMIN — FENOFIBRATE 160 MG: 160 TABLET ORAL at 08:06

## 2018-06-29 RX ADMIN — OXYCODONE HYDROCHLORIDE 5 MG: 5 TABLET ORAL at 03:06

## 2018-06-29 RX ADMIN — METOPROLOL SUCCINATE 25 MG: 25 TABLET, EXTENDED RELEASE ORAL at 08:06

## 2018-06-29 RX ADMIN — OXYCODONE HYDROCHLORIDE 5 MG: 5 TABLET ORAL at 05:06

## 2018-06-29 RX ADMIN — HYDRALAZINE HYDROCHLORIDE 25 MG: 25 TABLET, FILM COATED ORAL at 01:06

## 2018-06-29 RX ADMIN — AMLODIPINE BESYLATE 10 MG: 10 TABLET ORAL at 08:06

## 2018-06-29 RX ADMIN — LOSARTAN POTASSIUM 25 MG: 25 TABLET, FILM COATED ORAL at 08:06

## 2018-06-29 RX ADMIN — PREGABALIN 150 MG: 150 CAPSULE ORAL at 09:06

## 2018-06-29 RX ADMIN — CELECOXIB 200 MG: 200 CAPSULE ORAL at 08:06

## 2018-06-29 RX ADMIN — HYDRALAZINE HYDROCHLORIDE 25 MG: 25 TABLET, FILM COATED ORAL at 10:06

## 2018-06-29 RX ADMIN — HYDRALAZINE HYDROCHLORIDE 25 MG: 25 TABLET, FILM COATED ORAL at 05:06

## 2018-06-29 RX ADMIN — OXYCODONE HYDROCHLORIDE 5 MG: 5 TABLET ORAL at 08:06

## 2018-06-29 NOTE — ASSESSMENT & PLAN NOTE
Shannon Coleman is a 57 y.o. female with a left proximal humerus fracture on 6/28/18    Pain control: multimodal, managed by anesthesia pain service  PT/OT: nadiya ZAVALETA for pendulums and gentle ROM  DVT PPx: FCDs at all times when not ambulating  Abx: postop clinda    Dispo: stable from orthopedic management perspective once pain is controlled well by oral medications

## 2018-06-29 NOTE — PLAN OF CARE
Problem: Occupational Therapy Goal  Goal: Occupational Therapy Goal  Goals to be met by: 7/6/18     Patient will increase functional independence with ADLs by performing:    UE Dressing with Minimal Assistance.  LE Dressing with Modified [place].  Grooming while standing at sink with Modified [place].  Toileting from toilet with [place] for hygiene and clothing management.   Bathing from  edge of bed with [place].  Toilet transfer to toilet with Modified [place].  Increased functional strength to WFL for L UE.  Upper extremity exercise program x15 reps per handout, with assistance as needed.    Goals updated.

## 2018-06-29 NOTE — PLAN OF CARE
Ortho appt made. See below. PCP appt made.    Future Appointments  Date Time Provider Department Center   7/10/2018 11:00 AM Ramon Box MD [facility]   7/12/2018 10:00 AM Frances Jang PA-C [facility] ORTHO Yaya maicol

## 2018-06-29 NOTE — PROGRESS NOTES
"[facility]  Orthopedics  Progress Note    Patient Name: Shannon Coleman  MRN: [id number]  Admission Date: 6/24/2018  Hospital Length of Stay: 5 days  Attending Provider: Clif Page MD  Primary Care Provider: Ramon Box MD  Follow-up For: Procedure(s) (LRB):  ORIF, HUMERUS, PROXIMAL- synthes- left- c arm (Left)  ORIF, FRACTURE, HUMERUS - shaft (Left)    Post-Operative Day: 1 Day Post-Op  Subjective:     Principal Problem:Syncope    Principal Orthopedic Problem: left proximal humerus fracture    Interval History: Patient seen and examined at bedside.  No acute events overnight.  Pain controlled. Had ORIF left humerus yesterday.     Review of patient's allergies indicates:  No Known Allergies    Current Facility-Administered Medications   Medication    acetaminophen tablet 1,000 mg    amLODIPine tablet 10 mg    celecoxib capsule 200 mg    citalopram tablet 40 mg    fenofibrate tablet 160 mg    hydrALAZINE tablet 25 mg    hydrOXYzine HCl tablet 25 mg    losartan tablet 25 mg    methocarbamol tablet 500 mg    metoprolol succinate (TOPROL-XL) 24 hr tablet 25 mg    morphine injection 2 mg    morphine injection 2 mg    morphine injection 2 mg    ondansetron injection 4 mg    oxyCODONE immediate release tablet 10 mg    oxyCODONE immediate release tablet 5 mg    pregabalin capsule 150 mg    promethazine (PHENERGAN) 6.25 mg in dextrose 5 % 50 mL IVPB    ropivacaine (PF) 2 mg/ml (0.2%) infusion     Objective:     Vital Signs (Most Recent):  Temp: 97.8 °F (36.6 °C) (06/29/18 0609)  Pulse: 83 (06/29/18 0609)  Resp: 18 (06/29/18 0609)  BP: (!) 144/69 (06/29/18 0609)  SpO2: 98 % (06/29/18 0609) Vital Signs (24h Range):  Temp:  [97.1 °F (36.2 °C)-99.3 °F (37.4 °C)] 97.8 °F (36.6 °C)  Pulse:  [] 83  Resp:  [11-20] 18  SpO2:  [91 %-100 %] 98 %  BP: (124-176)/(54-85) 144/69     Weight: 105.7 kg (233 lb)  Height: 5' 7" (170.2 cm)  Body mass index is 36.49 kg/m².      Intake/Output " Summary (Last 24 hours) at 06/29/18 0613  Last data filed at 06/29/18 0546   Gross per 24 hour   Intake           2336.5 ml   Output              850 ml   Net           1486.5 ml       Ortho/SPM Exam    AAOx4  NAD  RRR  No increased WOB    LUE:  Dressing c/d/i  Sling in place  Weak EPL, wrist extensors secondary to block, otherwise AIN, PIN, M, R, U intact    Significant Labs:   CBC:     Recent Labs  Lab 06/27/18  0629 06/28/18  0344 06/28/18  1525   WBC 8.83 9.31 11.79   HGB 8.8* 8.1* 7.6*   HCT 29.0* 26.6* 24.4*   * 400* 369*     CMP:     Recent Labs  Lab 06/27/18  0629 06/28/18  0344 06/28/18  1525    139 139   K 4.0 3.5 3.4*    103 104   CO2 24 24 27    105 130*   BUN 12 13 11   CREATININE 0.6 0.6 0.6   CALCIUM 9.2 9.1 8.3*   PROT  --   --  6.2   ALBUMIN  --   --  2.9*   BILITOT  --   --  0.5   ALKPHOS  --   --  56   AST  --   --  45*   ALT  --   --  34   ANIONGAP 11 12 8   EGFRNONAA >60.0 >60.0 >60.0     All pertinent labs within the past 24 hours have been reviewed.    Significant Imaging: None    Assessment/Plan:     Closed fracture of proximal end of left humerus    Shannon Coleman is a 57 y.o. female with a left proximal humerus fracture on 6/28/18    Pain control: multimodal, managed by anesthesia pain service  PT/OT: NWB LUE, okay for pendulums and gentle ROM  DVT PPx: FCDs at all times when not ambulating  Abx: postop clinda    Dispo: stable from orthopedic management perspective once pain is controlled well by oral medications                Christian Henderson MD  Orthopedics  [facility]    Att Note:  I agree with the above assessment and plan.    Quinn Parker MD

## 2018-06-29 NOTE — SUBJECTIVE & OBJECTIVE
"Principal Problem:Syncope    Principal Orthopedic Problem: left proximal humerus fracture    Interval History: Patient seen and examined at bedside.  No acute events overnight.  Pain controlled. Had ORIF left humerus yesterday.     Review of patient's allergies indicates:  No Known Allergies    Current Facility-Administered Medications   Medication    acetaminophen tablet 1,000 mg    amLODIPine tablet 10 mg    celecoxib capsule 200 mg    citalopram tablet 40 mg    fenofibrate tablet 160 mg    hydrALAZINE tablet 25 mg    hydrOXYzine HCl tablet 25 mg    losartan tablet 25 mg    methocarbamol tablet 500 mg    metoprolol succinate (TOPROL-XL) 24 hr tablet 25 mg    morphine injection 2 mg    morphine injection 2 mg    morphine injection 2 mg    ondansetron injection 4 mg    oxyCODONE immediate release tablet 10 mg    oxyCODONE immediate release tablet 5 mg    pregabalin capsule 150 mg    promethazine (PHENERGAN) 6.25 mg in dextrose 5 % 50 mL IVPB    ropivacaine (PF) 2 mg/ml (0.2%) infusion     Objective:     Vital Signs (Most Recent):  Temp: 97.8 °F (36.6 °C) (06/29/18 0609)  Pulse: 83 (06/29/18 0609)  Resp: 18 (06/29/18 0609)  BP: (!) 144/69 (06/29/18 0609)  SpO2: 98 % (06/29/18 0609) Vital Signs (24h Range):  Temp:  [97.1 °F (36.2 °C)-99.3 °F (37.4 °C)] 97.8 °F (36.6 °C)  Pulse:  [] 83  Resp:  [11-20] 18  SpO2:  [91 %-100 %] 98 %  BP: (124-176)/(54-85) 144/69     Weight: 105.7 kg (233 lb)  Height: 5' 7" (170.2 cm)  Body mass index is 36.49 kg/m².      Intake/Output Summary (Last 24 hours) at 06/29/18 0613  Last data filed at 06/29/18 0546   Gross per 24 hour   Intake           2336.5 ml   Output              850 ml   Net           1486.5 ml       Ortho/SPM Exam    AAOx4  NAD  RRR  No increased WOB    LUE:  Dressing c/d/i  Sling in place  Weak EPL, wrist extensors secondary to block, otherwise AIN, PIN, M, R, U intact    Significant Labs:   CBC:     Recent Labs  Lab 06/27/18  0629 06/28/18  0344 " 06/28/18  1525   WBC 8.83 9.31 11.79   HGB 8.8* 8.1* 7.6*   HCT 29.0* 26.6* 24.4*   * 400* 369*     CMP:     Recent Labs  Lab 06/27/18  0629 06/28/18  0344 06/28/18  1525    139 139   K 4.0 3.5 3.4*    103 104   CO2 24 24 27    105 130*   BUN 12 13 11   CREATININE 0.6 0.6 0.6   CALCIUM 9.2 9.1 8.3*   PROT  --   --  6.2   ALBUMIN  --   --  2.9*   BILITOT  --   --  0.5   ALKPHOS  --   --  56   AST  --   --  45*   ALT  --   --  34   ANIONGAP 11 12 8   EGFRNONAA >60.0 >60.0 >60.0     All pertinent labs within the past 24 hours have been reviewed.    Significant Imaging: None

## 2018-06-29 NOTE — PT/OT/SLP PROGRESS
Physical Therapy      Patient Name:  Shannon Coleman   MRN:  [id number]    Patient not seen today secondary to no PT services required at this time. Pt is at baseline and has no environmental barriers to enter home. Pt educated on performing light ROM LUE to assist with swelling   ..    Michele Wu, PT

## 2018-06-29 NOTE — PROGRESS NOTES
Hgb drop due to IVF given in OR, blood loss in OR and frequent blood draws. Discussed with pt, updated her on this as the cause given low concern for GIB. she is ok with 1U PRBC to be given now. She wants to go home after blood administration.     Discussed with her nurse, consent sign. 1U PRBC to be given    Clif Page MD

## 2018-06-29 NOTE — PLAN OF CARE
Future Appointments  Date Time Provider Department Center   7/10/2018 11:00 AM Ramon Box MD [facility]   7/12/2018 10:00 AM Frances Jang PA-C [facility]        06/29/18 1439   Final Note   Assessment Type Final Discharge Note   Discharge Disposition Home   Hospital Follow Up  Appt(s) scheduled? Yes   Discharge plans and expectations educations in teach back method with documentation complete? Yes   Right Care Referral Info   Post Acute Recommendation No Care

## 2018-06-29 NOTE — PROGRESS NOTES
"[facility] Hospital Medicine  Progress Note    Patient Name: Shannon Coleman  MRN: [id number]  Patient Class: IP- Inpatient   Admission Date: 6/24/2018  Length of Stay: 5 days  Attending Physician: Clif Page MD  Primary Care Provider: Ramon Box MD    [facility] Medicine Team: [facility]OSP MED A Clif Page MD    Subjective:     Principal Problem:Syncope    HPI:  57 year old woman with htn on HCTZ who presents after a syncopal episode. Pt states she felt well and was in her normal state of health throughout the day yesterday. She was sitting in her chair outside with her  when she got up to go to the bathroom for a bowel movement. She states she walked into the bathroom and suddenly felt light headed and dizzy then passed out. When she awoke, she denies any confusion but states she had soiled herself. Her  found her and helped her up. She states she continued to feel weak and "drained" but was able to  shower with assistance from her  and go to bed. She reports since the fall her left arm hurt and increasingly became worse thus she went to the ED for evaluation. She does report normal PO intake but reports she did have 4 beers earlier in the afternoon which did cause increased urinary frequency.  She denies any chest pain or palpitations prior to syncopal episode. She also denies any sob, headache, focal deficits, paraesthesias, or tongue biting.     Hospital Course:  Admitted to hospital medicine for hyponatremia and syncope. Also noted to have a left proximal humerus fracture. Orthopedics consulted and following. Na improved with IVF. Echo done for syncope workup w/o AS or WMA. Normal EF. Plan for ORIF on 6/27 per ortho.     Interval History:  No acute events overnight.  Pain controlled. Had ORIF left humerus yesterday Hgb drop due to IVF given in OR, blood loss in OR and frequent blood draws. Discussed with pt, updated her on this as the cause given low concern for " GIB. she is ok with 1U PRBC to be given now. She wants to go home after blood administration.      Discussed with her nurse, consent sign. 1U PRBC to be given      Review of Systems   Constitutional: Negative for activity change, fever and unexpected weight change.   HENT: Negative for rhinorrhea and sore throat.    Eyes: Negative for pain and visual disturbance.   Respiratory: Negative for cough and shortness of breath.    Cardiovascular: Negative for chest pain and palpitations.   Gastrointestinal: Negative for abdominal pain, diarrhea and nausea.   Genitourinary: Negative for dysuria, hematuria and urgency.   Musculoskeletal: Positive for myalgias. Negative for arthralgias, gait problem and neck stiffness.   Skin: Negative for rash.   Neurological: Positive for syncope. Negative for light-headedness and headaches.     Objective:     Vital Signs (Most Recent):  Temp: 98.8 °F (37.1 °C) (06/29/18 1400)  Pulse: 87 (06/29/18 1400)  Resp: 18 (06/29/18 1400)  BP: 139/73 (06/29/18 1400)  SpO2: 97 % (06/29/18 1400) Vital Signs (24h Range):  Temp:  [97.1 °F (36.2 °C)-99.3 °F (37.4 °C)] 98.8 °F (37.1 °C)  Pulse:  [] 87  Resp:  [12-20] 18  SpO2:  [94 %-100 %] 97 %  BP: (124-171)/(58-84) 139/73     Weight: 105.7 kg (233 lb)  Body mass index is 36.49 kg/m².    Intake/Output Summary (Last 24 hours) at 06/29/18 1455  Last data filed at 06/29/18 1200   Gross per 24 hour   Intake            566.5 ml   Output                0 ml   Net            566.5 ml      Physical Exam   Constitutional: She is oriented to person, place, and time. She appears well-developed and well-nourished.   Cardiovascular: Normal rate, regular rhythm and normal heart sounds.  Exam reveals no gallop and no friction rub.    No murmur heard.  Pulmonary/Chest: Effort normal and breath sounds normal. No respiratory distress. She has no wheezes. She has no rales.   Abdominal: Soft. Bowel sounds are normal. She exhibits no distension. There is no tenderness.    Musculoskeletal:   Left arm in sling    Neurological: She is alert and oriented to person, place, and time. She displays normal reflexes. No cranial nerve deficit. Coordination normal.   Skin: Skin is warm and dry.       Computed MELD-Na score unavailable. Necessary lab results were not found in the last year.  Computed MELD score unavailable. Necessary lab results were not found in the last year.    Significant Labs:  CBC:    Recent Labs  Lab 06/28/18  1525 06/29/18  0531 06/29/18  1033   WBC 11.79 SEE COMMENT 10.84   HGB 7.6* SEE COMMENT 6.3*   HCT 24.4* SEE COMMENT 20.2*   * SEE COMMENT 323     CMP:    Recent Labs  Lab 06/28/18  0344 06/28/18  1525 06/29/18  0531    139 137   K 3.5 3.4* 3.6    104 104   CO2 24 27 20*    130* 110   BUN 13 11 10   CREATININE 0.6 0.6 0.6   CALCIUM 9.1 8.3* 8.4*   PROT  --  6.2  --    ALBUMIN  --  2.9*  --    BILITOT  --  0.5  --    ALKPHOS  --  56  --    AST  --  45*  --    ALT  --  34  --    ANIONGAP 12 8 13   EGFRNONAA >60.0 >60.0 >60.0     PTINR:  No results for input(s): INR in the last 48 hours.    Significant Procedures:   Dobutamine Stress Test with Color Flow: No results found for this or any previous visit.    2D Echo: No results found for this or any previous visit.     CT scan 6/24: Comminuted fracture of the surgical neck of the left humerus with retraction and mild anteromedial displacement of the major fracture fragment.    Assessment/Plan:      * Syncope    Pt with a syncopal episode - likely related to orthostasis given labs hypovolemic hyponatremia   - Echo w aortic sclerosis w/o stenosis. No WMA. Normal EF.   - monitor on telemetry for arrhythmia - no events so far   - ddx includes seizure given BM; spot EEG done, neg so far  - no further issue noted, stable for D/C  - stable        Acute blood loss anemia    - Hgb drop due to IVF given in OR, blood loss in OR and frequent blood draws.   - Discussed with pt, updated her on this as the  cause given low concern for GIB.   - she is ok with 1U PRBC to be given now.   - She wants to go home after blood administration.   - DC after blood admin        Essential hypertension, benign    - restarted amlodipine, holding HYZAAR due to concern for electrolytes issue/dehydration for now  - hydralazine TID started          Hyponatremia    Likely hypovolemic hyponatremia 2/2 to diuretics - hctz. Also increased urination with etoh.   On admit. Improved with IVF  - stable now    - no further work up now  - monitor with daily renal panel            Closed fracture of proximal end of left humerus    Orthopedics following.   - pain control with perocet 5-325   -CT scan shows a c omminuted fracture of the surgical neck of the left humerus with retraction and mild anteromedial displacement of the major fracture fragment.  - Ortho to perform ORIF 6/28  - RCRI score of 0-1 which corresponds to up to a 1% chance of perioperative cardiac events. No further testing needed prior to surgery.   - Pending pain control procedure, possible D/C home, pt is anxious to go home with family         Depression    Stable   Cont home citalopram           VTE Risk Mitigation         Ordered     Place KALIN hose  Until discontinued      06/25/18 1538     Place sequential compression device  Until discontinued      06/25/18 1538     IP VTE HIGH RISK PATIENT  Once      06/24/18 0504     Place sequential compression device  Until discontinued      06/24/18 0504              Clif Page MD  Department of Hospital Medicine   [facility]

## 2018-06-29 NOTE — ASSESSMENT & PLAN NOTE
Pt with a syncopal episode - likely related to orthostasis given labs hypovolemic hyponatremia   - Echo w aortic sclerosis w/o stenosis. No WMA. Normal EF.   - monitor on telemetry for arrhythmia - no events so far   - ddx includes seizure given BM; spot EEG done, neg so far  - no further issue noted, stable for D/C  - stable

## 2018-06-29 NOTE — PT/OT/SLP RE-EVAL
Occupational Therapy   Re-evaluation    Name: Shannon Coleman  MRN: [id number]  Admitting Diagnosis:  Syncope 1 Day Post-Op    Recommendations:     Discharge Recommendations: home  Discharge Equipment Recommendations:  none  Barriers to discharge:  None    History:     Past Medical History:   Diagnosis Date    Allergy     Anxiety     Arthritis     Depression     GERD (gastroesophageal reflux disease)     Hyperlipidemia     Hypertension     Tobacco abuse     Vitamin D deficiency disease        Past Surgical History:   Procedure Laterality Date    child birth      OPEN REDUCTION AND INTERNAL FIXATION (ORIF) OF FRACTURE OF PROXIMAL HUMERUS Left 6/28/2018    Procedure: ORIF, HUMERUS, PROXIMAL- synthes- left- c arm;  Surgeon: Quinn Parker MD;  Location: [address];  Service: Orthopedics;  Laterality: Left;    ORIF HUMERUS FRACTURE Left 6/28/2018    Procedure: ORIF, FRACTURE, HUMERUS - shaft;  Surgeon: Quinn Parker MD;  Location: [address];  Service: Orthopedics;  Laterality: Left;       Subjective     Chief Complaint: Pt is s/p ORIF L humerus and cleared for gentle PROM only and pendelum exercises.  Patient/Family stated goals: To get better  Communicated with: RN prior to session.  Pain/Comfort:  · Pain Rating 1: 0/10    Objective:     Patient found with:  (no lines)    General Precautions: Standard, fall   Orthopedic Precautions:LUE non weight bearing   Braces: UE Sling     Occupational Performance:    Bed Mobility:    · Patient completed Supine to Sit with independence    Functional Mobility/Transfers:  · Patient completed Sit <> Stand Transfer with independence  with  no assistive device   · Patient completed Toilet Transfer Stand Pivot technique with independence with  no AD    Activities of Daily Living:  · UB Dressing: maximal assistance to don hospital gown and sling.  · Toileting: independence for toilet hygiene.    Cognitive/Visual Perceptual:  Cognitive/Psychosocial Skills:     -    "    Oriented to: Person, Place, Time and Situation   -       Follows Commands/attention:Follows multistep  commands    Physical Exam:  Dominant hand:    -       RH  Upper Extremity Range of Motion:     -       Right Upper Extremity: WFL  -       Left Upper Extremity: PROM- L shoulder flexion- 45*; shoulder abduction- 40*; shoulder extension- 30* AROM elbow flexion- 70* and AROM for rest of L UE is WFL.  Upper Extremity Strength:    -       Right Upper Extremity: WFL  -       Left Upper Extremity: NT L shoulder, 2/5 L biceps and 3/5 rest of L UE.  Pt c no c/o numbness.    Patient left up in chair with all lines intact, call button in reach, RN notified and family present    [facility] 6 Click:  [facility] Total Score: 21    Treatment & Education:  PROM to L shoulder for shoulder flex/ext/abd performed 1x10 while sitting up and tolerated well.  Educated pt on pendelum exercises.  Education:    Assessment:     Shannon Coleman is a 57 y.o. female with a medical diagnosis of Syncope.  She was able to perform all T/F's I.  Has 2/5 L biceps and 3/5 strength rest of L UE.  Able to achieve approx. 45* of PROM shoulder flex, 40* of PROM shoulder abduction, and 30* of PROM shoulder extension.  Educated pt on sling management and HEP.  Performance deficits affecting function are weakness, impaired self care skills, decreased upper extremity function, impaired coordination, impaired fine motor, orthopedic precautions.      Rehab Prognosis:  Good; patient would benefit from acute skilled OT services to address these deficits and reach maximum level of function.         Clinical Decision Makin.  OT Low:  "Pt evaluation falls under low complexity for evaluation coding due to performance deficits noted in 1-3 areas as stated above and 0 co-morbities affecting current functional status. Data obtained from problem focused assessments. No modifications or assistance was required for completion of evaluation. Only brief occupational " "profile and history review completed."     Plan:     Patient to be seen 6 x/week to address the above listed problems via self-care/home management, therapeutic activities, therapeutic exercises  · Plan of Care Expires:    · Plan of Care Reviewed with: patient, family    This Plan of care has been discussed with the patient who was involved in its development and understands and is in agreement with the identified goals and treatment plan    GOALS:    Occupational Therapy Goals        Problem: Occupational Therapy Goal    Goal Priority Disciplines Outcome Interventions   Occupational Therapy Goal     OT, PT/OT     Description:  Goals to be met by: 7/6/18     Patient will increase functional independence with ADLs by performing:    UE Dressing with Minimal Assistance.  LE Dressing with Modified [place].  Grooming while standing at sink with Modified [place].  Toileting from toilet with [place] for hygiene and clothing management.   Bathing from  edge of bed with [place].  Toilet transfer to toilet with Modified [place].  Increased functional strength to WFL for L UE.  Upper extremity exercise program x15 reps per handout, with assistance as needed.                      Time Tracking:     OT Date of Treatment: 06/29/18  OT Start Time: 1050  OT Stop Time: 1120  OT Total Time (min): 30 min    Billable Minutes:Re-eval 10  Therapeutic Exercise 20    SHERRON Richard  6/29/2018      "

## 2018-06-29 NOTE — TELEPHONE ENCOUNTER
----- Message from Lesli Zacarias sent at 6/29/2018  9:13 AM CDT -----  Contact: Daughter/ [phone] Eve   The patient daughter would like a call back to see if someone is coming to the patient room to do a f/u.

## 2018-06-29 NOTE — SUBJECTIVE & OBJECTIVE
Interval History:  No acute events overnight.  Pain controlled. Had ORIF left humerus yesterday Hgb drop due to IVF given in OR, blood loss in OR and frequent blood draws. Discussed with pt, updated her on this as the cause given low concern for GIB. she is ok with 1U PRBC to be given now. She wants to go home after blood administration.      Discussed with her nurse, consent sign. 1U PRBC to be given      Review of Systems   Constitutional: Negative for activity change, fever and unexpected weight change.   HENT: Negative for rhinorrhea and sore throat.    Eyes: Negative for pain and visual disturbance.   Respiratory: Negative for cough and shortness of breath.    Cardiovascular: Negative for chest pain and palpitations.   Gastrointestinal: Negative for abdominal pain, diarrhea and nausea.   Genitourinary: Negative for dysuria, hematuria and urgency.   Musculoskeletal: Positive for myalgias. Negative for arthralgias, gait problem and neck stiffness.   Skin: Negative for rash.   Neurological: Positive for syncope. Negative for light-headedness and headaches.     Objective:     Vital Signs (Most Recent):  Temp: 98.8 °F (37.1 °C) (06/29/18 1400)  Pulse: 87 (06/29/18 1400)  Resp: 18 (06/29/18 1400)  BP: 139/73 (06/29/18 1400)  SpO2: 97 % (06/29/18 1400) Vital Signs (24h Range):  Temp:  [97.1 °F (36.2 °C)-99.3 °F (37.4 °C)] 98.8 °F (37.1 °C)  Pulse:  [] 87  Resp:  [12-20] 18  SpO2:  [94 %-100 %] 97 %  BP: (124-171)/(58-84) 139/73     Weight: 105.7 kg (233 lb)  Body mass index is 36.49 kg/m².    Intake/Output Summary (Last 24 hours) at 06/29/18 1455  Last data filed at 06/29/18 1200   Gross per 24 hour   Intake            566.5 ml   Output                0 ml   Net            566.5 ml      Physical Exam   Constitutional: She is oriented to person, place, and time. She appears well-developed and well-nourished.   Cardiovascular: Normal rate, regular rhythm and normal heart sounds.  Exam reveals no gallop and no  friction rub.    No murmur heard.  Pulmonary/Chest: Effort normal and breath sounds normal. No respiratory distress. She has no wheezes. She has no rales.   Abdominal: Soft. Bowel sounds are normal. She exhibits no distension. There is no tenderness.   Musculoskeletal:   Left arm in sling    Neurological: She is alert and oriented to person, place, and time. She displays normal reflexes. No cranial nerve deficit. Coordination normal.   Skin: Skin is warm and dry.       Computed MELD-Na score unavailable. Necessary lab results were not found in the last year.  Computed MELD score unavailable. Necessary lab results were not found in the last year.    Significant Labs:  CBC:    Recent Labs  Lab 06/28/18  1525 06/29/18  0531 06/29/18  1033   WBC 11.79 SEE COMMENT 10.84   HGB 7.6* SEE COMMENT 6.3*   HCT 24.4* SEE COMMENT 20.2*   * SEE COMMENT 323     CMP:    Recent Labs  Lab 06/28/18  0344 06/28/18  1525 06/29/18  0531    139 137   K 3.5 3.4* 3.6    104 104   CO2 24 27 20*    130* 110   BUN 13 11 10   CREATININE 0.6 0.6 0.6   CALCIUM 9.1 8.3* 8.4*   PROT  --  6.2  --    ALBUMIN  --  2.9*  --    BILITOT  --  0.5  --    ALKPHOS  --  56  --    AST  --  45*  --    ALT  --  34  --    ANIONGAP 12 8 13   EGFRNONAA >60.0 >60.0 >60.0     PTINR:  No results for input(s): INR in the last 48 hours.    Significant Procedures:   Dobutamine Stress Test with Color Flow: No results found for this or any previous visit.    2D Echo: No results found for this or any previous visit.     CT scan 6/24: Comminuted fracture of the surgical neck of the left humerus with retraction and mild anteromedial displacement of the major fracture fragment.

## 2018-06-29 NOTE — ADDENDUM NOTE
Addendum  created 06/29/18 1155 by Juanita Lau MD    Charge Capture section accepted, Sign clinical note

## 2018-06-29 NOTE — TELEPHONE ENCOUNTER
Spoke with pt's daughter, Eve.  States she was actually looking for someone with anesthesia.  She will ask the nurse at the nurse's station on the floor for assistance     Scheduled pt's 2 wk post op appointment

## 2018-06-29 NOTE — ANESTHESIA POST-OP PAIN MANAGEMENT
Acute Pain Service Progress Note    Shannon Coleman is a 57 y.o., female, [id number].    Surgery:  ORIF, HUMERUS, PROXIMAL- synthes- left- c arm (Left Arm Upper)      ORIF, FRACTURE, HUMERUS - shaft (Left Arm Upper)    Post Op Day #: 1    Catheter type: perineural  interscalene    Infusion type: Ropivacaine 0.2%  6 ml/hr basal    Problem List:    Active Hospital Problems    Diagnosis  POA    *Syncope [R55]  Yes    Essential hypertension, benign [I10]  Unknown    Hyponatremia [E87.1]  Yes    Closed fracture of proximal end of left humerus [S42.202A]  Yes    Depression [F32.9]  Yes    Anxiety [F41.9]  Yes      Resolved Hospital Problems    Diagnosis Date Resolved POA   No resolved problems to display.       Subjective:     General appearance of alert, oriented, no complaints   Pain with rest: 3    Numbers   Pain with movement: 5    Numbers   Side Effects    1. Pruritis No    2. Nausea No    3. Motor Blockade No, 0=Ability to raise lower extremities off bed    4. Sedation No, 1=awake and alert    Objective:        Catheter site clean, dry, intact         Vitals   Vitals:    06/29/18 0707   BP:    Pulse: 94   Resp:    Temp:         Labs    Admission on 06/24/2018   No results displayed because visit has over 200 results.           Meds   Current Facility-Administered Medications   Medication Dose Route Frequency Provider Last Rate Last Dose    acetaminophen tablet 1,000 mg  1,000 mg Oral Q6H Brenda Scott MD   1,000 mg at 06/29/18 0545    amLODIPine tablet 10 mg  10 mg Oral Daily Clif Page MD   10 mg at 06/27/18 0843    celecoxib capsule 200 mg  200 mg Oral Daily Brenda Scott MD        citalopram tablet 40 mg  40 mg Oral Daily Mounika Torres MD   40 mg at 06/27/18 0843    fenofibrate tablet 160 mg  160 mg Oral Daily Mounika Torres MD   160 mg at 06/27/18 0843    hydrALAZINE tablet 25 mg  25 mg Oral Q8H Clif Page MD   25 mg at 06/29/18 0545    hydrOXYzine HCl tablet 25 mg  25 mg Oral  QID PRN Jf Ray NP        losartan tablet 25 mg  25 mg Oral Daily Clif Page MD   25 mg at 06/27/18 1533    methocarbamol tablet 500 mg  500 mg Oral TID Brenda Scott MD   500 mg at 06/28/18 2227    metoprolol succinate (TOPROL-XL) 24 hr tablet 25 mg  25 mg Oral Daily Mounika Torres MD   25 mg at 06/27/18 0843    morphine injection 2 mg  2 mg Intravenous Q1H PRN Brenda Scott MD        morphine injection 2 mg  2 mg Intravenous Q1H PRN Brenda Scott MD        morphine injection 2 mg  2 mg Intravenous Q1H PRN Brenda Scott MD        ondansetron injection 4 mg  4 mg Intravenous Q12H PRN Brenda Scott MD   4 mg at 06/28/18 1333    oxyCODONE immediate release tablet 10 mg  10 mg Oral Q3H PRN Brenda Scott MD   10 mg at 06/28/18 1757    oxyCODONE immediate release tablet 5 mg  5 mg Oral Q3H PRN Brenda Scott MD   5 mg at 06/29/18 0559    pregabalin capsule 150 mg  150 mg Oral QHS Brenda Scott MD   150 mg at 06/28/18 2227    promethazine (PHENERGAN) 6.25 mg in dextrose 5 % 50 mL IVPB  6.25 mg Intravenous Q6H PRN Brenda Scott MD        ropivacaine (PF) 2 mg/ml (0.2%) infusion  6 mL/hr Perineural Continuous Brenda Scott MD 6 mL/hr at 06/29/18 0546 6 mL/hr at 06/29/18 0546       Assessment:     Pain control adequate    Plan:     Patient doing well, continue present treatment.  If discharged this afternoon, likely home with On-Q barbara.    Evaluator Silvio Benedict        I have reviewed and concur with the resident's history, physical, assessment, and plan.  I have personally interviewed and examined the patient at bedside.  See below addendum for my evaluation and additional findings.    Pain well controlled - discussed having family around post-op to ensure she could go home with OnQ today if the plan is to go home today.  Will follow up this am and discuss OnQ/

## 2018-06-30 NOTE — PROGRESS NOTES
Patient was discharged as ordered. Belongings packed, instructions given, blood infusion completed, and pain medication administered. Transferred to car per wheelchair. Patient remains awake, alert, and oriented. No immediate complaints or s/s distress noted.

## 2018-07-01 NOTE — ADDENDUM NOTE
Addendum  created 07/01/18 1342 by Clari Palafox MD    Anesthesia Event edited, Sign clinical note

## 2018-07-01 NOTE — PROGRESS NOTES
Called patient with the number provided. Patient doing well, pain controlled. Perineural catheter removed with blue tip intact. All questions answered.     Clari Palafox MD  Anesthesia CA3

## 2018-07-01 NOTE — DISCHARGE SUMMARY
"[facility] Hospital Medicine  Discharge Summary      Patient Name: Shannon Coleman  MRN: [id number]  Admission Date: 6/24/2018  Hospital Length of Stay: 6 days  Discharge Date and Time: 6/30/2018 12:13 AM  Attending Physician: No att. providers found   Discharging Provider: Clif Page MD  Primary Care Provider: Ramon Box MD  [facility] Medicine Team: [facility]OSP MED A Clif Page MD    HPI:   57 year old woman with htn on HCTZ who presents after a syncopal episode. Pt states she felt well and was in her normal state of health throughout the day yesterday. She was sitting in her chair outside with her  when she got up to go to the bathroom for a bowel movement. She states she walked into the bathroom and suddenly felt light headed and dizzy then passed out. When she awoke, she denies any confusion but states she had soiled herself. Her  found her and helped her up. She states she continued to feel weak and "drained" but was able to  shower with assistance from her  and go to bed. She reports since the fall her left arm hurt and increasingly became worse thus she went to the ED for evaluation. She does report normal PO intake but reports she did have 4 beers earlier in the afternoon which did cause increased urinary frequency.  She denies any chest pain or palpitations prior to syncopal episode. She also denies any sob, headache, focal deficits, paraesthesias, or tongue biting.     Procedure(s) (LRB):  ORIF, HUMERUS, PROXIMAL- synthes- left- c arm (Left)  ORIF, FRACTURE, HUMERUS - shaft (Left)      Hospital Course:   Admitted to hospital medicine on 6/24/18  for hyponatremia and syncope. Also noted to have a left proximal humerus fracture. Orthopedics consulted and following. Pt with a syncopal episode - likely related to orthostasis given labs hypovolemic hyponatremia. 2D Echo w aortic sclerosis w/o stenosis, no WMA, normal EF. She was monitored on telemetry for " arrhythmia - no events so far. Spot EEG done, neg so far. Na improved with IVF. Initial plan for ORIF on 6/27 per ortho but moved back to 6/28. She tolerated the procedure well. Pain controlled with perineural block with Ropivacaine. She is in agreement to go home with OnQ and PRN Perc per ortho team. Hgb drop due to IVF given in OR, blood loss in OR and frequent blood draws. Discussed with pt, updated her on this as the cause given low concern for GIB. she is ok with 1U PRBC to be given now. She wants to go home after blood administration. D/C  On 6/24. Pt with close PCP follow up and up coming ortho appt.        Consults:   Consults         Status Ordering Provider     Inpatient consult to Orthopedic Surgery  Once     Provider:  (Not yet assigned)    BAYRON Nayak          No new Assessment & Plan notes have been filed under this hospital service since the last note was generated.  Service: Hospital Medicine    Final Active Diagnoses:    Diagnosis Date Noted POA    PRINCIPAL PROBLEM:  Syncope [R55] 06/26/2018 Yes    Acute blood loss anemia [D62] 06/29/2018 No    Essential hypertension, benign [I10] 06/27/2018 Yes    Hyponatremia [E87.1] 06/26/2018 Yes    Closed fracture of proximal end of left humerus [S42.202A] 06/24/2018 Yes    Depression [F32.9] 01/21/2013 Yes    Anxiety [F41.9] 07/18/2012 Yes      Problems Resolved During this Admission:    Diagnosis Date Noted Date Resolved POA       Discharged Condition: stable    Disposition: Home or Self Care    Follow Up:  Follow-up Information     Go to Ramon Box MD.    Specialty:  Family Medicine  Why:  Office visit, Post Hospital Follow Up on 7/10/2018 11:00 AM  Contact information:  [address] [facility] [address]  [phone]             Go to Frances Jang PA-C.    Specialty:  Orthopedic Surgery  Why:  Office visit, Post Hospital Follow Up with Orthopedic Surgery on 7/12/2018 10:00 AM  Contact information:  [address]  [facility] [address]  [phone]                 Patient Instructions:     Diet Adult Regular     Notify your health care provider if you experience any of the following:  severe uncontrolled pain     Activity as tolerated         Significant Diagnostic Studies: Labs:   CMP   Recent Labs  Lab 06/29/18  0531      K 3.6      CO2 20*      BUN 10   CREATININE 0.6   CALCIUM 8.4*   ANIONGAP 13   ESTGFRAFRICA >60.0   EGFRNONAA >60.0   , CBC   Recent Labs  Lab 06/29/18  0531 06/29/18  1033 06/29/18  1517   WBC SEE COMMENT 10.84 15.59*   HGB SEE COMMENT 6.3* 6.9*   HCT SEE COMMENT 20.2* 22.3*   PLT SEE COMMENT 323 398*   , INR No results found for: INR, PROTIME and A1C: No results for input(s): HGBA1C in the last 4320 hours.  Microbiology: Blood Culture No results found for: LABBLOO and Urine Culture  No results found for: LABURIN    Pending Diagnostic Studies:     Procedure Component Value Units Date/Time    Troponin I #2 [[id number]] Collected:  06/24/18 0141    Order Status:  Sent Lab Status:  In process Updated:  06/24/18 0141    Specimen:  Blood from Blood          Medications:  Reconciled Home Medications:      Medication List      CHANGE how you take these medications    amLODIPine 10 MG tablet  Commonly known as:  NORVASC  TAKE 1 TABLET (10 MG TOTAL) BY MOUTH ONCE DAILY.  What changed:  Another medication with the same name was removed. Continue taking this medication, and follow the directions you see here.     atorvastatin 20 MG tablet  Commonly known as:  LIPITOR  TAKE 1 TABLET (20 MG TOTAL) BY MOUTH ONCE DAILY.  What changed:  Another medication with the same name was removed. Continue taking this medication, and follow the directions you see here.     fenofibrate 160 MG Tab  Take 1 tablet (160 mg total) by mouth once daily.  What changed:  Another medication with the same name was removed. Continue taking this medication, and follow the directions you see here.     hydrocortisone 2.5 %  ointment  Apply topically 2 (two) times daily. To affected area  What changed:  Another medication with the same name was removed. Continue taking this medication, and follow the directions you see here.     losartan-hydrochlorothiazide 100-25 mg 100-25 mg per tablet  Commonly known as:  HYZAAR  TAKE 1 TABLET BY MOUTH EVERY MORNING.  What changed:  Another medication with the same name was removed. Continue taking this medication, and follow the directions you see here.     metFORMIN 500 MG tablet  Commonly known as:  GLUCOPHAGE  TAKE 1 TABLET (500 MG TOTAL) BY MOUTH DAILY WITH BREAKFAST.  What changed:  Another medication with the same name was removed. Continue taking this medication, and follow the directions you see here.     metoprolol succinate 25 MG 24 hr tablet  Commonly known as:  TOPROL-XL  TAKE 1 TABLET (25 MG TOTAL) BY MOUTH ONCE DAILY.  What changed:  Another medication with the same name was removed. Continue taking this medication, and follow the directions you see here.        CONTINUE taking these medications    cetirizine 10 MG tablet  Commonly known as:  ZYRTEC  Take 1 tablet (10 mg total) by mouth every evening.     citalopram 40 MG tablet  Commonly known as:  CELEXA  Take 1 tablet (40 mg total) by mouth once daily.     clonazePAM 1 MG tablet  Commonly known as:  KLONOPIN  Take 1 tablet (1 mg total) by mouth every evening.     ergocalciferol 50,000 unit Cap  Commonly known as:  ERGOCALCIFEROL  Take 1 capsule (50,000 Units total) by mouth every 7 days.     meclizine 25 mg tablet  Commonly known as:  ANTIVERT  Take 1 tablet (25 mg total) by mouth 3 (three) times daily as needed for Nausea.     omega-3 fatty acids-vitamin E 1,000 mg Cap  Commonly known as:  FISH OIL  Take 2 capsules by mouth once daily.     omeprazole 20 MG capsule  Commonly known as:  PRILOSEC  TAKE ONE CAPSULE BY MOUTH EVERY DAY     ONE-A-DAY WOMENS FORMULA ORAL  Take 1 tablet by mouth once daily.     paroxetine 40 MG  tablet  Commonly known as:  PAXIL  TAKE 1 TABLET (40 MG TOTAL) BY MOUTH EVERY MORNING.        STOP taking these medications    diclofenac sodium 1 % Gel  Commonly known as:  VOLTAREN     meloxicam 7.5 MG tablet  Commonly known as:  MOBIC     rosuvastatin 5 MG tablet  Commonly known as:  CRESTOR     traMADol 50 mg tablet  Commonly known as:  ULTRAM        ASK your doctor about these medications    * oxyCODONE-acetaminophen 5-325 mg per tablet  Commonly known as:  PERCOCET  Take 2 tablets by mouth every 8 (eight) hours as needed for Pain (Severe Pain).  Ask about: Which instructions should I use?     * oxyCODONE-acetaminophen  mg per tablet  Commonly known as:  PERCOCET  Take 1 tablet by mouth every 4 hours as needed for pain  Ask about: Which instructions should I use?        * This list has 2 medication(s) that are the same as other medications prescribed for you. Read the directions carefully, and ask your doctor or other care provider to review them with you.                Indwelling Lines/Drains at time of discharge:   Lines/Drains/Airways     Epidural Line                 Perineural Analgesia/Anesthesia Assessment (using dermatomes) Epidural 06/28/18 0873 2 days                Time spent on the discharge of patient: >30 minutes  Patient was seen and examined on the date of discharge and determined to be suitable for discharge.         Clif Page MD  Department of Hospital Medicine  [facility]

## 2018-07-02 LAB
BLD PROD TYP BPU: NORMAL
BLD PROD TYP BPU: NORMAL
BLOOD UNIT EXPIRATION DATE: NORMAL
BLOOD UNIT EXPIRATION DATE: NORMAL
BLOOD UNIT TYPE CODE: 6200
BLOOD UNIT TYPE CODE: 6200
BLOOD UNIT TYPE: NORMAL
BLOOD UNIT TYPE: NORMAL
CODING SYSTEM: NORMAL
CODING SYSTEM: NORMAL
DISPENSE STATUS: NORMAL
DISPENSE STATUS: NORMAL
TRANS ERYTHROCYTES VOL PATIENT: NORMAL ML
TRANS ERYTHROCYTES VOL PATIENT: NORMAL ML

## 2018-07-12 ENCOUNTER — OFFICE VISIT (OUTPATIENT)
Dept: ORTHOPEDICS | Facility: CLINIC | Age: 58
End: 2018-07-12
Payer: COMMERCIAL

## 2018-07-12 ENCOUNTER — TELEPHONE (OUTPATIENT)
Dept: ORTHOPEDICS | Facility: CLINIC | Age: 58
End: 2018-07-12

## 2018-07-12 DIAGNOSIS — S42.202A CLOSED FRACTURE OF PROXIMAL END OF LEFT HUMERUS, UNSPECIFIED FRACTURE MORPHOLOGY, INITIAL ENCOUNTER: Primary | ICD-10-CM

## 2018-07-12 PROCEDURE — 99024 POSTOP FOLLOW-UP VISIT: CPT | Mod: S$GLB,,, | Performed by: PHYSICIAN ASSISTANT

## 2018-07-12 PROCEDURE — 99999 PR PBB SHADOW E&M-EST. PATIENT-LVL III: CPT | Mod: PBBFAC,,, | Performed by: PHYSICIAN ASSISTANT

## 2018-07-13 NOTE — PROGRESS NOTES
Ms. Coleman is a 57-year-old female who passed out in her bathroom, fell, and had a fracture of the proximal humerus as well as extending down to just above the mid humeral shaft.  Patient treated with ORIF on 06/28/2017    Ms. Coleman is here today for a post-operative visit after a   Open treatment of left proximal humerus fracture and left humeral shaft fracture with internal fixation  by Dr. Parker  on 06/28/2017.    IMPLANTS:  Synthes 8-hole Philos humerus plate and interfragmentary screws.    Interval History:    she reports that she is doing ok.  Pain is controleld.  she is  taking pain medication.    she denies fever, chills, and sweats since the time of the surgery.     Physical exam:  Dressing taken down.  Incision is clean, dry and intact.  Sutures removed without difficulty.      RADS: none done today    Assessment:  Post-op visit ( 2 weeks)    Plan:  Current care, treatment plan, precautions, activity level/ modifications, limitations, rehabilitation exercises and proposed future treatment were discussed with the patient. We discussed the need to monitor for changes in symptoms and condition and report them to the physician.  Discussed importance of compliance with all appointments and follow up examinations.   - The patient was advised to keep the incision clean and dry for the next 24 hours after which she may wash the area with antibacterial soap in the shower. Will not submerge until the incision is completely healed  -Patient was advised to monitor wound closely and multiple times daily for any problems. Call clinic immediately or report to ED for immediate medical attention for any complications including reopening of wound, drainage, purulence, redness, streaking, odor, pain out of proportion, fever, chills, etc.   -PT/OT, [place], Patient is responsible to establish and continue care   -range of motion as tolerated    - NWB   - pain medication: no refill needed,    Pain medication refill policy  provided to patient for review.   - Patient is to return to clinic in 4 weeks  At time x-ray of her humerus is needed       If there are any questions prior to scheduled follow up, the patient was instructed to contact the office

## 2018-07-25 ENCOUNTER — CLINICAL SUPPORT (OUTPATIENT)
Dept: REHABILITATION | Facility: HOSPITAL | Age: 58
End: 2018-07-25
Payer: COMMERCIAL

## 2018-07-25 DIAGNOSIS — M79.622 LEFT UPPER ARM PAIN: ICD-10-CM

## 2018-07-25 PROCEDURE — 97161 PT EVAL LOW COMPLEX 20 MIN: CPT

## 2018-07-25 NOTE — PLAN OF CARE
[facility]  Physical Therapy Initial Evaluation    Name: Shannon Coleman  Clinic Number: [id number]    Therapy Diagnosis:   Encounter Diagnosis   Name Primary?    Left upper arm pain      Physician: Frances Jang PA-C    Physician Orders: PT Eval and Treat   Medical Diagnosis: S42.202A (ICD-10-CM) - Closed fracture of proximal end of left humerus, unspecified fracture morphology, initial encounter  Date of Surgery: 6/28/18  Evaluation Date: 7/25/2018  Authorization Period Expiration: 12/31/18  Plan of Care Certification Period: 12  Visit # / Visits authorized: 1/ 10    Time In: 1130 am  Time Out: 1200  Total Billable Time: 30 minutes    Precautions: Standard     Subjective   Date of onset: 6/29/18  History of current condition - Shannon reports: she passed out in the bathroom from being overheated and fell and hit her L arm. She does not know what she hit. She then went to the ER and had sx July 2, 2018 for a proximal humerus fx. She is using her arm just to assist with ADLs. She was shown some exercises in the hospital and has been compliant with those exercises since.       Past Medical History:   Diagnosis Date    Allergy     Anxiety     Arthritis     Depression     GERD (gastroesophageal reflux disease)     Hyperlipidemia     Hypertension     Tobacco abuse     Vitamin D deficiency disease      Shannon Coleman  has a past surgical history that includes child birth; open reduction and internal fixation (orif) of fracture of proximal humerus (Left, 6/28/2018); and ORIF humerus fracture (Left, 6/28/2018).    Shannon has a current medication list which includes the following prescription(s): amlodipine, atorvastatin, cetirizine, citalopram, clonazepam, ergocalciferol, fenofibrate, hydrocortisone, losartan-hydrochlorothiazide 100-25 mg, meclizine, metformin, metoprolol succinate, multivit,calc,mins/iron/folic, omega-3 fatty acids-vitamin e, omeprazole, oxycodone-acetaminophen,  oxycodone-acetaminophen, and paroxetine.    Review of patient's allergies indicates:  No Known Allergies     Imaging, X-ray    Prior Therapy: yes  Social History: she lives with their spouse  Occupation: none  Prior Level of Function: Independent  Current Level of Function: no lifting, reaching, holding with LUE    Pain:  Current 3/10, worst 8/10, best 3/10   Location: left shoulder   Description: Tight  Aggravating Factors: Night Time and Lifting  Easing Factors: tylenol    Pts goals: return to using left arm like prior to fall    Objective     Active Range of Motion: Measured in degrees      Shoulder Left Right   Flexion 115 150   Abduction 40 180   ER at 0 NT NT   ER at 45 40 70   IR 60 50           Passive Range of Motion: Measured in degrees    Elbow Left Right   Flexion 140 140   Extension 0 0           Upper Extremity Strength      Shoulder Left Right   Shoulder flexion: 5/5 5/5   Shoulder Abduction: 5/5 5/5   Shoulder ER 5/5 5/5   Shoulder IR 5/5 5/5                 Palpation Shoulder:  TTP around incision site          TREATMENT   Treatment Time In: 1150 am  Treatment Time Out: 1200 am  Total Treatment time separate from Evaluation time:10 min    Shannon received therapeutic exercises to develop endurance, ROM, flexibility and posture for 10 minutes including:  Pendulums x 2 min( fwd/bck; side to side)  Table slides x20 (flex and scap)  Scapular retractions 10 sec x10      Home Exercises and Patient Education Provided    Education provided re: HEP to Go    Written Home Exercises Provided: yes.  Exercises were reviewed and Shannon was able to demonstrate them prior to the end of the session.   Pt received a written copy of exercises to perform at home. Shannon demonstrated good  understanding of the education provided.     See media section for exercises given.   Assessment   Shannon is a 58 y.o. female referred to outpatient Physical Therapy with a medical diagnosis of L shoulder pain.  Pt presents with  decreased L shoulder ROM, decreased strength and increased pain that is limiting functional movement.    Pt prognosis is Good.   Pt will benefit from skilled outpatient Physical Therapy to address the deficits stated above and in the chart below, provide pt/family education, and to maximize pt's level of independence.     Plan of care discussed with patient: Yes  Pt's spiritual, cultural and educational needs considered and patient is agreeable to the plan of care and goals as stated below:     Anticipated Barriers for therapy: none    Medical Necessity is demonstrated by the following  History  Co-morbidities and personal factors that may impact the plan of care Co-morbidities:   none    Personal Factors:   no deficits     low   Examination  Body Structures and Functions, activity limitations and participation restrictions that may impact the plan of care Body Regions:   upper extremities    Body Systems:    ROM  strength    Participation Restrictions:   none    Activity limitations:   Mobility  lifting and carrying objects    Self care  no deficits    Domestic Life  no deficits    Interactions/Relationships  no deficits    Life Areas  no deficits    Community and Social Life  no deficits         low   Clinical Presentation stable and uncomplicated low   Decision Making/ Complexity Score: low     Goals:  Short Term Goals: (4 weeks)   - Pt will increase ROM to 180 deg L shoulder flex and abd, 90 deg ER and 80 IR   - Pt will increase strength to 3/5 LUE  - Decrease Pain to 3/10 as worst with arm movement  - Pt to self correct posture with minimal cues for scapular retraction  - Pt independent with HEP with progressions.     Long Term Goals (8 Weeks):  - Pt will increase ROM to WNL LUE painfree  - Pt will increase strength to 5/5 LUE  - Decrease Pain to 0/10   - Pt to return to 80% PLOF      Plan   Certification Period/Plan of care expiration: 7/25/2018 to 9/19/18.    Outpatient Physical Therapy 1 times weekly for 8  weeks to include the following interventions: Manual Therapy, Moist Heat/ Ice and Therapeutic Exercise.     Frances Wakefield, PT, DPT

## 2018-08-09 ENCOUNTER — HOSPITAL ENCOUNTER (OUTPATIENT)
Dept: RADIOLOGY | Facility: HOSPITAL | Age: 58
Discharge: HOME | End: 2018-08-09
Attending: PHYSICIAN ASSISTANT
Payer: COMMERCIAL

## 2018-08-09 ENCOUNTER — OFFICE VISIT (OUTPATIENT)
Dept: ORTHOPEDICS | Facility: CLINIC | Age: 58
End: 2018-08-09
Payer: COMMERCIAL

## 2018-08-09 DIAGNOSIS — G89.18 POST-OPERATIVE PAIN: ICD-10-CM

## 2018-08-09 DIAGNOSIS — M89.8X2 PAIN OF LEFT HUMERUS: Primary | ICD-10-CM

## 2018-08-09 DIAGNOSIS — M89.8X2 PAIN OF LEFT HUMERUS: ICD-10-CM

## 2018-08-09 PROCEDURE — 99999 PR PBB SHADOW E&M-EST. PATIENT-LVL III: CPT | Mod: PBBFAC,,, | Performed by: PHYSICIAN ASSISTANT

## 2018-08-09 PROCEDURE — 99024 POSTOP FOLLOW-UP VISIT: CPT | Mod: S$GLB,,, | Performed by: PHYSICIAN ASSISTANT

## 2018-08-09 PROCEDURE — 73060 X-RAY EXAM OF HUMERUS: CPT | Mod: 26,LT,, | Performed by: RADIOLOGY

## 2018-08-09 PROCEDURE — 73060 X-RAY EXAM OF HUMERUS: CPT | Mod: TC,LT

## 2018-08-09 RX ORDER — HYDROCODONE BITARTRATE AND ACETAMINOPHEN 10; 325 MG/1; MG/1
1 TABLET ORAL
Qty: 60 TABLET | Refills: 0 | Status: SHIPPED | OUTPATIENT
Start: 2018-08-09

## 2018-08-09 NOTE — PROGRESS NOTES
Ms. Coleman is a 57-year-old female who passed out in her bathroom, fell, and had a fracture of the proximal humerus as well as extending down to just above the mid humeral shaft.  Patient treated with ORIF on 06/28/2017    Ms. Coleman is here today for a post-operative visit after a   Open treatment of left proximal humerus fracture and left humeral shaft fracture with internal fixation  by Dr. Parker  on 06/28/2017.    IMPLANTS:  Synthes 8-hole Philos humerus plate and interfragmentary screws.    Interval History:    she reports that she is doing ok.  Pain is not controlled.  she is not taking pain medication. She will take Tylenol as needed, though is having trouble with night time pain.   she denies fever, chills, and sweats since the time of the surgery.     Physical exam:    Incision is clean, dry and intact. Scabbing mid incision, no signs of infection. full range of motion of wrist and elbow, shoulder flexion 30, abduction 45        RADS: There is hardware stabilizing a proximal humeral fracture good alignment no complication    Assessment:  Post-op visit ( 6 weeks)    Plan:  Current care, treatment plan, precautions, activity level/ modifications, limitations, rehabilitation exercises and proposed future treatment were discussed with the patient. We discussed the need to monitor for changes in symptoms and condition and report them to the physician.  Discussed importance of compliance with all appointments and follow up examinations.   -  she may wash the area with antibacterial soap in the shower. Will not submerge until the incision is completely healed  -Patient was advised to monitor wound closely and multiple times daily for any problems. Call clinic immediately or report to ED for immediate medical attention for any complications including reopening of wound, drainage, purulence, redness, streaking, odor, pain out of proportion, fever, chills, etc.   -PT/OT, Noble, Patient is responsible to  establish and continue care   -range of motion as tolerated    - weight bear 2-5 lbs  - pain medication: no refill needed,    Pain medication refill policy provided to patient for review.   - Patient is to return to clinic in 6 weeks  At time x-ray of her humerus is needed       If there are any questions prior to scheduled follow up, the patient was instructed to contact the office

## 2020-09-01 ENCOUNTER — OFFICE VISIT (OUTPATIENT)
Dept: URGENT CARE | Facility: CLINIC | Age: 60
End: 2020-09-01
Payer: COMMERCIAL

## 2020-09-01 VITALS
OXYGEN SATURATION: 96 % | RESPIRATION RATE: 14 BRPM | DIASTOLIC BLOOD PRESSURE: 90 MMHG | TEMPERATURE: 98 F | BODY MASS INDEX: 32.96 KG/M2 | HEART RATE: 86 BPM | WEIGHT: 210 LBS | HEIGHT: 67 IN | SYSTOLIC BLOOD PRESSURE: 152 MMHG

## 2020-09-01 DIAGNOSIS — H60.13 CELLULITIS OF BOTH EXTERNAL EARS: Primary | ICD-10-CM

## 2020-09-01 PROCEDURE — 96372 PR INJECTION,THERAP/PROPH/DIAG2ST, IM OR SUBCUT: ICD-10-PCS | Mod: S$GLB,,, | Performed by: FAMILY MEDICINE

## 2020-09-01 PROCEDURE — 99214 OFFICE O/P EST MOD 30 MIN: CPT | Mod: 25,S$GLB,, | Performed by: FAMILY MEDICINE

## 2020-09-01 PROCEDURE — 96372 THER/PROPH/DIAG INJ SC/IM: CPT | Mod: S$GLB,,, | Performed by: FAMILY MEDICINE

## 2020-09-01 PROCEDURE — 99214 PR OFFICE/OUTPT VISIT, EST, LEVL IV, 30-39 MIN: ICD-10-PCS | Mod: 25,S$GLB,, | Performed by: FAMILY MEDICINE

## 2020-09-01 RX ORDER — KETOROLAC TROMETHAMINE 30 MG/ML
30 INJECTION, SOLUTION INTRAMUSCULAR; INTRAVENOUS
Status: COMPLETED | OUTPATIENT
Start: 2020-09-01 | End: 2020-09-01

## 2020-09-01 RX ORDER — NEOMYCIN SULFATE, POLYMYXIN B SULFATE, HYDROCORTISONE 3.5; 10000; 1 MG/ML; [USP'U]/ML; MG/ML
3 SOLUTION/ DROPS AURICULAR (OTIC) 3 TIMES DAILY
Qty: 10 ML | Refills: 0 | Status: SHIPPED | OUTPATIENT
Start: 2020-09-01 | End: 2020-09-11

## 2020-09-01 RX ORDER — SULFAMETHOXAZOLE AND TRIMETHOPRIM 800; 160 MG/1; MG/1
1 TABLET ORAL 2 TIMES DAILY
Qty: 20 TABLET | Refills: 0 | Status: SHIPPED | OUTPATIENT
Start: 2020-09-01

## 2020-09-01 RX ADMIN — KETOROLAC TROMETHAMINE 30 MG: 30 INJECTION, SOLUTION INTRAMUSCULAR; INTRAVENOUS at 04:09

## 2020-09-01 NOTE — PATIENT INSTRUCTIONS
Cellulitis  Cellulitis is an infection of the deep layers of skin. A break in the skin, such as a cut or scratch, can let bacteria under the skin. If the bacteria get to deep layers of the skin, it can be serious. If not treated, cellulitis can get into the bloodstream and lymph nodes. The infection can then spread throughout the body. This causes serious illness.  Cellulitis causes the affected skin to become red, swollen, warm, and sore. The reddened areas have a visible border. An open sore may leak fluid (pus). You may have a fever, chills, and pain.  Cellulitis is treated with antibiotics taken for 7 to 10 days. An open sore may be cleaned and covered with cool wet gauze. Symptoms should get better 1 to 2 days after treatment is started. Make sure to take all the antibiotics for the full number of days until they are gone. Keep taking the medicine even if your symptoms go away.  Home care  Follow these tips:  · Limit the use of the part of your body with cellulitis.   · If the infection is on your leg, keep your leg raised while sitting. This will help to reduce swelling.  · Take all of the antibiotic medicine exactly as directed until it is gone. Do not miss any doses, especially during the first 7 days. Dont stop taking the medicine when your symptoms get better.  · Keep the affected area clean and dry.  · Wash your hands with soap and warm water before and after touching your skin. Anyone else who touches your skin should also wash his or her hands. Don't share towels.  Follow-up care  Follow up with your healthcare provider, or as advised. If your infection does not go away on the first antibiotic, your healthcare provider will prescribe a different one.  When to seek medical advice  Call your healthcare provider right away if any of these occur:  · Red areas that spread  · Swelling or pain that gets worse  · Fluid leaking from the skin (pus)  · Fever higher of 100.4º F (38.0º C) or higher after 2 days  on antibiotics  Date Last Reviewed: 9/1/2016  © 5743-3535 [employer]. [address] [place] [address], [place] [address]. All rights reserved. This information is not intended as a substitute for professional medical care. Always follow your healthcare professional's instructions.

## 2020-09-01 NOTE — PROGRESS NOTES
"Subjective:       Patient ID: Shannon Coleman is a 60 y.o. female.    Vitals:  height is 5' 7" (1.702 m) and weight is 95.3 kg (210 lb). Her temperature is 98.4 °F (36.9 °C). Her blood pressure is 152/90 (abnormal) and her pulse is 86. Her respiration is 14 and oxygen saturation is 96%.     Chief Complaint: Otalgia    PT is a 60 y.o. female complaining of bilateral ear infections.  This started 3 days ago. It started out just aching, and now it hurts to the touch on both sides.    Otalgia   There is pain in both ears. This is a new problem. The current episode started in the past 7 days. The problem occurs constantly. There has been no fever. The pain is at a severity of 9/10. The pain is severe. Associated symptoms include hearing loss and a sore throat. Pertinent negatives include no coughing, diarrhea, headaches, rash or vomiting. Treatments tried: Tylenol.       Constitution: Negative for chills, fatigue and fever.   HENT: Positive for ear pain, hearing loss and sore throat. Negative for congestion.    Neck: Positive for painful lymph nodes.   Cardiovascular: Negative for chest pain and leg swelling.   Eyes: Negative for double vision and blurred vision.   Respiratory: Negative for cough and shortness of breath.    Gastrointestinal: Negative for nausea, vomiting and diarrhea.   Genitourinary: Negative for dysuria, frequency, urgency and history of kidney stones.   Musculoskeletal: Negative for joint pain, joint swelling, muscle cramps and muscle ache.   Skin: Positive for erythema (swelling erythema bilaterally). Negative for color change, pale, rash and bruising.   Allergic/Immunologic: Negative for seasonal allergies.   Neurological: Negative for dizziness, history of vertigo, light-headedness, passing out and headaches.   Hematologic/Lymphatic: Positive for swollen lymph nodes.   Psychiatric/Behavioral: Negative for nervous/anxious, sleep disturbance and depression. The patient is not nervous/anxious.      "   Objective:      Physical Exam   Constitutional: She does not appear ill. No distress. obesity  Cardiovascular: Normal rate, regular rhythm, normal heart sounds and normal pulses.   Pulmonary/Chest: Effort normal and breath sounds normal.   Abdominal: Soft. Bowel sounds are normal.   Musculoskeletal:         General: Tenderness: pinna bilaterally.   Neurological: She is alert.   Skin: Skin is rash. Lesions:  erythema (swelling erythema bilaterally)  Nursing note and vitals reviewed.        Assessment:       1. Cellulitis of both external ears        Plan:         Cellulitis of both external ears  -     sulfamethoxazole-trimethoprim 800-160mg (BACTRIM DS) 800-160 mg Tab; Take 1 tablet by mouth 2 (two) times daily.  Dispense: 20 tablet; Refill: 0  -     neomycin-polymyxin-hydrocortisone (CORTISPORIN) otic solution; Place 3 drops into both ears 3 (three) times daily. for 10 days  Dispense: 10 mL; Refill: 0  -     ketorolac injection 30 mg    warm compresses. F/u with PCP in 2 days

## 2021-04-16 NOTE — ASSESSMENT & PLAN NOTE
- Hgb drop due to IVF given in OR, blood loss in OR and frequent blood draws.   - Discussed with pt, updated her on this as the cause given low concern for GIB.   - she is ok with 1U PRBC to be given now.   - She wants to go home after blood administration.   - DC after blood admin   details… details… details…

## 2024-05-16 NOTE — CARE UPDATE
Patient examined in OR for neurovascular status. Patient awake and following commands. AIN, PIN, M, R, U motor and sensory intact. Plan to start pain catheter by anesthesia pain service. Stable for discharge from orthopedic standpoint once pain is well controlled. Pain will be managed by anesthesia pain service while patient has pain catheter.   no

## 2025-07-31 ENCOUNTER — ANESTHESIA EVENT (OUTPATIENT)
Dept: ENDOSCOPY | Facility: HOSPITAL | Age: 65
End: 2025-07-31
Payer: MEDICARE

## 2025-07-31 ENCOUNTER — ANESTHESIA (OUTPATIENT)
Dept: ENDOSCOPY | Facility: HOSPITAL | Age: 65
End: 2025-07-31
Payer: MEDICARE

## 2025-07-31 ENCOUNTER — HOSPITAL ENCOUNTER (INPATIENT)
Facility: HOSPITAL | Age: 65
LOS: 2 days | Discharge: HOME | DRG: 378 | End: 2025-08-02
Attending: EMERGENCY MEDICINE | Admitting: INTERNAL MEDICINE
Payer: MEDICARE

## 2025-07-31 DIAGNOSIS — I10 ESSENTIAL HYPERTENSION, BENIGN: ICD-10-CM

## 2025-07-31 DIAGNOSIS — R91.1 PULMONARY NODULE: ICD-10-CM

## 2025-07-31 DIAGNOSIS — K92.0 HEMATEMESIS: ICD-10-CM

## 2025-07-31 DIAGNOSIS — K92.2 GI BLEED: ICD-10-CM

## 2025-07-31 DIAGNOSIS — R73.03 PRE-DIABETES: ICD-10-CM

## 2025-07-31 DIAGNOSIS — F17.210 SMOKING GREATER THAN 30 PACK YEARS: ICD-10-CM

## 2025-07-31 DIAGNOSIS — N18.4 CKD (CHRONIC KIDNEY DISEASE), STAGE IV: ICD-10-CM

## 2025-07-31 DIAGNOSIS — R07.9 CHEST PAIN: ICD-10-CM

## 2025-07-31 DIAGNOSIS — Z75.8 DOES NOT HAVE PRIMARY CARE PROVIDER: ICD-10-CM

## 2025-07-31 DIAGNOSIS — K92.2 GASTROINTESTINAL HEMORRHAGE, UNSPECIFIED GASTROINTESTINAL HEMORRHAGE TYPE: ICD-10-CM

## 2025-07-31 DIAGNOSIS — I70.0 AORTIC CALCIFICATION: Primary | ICD-10-CM

## 2025-07-31 PROBLEM — D64.9 ANEMIA: Status: ACTIVE | Noted: 2025-07-31

## 2025-07-31 PROBLEM — R04.2 HEMOPTYSIS: Status: ACTIVE | Noted: 2025-07-31

## 2025-07-31 PROBLEM — F17.200 TOBACCO DEPENDENCY: Status: ACTIVE | Noted: 2025-07-31

## 2025-07-31 LAB
ABO + RH BLD: NORMAL
ABO + RH BLD: NORMAL
ABSOLUTE EOSINOPHIL (OHS): 0.21 K/UL
ABSOLUTE MONOCYTE (OHS): 0.9 K/UL (ref 0.3–1)
ABSOLUTE NEUTROPHIL COUNT (OHS): 6.68 K/UL (ref 1.8–7.7)
ALBUMIN SERPL BCP-MCNC: 3.1 G/DL (ref 3.5–5.2)
ALP SERPL-CCNC: 31 UNIT/L (ref 40–150)
ALT SERPL W/O P-5'-P-CCNC: 14 UNIT/L (ref 0–55)
ANION GAP (OHS): 11 MMOL/L (ref 8–16)
AST SERPL-CCNC: 25 UNIT/L (ref 0–50)
BACTERIA #/AREA URNS AUTO: NORMAL /HPF
BASOPHILS # BLD AUTO: 0.06 K/UL
BASOPHILS NFR BLD AUTO: 0.6 %
BILIRUB SERPL-MCNC: 0.2 MG/DL (ref 0.1–1)
BILIRUB UR QL STRIP.AUTO: NEGATIVE
BLD PROD TYP BPU: NORMAL
BLD PROD TYP BPU: NORMAL
BLOOD UNIT EXPIRATION DATE: NORMAL
BLOOD UNIT EXPIRATION DATE: NORMAL
BLOOD UNIT TYPE CODE: 9500
BLOOD UNIT TYPE CODE: 9500
BUN SERPL-MCNC: 27 MG/DL (ref 8–23)
CALCIUM SERPL-MCNC: 8.2 MG/DL (ref 8.7–10.5)
CAOX CRY UR QL COMP ASSIST: NORMAL
CHLORIDE SERPL-SCNC: 103 MMOL/L (ref 95–110)
CLARITY UR: CLEAR
CO2 SERPL-SCNC: 14 MMOL/L (ref 23–29)
COLOR UR AUTO: COLORLESS
CREAT SERPL-MCNC: 2 MG/DL (ref 0.5–1.4)
CROSSMATCH INTERPRETATION: NORMAL
CROSSMATCH INTERPRETATION: NORMAL
DISPENSE STATUS: NORMAL
DISPENSE STATUS: NORMAL
ERYTHROCYTE [DISTWIDTH] IN BLOOD BY AUTOMATED COUNT: 14.3 % (ref 11.5–14.5)
GFR SERPLBLD CREATININE-BSD FMLA CKD-EPI: 27 ML/MIN/1.73/M2
GLUCOSE SERPL-MCNC: 129 MG/DL (ref 70–110)
GLUCOSE UR QL STRIP: ABNORMAL
HCT VFR BLD AUTO: 24.8 % (ref 37–48.5)
HCV AB SERPL QL IA: NORMAL
HGB BLD-MCNC: 8.1 GM/DL (ref 12–16)
HGB UR QL STRIP: NEGATIVE
HIV 1+2 AB+HIV1 P24 AG SERPL QL IA: NORMAL
HYALINE CASTS UR QL AUTO: 0 /LPF (ref 0–1)
IMM GRANULOCYTES # BLD AUTO: 0.14 K/UL (ref 0–0.04)
IMM GRANULOCYTES NFR BLD AUTO: 1.3 % (ref 0–0.5)
INDIRECT COOMBS: NORMAL
INR PPP: 1 (ref 0.8–1.2)
KETONES UR QL STRIP: NEGATIVE
LEUKOCYTE ESTERASE UR QL STRIP: ABNORMAL
LIPASE SERPL-CCNC: 43 U/L (ref 4–60)
LYMPHOCYTES # BLD AUTO: 2.63 K/UL (ref 1–4.8)
MCH RBC QN AUTO: 31 PG (ref 27–31)
MCHC RBC AUTO-ENTMCNC: 32.7 G/DL (ref 32–36)
MCV RBC AUTO: 95 FL (ref 82–98)
MICROSCOPIC COMMENT: NORMAL
NITRITE UR QL STRIP: NEGATIVE
NUCLEATED RBC (/100WBC) (OHS): 0 /100 WBC
OHS QRS DURATION: 84 MS
OHS QRS DURATION: 94 MS
OHS QTC CALCULATION: 472 MS
OHS QTC CALCULATION: 505 MS
PH UR STRIP: 6 [PH]
PLATELET # BLD AUTO: 362 K/UL (ref 150–450)
PMV BLD AUTO: 10.6 FL (ref 9.2–12.9)
POCT GLUCOSE: 120 MG/DL (ref 70–110)
POTASSIUM SERPL-SCNC: 3.5 MMOL/L (ref 3.5–5.1)
PROT SERPL-MCNC: 6.3 GM/DL (ref 6–8.4)
PROT UR QL STRIP: ABNORMAL
PROTHROMBIN TIME: 11.4 SECONDS (ref 9–12.5)
RBC # BLD AUTO: 2.61 M/UL (ref 4–5.4)
RBC #/AREA URNS AUTO: 1 /HPF (ref 0–4)
RELATIVE EOSINOPHIL (OHS): 2 %
RELATIVE LYMPHOCYTE (OHS): 24.8 % (ref 18–48)
RELATIVE MONOCYTE (OHS): 8.5 % (ref 4–15)
RELATIVE NEUTROPHIL (OHS): 62.8 % (ref 38–73)
RH BLD: NORMAL
SODIUM SERPL-SCNC: 128 MMOL/L (ref 136–145)
SP GR UR STRIP: 1
SPECIMEN OUTDATE: NORMAL
SQUAMOUS #/AREA URNS AUTO: 3 /HPF
UNIT NUMBER: NORMAL
UNIT NUMBER: NORMAL
UROBILINOGEN UR STRIP-ACNC: NEGATIVE EU/DL
WBC # BLD AUTO: 10.62 K/UL (ref 3.9–12.7)
WBC #/AREA URNS AUTO: 1 /HPF (ref 0–5)

## 2025-07-31 PROCEDURE — 36430 TRANSFUSION BLD/BLD COMPNT: CPT

## 2025-07-31 PROCEDURE — 93005 ELECTROCARDIOGRAM TRACING: CPT

## 2025-07-31 PROCEDURE — 81003 URINALYSIS AUTO W/O SCOPE: CPT

## 2025-07-31 PROCEDURE — 43235 EGD DIAGNOSTIC BRUSH WASH: CPT | Performed by: INTERNAL MEDICINE

## 2025-07-31 PROCEDURE — 63600175 PHARM REV CODE 636 W HCPCS

## 2025-07-31 PROCEDURE — 99291 CRITICAL CARE FIRST HOUR: CPT

## 2025-07-31 PROCEDURE — 20600001 HC STEP DOWN PRIVATE ROOM

## 2025-07-31 PROCEDURE — 25000003 PHARM REV CODE 250: Performed by: STUDENT IN AN ORGANIZED HEALTH CARE EDUCATION/TRAINING PROGRAM

## 2025-07-31 PROCEDURE — 96368 THER/DIAG CONCURRENT INF: CPT

## 2025-07-31 PROCEDURE — 99223 1ST HOSP IP/OBS HIGH 75: CPT | Mod: 25,GC,, | Performed by: INTERNAL MEDICINE

## 2025-07-31 PROCEDURE — 86803 HEPATITIS C AB TEST: CPT | Performed by: PHYSICIAN ASSISTANT

## 2025-07-31 PROCEDURE — 30233N1 TRANSFUSION OF NONAUTOLOGOUS RED BLOOD CELLS INTO PERIPHERAL VEIN, PERCUTANEOUS APPROACH: ICD-10-PCS | Performed by: INTERNAL MEDICINE

## 2025-07-31 PROCEDURE — 0DJ08ZZ INSPECTION OF UPPER INTESTINAL TRACT, VIA NATURAL OR ARTIFICIAL OPENING ENDOSCOPIC: ICD-10-PCS | Performed by: INTERNAL MEDICINE

## 2025-07-31 PROCEDURE — 94761 N-INVAS EAR/PLS OXIMETRY MLT: CPT

## 2025-07-31 PROCEDURE — 63600175 PHARM REV CODE 636 W HCPCS: Performed by: STUDENT IN AN ORGANIZED HEALTH CARE EDUCATION/TRAINING PROGRAM

## 2025-07-31 PROCEDURE — 25000003 PHARM REV CODE 250

## 2025-07-31 PROCEDURE — 63600175 PHARM REV CODE 636 W HCPCS: Mod: JA | Performed by: EMERGENCY MEDICINE

## 2025-07-31 PROCEDURE — 43235 EGD DIAGNOSTIC BRUSH WASH: CPT | Mod: GC,,, | Performed by: INTERNAL MEDICINE

## 2025-07-31 PROCEDURE — 80053 COMPREHEN METABOLIC PANEL: CPT

## 2025-07-31 PROCEDURE — 96365 THER/PROPH/DIAG IV INF INIT: CPT

## 2025-07-31 PROCEDURE — P9016 RBC LEUKOCYTES REDUCED: HCPCS

## 2025-07-31 PROCEDURE — 96375 TX/PRO/DX INJ NEW DRUG ADDON: CPT

## 2025-07-31 PROCEDURE — 83690 ASSAY OF LIPASE: CPT

## 2025-07-31 PROCEDURE — 93010 ELECTROCARDIOGRAM REPORT: CPT | Mod: ,,, | Performed by: INTERNAL MEDICINE

## 2025-07-31 PROCEDURE — 25000242 PHARM REV CODE 250 ALT 637 W/ HCPCS

## 2025-07-31 PROCEDURE — 85610 PROTHROMBIN TIME: CPT

## 2025-07-31 PROCEDURE — 86920 COMPATIBILITY TEST SPIN: CPT

## 2025-07-31 PROCEDURE — 25000003 PHARM REV CODE 250: Performed by: EMERGENCY MEDICINE

## 2025-07-31 PROCEDURE — 37000008 HC ANESTHESIA 1ST 15 MINUTES: Performed by: INTERNAL MEDICINE

## 2025-07-31 PROCEDURE — 85025 COMPLETE CBC W/AUTO DIFF WBC: CPT

## 2025-07-31 PROCEDURE — 86901 BLOOD TYPING SEROLOGIC RH(D): CPT

## 2025-07-31 PROCEDURE — 37000009 HC ANESTHESIA EA ADD 15 MINS: Performed by: INTERNAL MEDICINE

## 2025-07-31 PROCEDURE — 87389 HIV-1 AG W/HIV-1&-2 AB AG IA: CPT | Performed by: PHYSICIAN ASSISTANT

## 2025-07-31 RX ORDER — EPHEDRINE SULFATE 50 MG/ML
INJECTION, SOLUTION INTRAVENOUS
Status: DISCONTINUED | OUTPATIENT
Start: 2025-07-31 | End: 2025-07-31

## 2025-07-31 RX ORDER — PAROXETINE 10 MG/1
40 TABLET, FILM COATED ORAL DAILY
Status: DISCONTINUED | OUTPATIENT
Start: 2025-07-31 | End: 2025-08-02 | Stop reason: HOSPADM

## 2025-07-31 RX ORDER — ONDANSETRON HYDROCHLORIDE 2 MG/ML
4 INJECTION, SOLUTION INTRAVENOUS DAILY PRN
Status: DISCONTINUED | OUTPATIENT
Start: 2025-07-31 | End: 2025-07-31 | Stop reason: HOSPADM

## 2025-07-31 RX ORDER — AMLODIPINE BESYLATE 10 MG/1
10 TABLET ORAL DAILY
Status: CANCELLED | OUTPATIENT
Start: 2025-07-31

## 2025-07-31 RX ORDER — ATORVASTATIN CALCIUM 40 MG/1
40 TABLET, FILM COATED ORAL DAILY
Status: DISCONTINUED | OUTPATIENT
Start: 2025-07-31 | End: 2025-08-02 | Stop reason: HOSPADM

## 2025-07-31 RX ORDER — FENTANYL CITRATE 50 UG/ML
INJECTION, SOLUTION INTRAMUSCULAR; INTRAVENOUS
Status: DISCONTINUED | OUTPATIENT
Start: 2025-07-31 | End: 2025-07-31

## 2025-07-31 RX ORDER — METOPROLOL SUCCINATE 50 MG/1
100 TABLET, EXTENDED RELEASE ORAL DAILY
Status: CANCELLED | OUTPATIENT
Start: 2025-07-31

## 2025-07-31 RX ORDER — IBUPROFEN 200 MG
24 TABLET ORAL
Status: DISCONTINUED | OUTPATIENT
Start: 2025-07-31 | End: 2025-08-02 | Stop reason: HOSPADM

## 2025-07-31 RX ORDER — PANTOPRAZOLE SODIUM 40 MG/10ML
80 INJECTION, POWDER, LYOPHILIZED, FOR SOLUTION INTRAVENOUS
Status: COMPLETED | OUTPATIENT
Start: 2025-07-31 | End: 2025-07-31

## 2025-07-31 RX ORDER — PANTOPRAZOLE SODIUM 40 MG/10ML
40 INJECTION, POWDER, LYOPHILIZED, FOR SOLUTION INTRAVENOUS 2 TIMES DAILY
Status: DISCONTINUED | OUTPATIENT
Start: 2025-07-31 | End: 2025-08-02

## 2025-07-31 RX ORDER — SODIUM CHLORIDE 0.9 % (FLUSH) 0.9 %
10 SYRINGE (ML) INJECTION EVERY 12 HOURS PRN
Status: DISCONTINUED | OUTPATIENT
Start: 2025-07-31 | End: 2025-08-02 | Stop reason: HOSPADM

## 2025-07-31 RX ORDER — IBUPROFEN 200 MG
16 TABLET ORAL
Status: DISCONTINUED | OUTPATIENT
Start: 2025-07-31 | End: 2025-08-02 | Stop reason: HOSPADM

## 2025-07-31 RX ORDER — ONDANSETRON HYDROCHLORIDE 2 MG/ML
INJECTION, SOLUTION INTRAVENOUS
Status: COMPLETED
Start: 2025-07-31 | End: 2025-07-31

## 2025-07-31 RX ORDER — IBUPROFEN 200 MG
1 TABLET ORAL DAILY PRN
Status: DISCONTINUED | OUTPATIENT
Start: 2025-07-31 | End: 2025-08-02 | Stop reason: HOSPADM

## 2025-07-31 RX ORDER — GLUCAGON 1 MG
1 KIT INJECTION
Status: DISCONTINUED | OUTPATIENT
Start: 2025-07-31 | End: 2025-07-31 | Stop reason: HOSPADM

## 2025-07-31 RX ORDER — GLUCAGON 1 MG
1 KIT INJECTION
Status: DISCONTINUED | OUTPATIENT
Start: 2025-07-31 | End: 2025-08-02 | Stop reason: HOSPADM

## 2025-07-31 RX ORDER — NALOXONE HCL 0.4 MG/ML
0.02 VIAL (ML) INJECTION
Status: DISCONTINUED | OUTPATIENT
Start: 2025-07-31 | End: 2025-08-02 | Stop reason: HOSPADM

## 2025-07-31 RX ORDER — ONDANSETRON HYDROCHLORIDE 2 MG/ML
4 INJECTION, SOLUTION INTRAVENOUS
Status: DISCONTINUED | OUTPATIENT
Start: 2025-07-31 | End: 2025-07-31

## 2025-07-31 RX ORDER — FENOFIBRATE 48 MG/1
48 TABLET, FILM COATED ORAL DAILY
Status: DISCONTINUED | OUTPATIENT
Start: 2025-07-31 | End: 2025-08-01

## 2025-07-31 RX ORDER — FENOFIBRATE 160 MG/1
160 TABLET ORAL DAILY
Status: DISCONTINUED | OUTPATIENT
Start: 2025-07-31 | End: 2025-07-31

## 2025-07-31 RX ORDER — METFORMIN HYDROCHLORIDE 500 MG/1
TABLET ORAL
Status: CANCELLED | OUTPATIENT
Start: 2025-07-31

## 2025-07-31 RX ORDER — DROPERIDOL 2.5 MG/ML
1.25 INJECTION, SOLUTION INTRAMUSCULAR; INTRAVENOUS
Status: COMPLETED | OUTPATIENT
Start: 2025-07-31 | End: 2025-07-31

## 2025-07-31 RX ORDER — PROPOFOL 10 MG/ML
VIAL (ML) INTRAVENOUS
Status: DISCONTINUED | OUTPATIENT
Start: 2025-07-31 | End: 2025-07-31

## 2025-07-31 RX ORDER — SUCCINYLCHOLINE CHLORIDE 20 MG/ML
INJECTION INTRAMUSCULAR; INTRAVENOUS
Status: DISCONTINUED | OUTPATIENT
Start: 2025-07-31 | End: 2025-07-31

## 2025-07-31 RX ORDER — LIDOCAINE HYDROCHLORIDE 20 MG/ML
INJECTION INTRAVENOUS
Status: DISCONTINUED | OUTPATIENT
Start: 2025-07-31 | End: 2025-07-31

## 2025-07-31 RX ORDER — HYDROCODONE BITARTRATE AND ACETAMINOPHEN 500; 5 MG/1; MG/1
TABLET ORAL
Status: DISCONTINUED | OUTPATIENT
Start: 2025-07-31 | End: 2025-07-31

## 2025-07-31 RX ORDER — ONDANSETRON HYDROCHLORIDE 2 MG/ML
INJECTION, SOLUTION INTRAVENOUS
Status: DISCONTINUED | OUTPATIENT
Start: 2025-07-31 | End: 2025-07-31

## 2025-07-31 RX ORDER — PANTOPRAZOLE SODIUM 40 MG/1
40 TABLET, DELAYED RELEASE ORAL DAILY
Status: CANCELLED | OUTPATIENT
Start: 2025-07-31

## 2025-07-31 RX ORDER — DEXAMETHASONE SODIUM PHOSPHATE 4 MG/ML
INJECTION, SOLUTION INTRA-ARTICULAR; INTRALESIONAL; INTRAMUSCULAR; INTRAVENOUS; SOFT TISSUE
Status: DISCONTINUED | OUTPATIENT
Start: 2025-07-31 | End: 2025-07-31

## 2025-07-31 RX ORDER — LORAZEPAM 2 MG/ML
1 INJECTION INTRAMUSCULAR DAILY PRN
Status: COMPLETED | OUTPATIENT
Start: 2025-07-31 | End: 2025-07-31

## 2025-07-31 RX ORDER — CEFTRIAXONE 2 G/1
2 INJECTION, POWDER, FOR SOLUTION INTRAMUSCULAR; INTRAVENOUS
Status: COMPLETED | OUTPATIENT
Start: 2025-07-31 | End: 2025-07-31

## 2025-07-31 RX ORDER — OCTREOTIDE ACETATE 100 UG/ML
50 INJECTION, SOLUTION INTRAVENOUS; SUBCUTANEOUS
Status: COMPLETED | OUTPATIENT
Start: 2025-07-31 | End: 2025-07-31

## 2025-07-31 RX ADMIN — LIDOCAINE HYDROCHLORIDE 100 MG: 20 INJECTION INTRAVENOUS at 03:07

## 2025-07-31 RX ADMIN — PANTOPRAZOLE SODIUM 80 MG: 40 INJECTION, POWDER, LYOPHILIZED, FOR SOLUTION INTRAVENOUS at 05:07

## 2025-07-31 RX ADMIN — FENTANYL CITRATE 50 MCG: 50 INJECTION, SOLUTION INTRAMUSCULAR; INTRAVENOUS at 03:07

## 2025-07-31 RX ADMIN — OCTREOTIDE ACETATE 50 MCG/HR: 500 INJECTION, SOLUTION INTRAVENOUS; SUBCUTANEOUS at 05:07

## 2025-07-31 RX ADMIN — DEXAMETHASONE SODIUM PHOSPHATE 4 MG: 4 INJECTION, SOLUTION INTRAMUSCULAR; INTRAVENOUS at 03:07

## 2025-07-31 RX ADMIN — LORAZEPAM 1 MG: 2 INJECTION INTRAMUSCULAR; INTRAVENOUS at 08:07

## 2025-07-31 RX ADMIN — SODIUM CHLORIDE: 9 INJECTION, SOLUTION INTRAVENOUS at 05:07

## 2025-07-31 RX ADMIN — ONDANSETRON 4 MG: 2 INJECTION INTRAMUSCULAR; INTRAVENOUS at 03:07

## 2025-07-31 RX ADMIN — ONDANSETRON 4 MG: 2 INJECTION INTRAMUSCULAR; INTRAVENOUS at 05:07

## 2025-07-31 RX ADMIN — ONDANSETRON HYDROCHLORIDE 4 MG: 2 INJECTION, SOLUTION INTRAVENOUS at 05:07

## 2025-07-31 RX ADMIN — ATORVASTATIN CALCIUM 40 MG: 40 TABLET, FILM COATED ORAL at 08:07

## 2025-07-31 RX ADMIN — SODIUM CHLORIDE: 0.9 INJECTION, SOLUTION INTRAVENOUS at 03:07

## 2025-07-31 RX ADMIN — PAROXETINE HYDROCHLORIDE 40 MG: 10 TABLET, FILM COATED ORAL at 08:07

## 2025-07-31 RX ADMIN — OCTREOTIDE ACETATE 50 MCG: 100 INJECTION, SOLUTION INTRAVENOUS; SUBCUTANEOUS at 05:07

## 2025-07-31 RX ADMIN — PANTOPRAZOLE SODIUM 40 MG: 40 INJECTION, POWDER, FOR SOLUTION INTRAVENOUS at 09:07

## 2025-07-31 RX ADMIN — EPHEDRINE SULFATE 15 MG: 50 INJECTION INTRAVENOUS at 03:07

## 2025-07-31 RX ADMIN — PROPOFOL 180 MG: 10 INJECTION, EMULSION INTRAVENOUS at 03:07

## 2025-07-31 RX ADMIN — DROPERIDOL 1.25 MG: 2.5 INJECTION, SOLUTION INTRAMUSCULAR; INTRAVENOUS at 05:07

## 2025-07-31 RX ADMIN — CEFTRIAXONE 2 G: 2 INJECTION, POWDER, FOR SOLUTION INTRAMUSCULAR; INTRAVENOUS at 07:07

## 2025-07-31 RX ADMIN — SUCCINYLCHOLINE CHLORIDE 200 MG: 20 INJECTION, SOLUTION INTRAMUSCULAR; INTRAVENOUS; PARENTERAL at 03:07

## 2025-07-31 RX ADMIN — PANTOPRAZOLE SODIUM 8 MG/HR: 40 INJECTION, POWDER, LYOPHILIZED, FOR SOLUTION INTRAVENOUS at 05:07

## 2025-07-31 RX ADMIN — FENOFIBRATE 160 MG: 160 TABLET ORAL at 09:07

## 2025-07-31 NOTE — SUBJECTIVE & OBJECTIVE
Past Medical History:   Diagnosis Date    Allergy     Anxiety     Arthritis     Depression     GERD (gastroesophageal reflux disease)     Hyperlipidemia     Hypertension     Tobacco abuse     Vitamin D deficiency disease        Past Surgical History:   Procedure Laterality Date    child birth      OPEN REDUCTION AND INTERNAL FIXATION (ORIF) OF FRACTURE OF PROXIMAL HUMERUS Left 6/28/2018    Procedure: ORIF, HUMERUS, PROXIMAL- synthes- left- c arm;  Surgeon: Quinn Parker MD;  Location: [address];  Service: Orthopedics;  Laterality: Left;    ORIF HUMERUS FRACTURE Left 6/28/2018    Procedure: ORIF, FRACTURE, HUMERUS - shaft;  Surgeon: Quinn Parker MD;  Location: [address];  Service: Orthopedics;  Laterality: Left;       Review of patient's allergies indicates:  No Known Allergies    No current facility-administered medications on file prior to encounter.     Current Outpatient Medications on File Prior to Encounter   Medication Sig    amLODIPine (NORVASC) 10 MG tablet Take 1 tablet (10 mg total) by mouth once daily.    aspirin 325 MG tablet Take 1 tablet (325 mg total) by mouth once daily.    atorvastatin (LIPITOR) 40 MG tablet Take 1 tablet (40 mg total) by mouth once daily.    blood pressure test kit-large Kit 1 each by Misc.(Non-Drug; Combo Route) route 2 (two) times a day.    clonazePAM (KLONOPIN) 1 MG tablet Take 1 tablet (1 mg total) by mouth every evening.    ergocalciferol (ERGOCALCIFEROL) 50,000 unit Cap Take 1 capsule (50,000 Units total) by mouth every 7 days.    fenofibrate 160 MG Tab TAKE 1 TABLET(160 MG) BY MOUTH DAILY AS NEEDED    gabapentin (NEURONTIN) 300 MG capsule TAKE 1 CAPSULE(300 MG) BY MOUTH THREE TIMES DAILY    losartan-hydrochlorothiazide 100-25 mg (HYZAAR) 100-25 mg per tablet Take 1 tablet by mouth once daily.    metFORMIN (GLUCOPHAGE) 500 MG tablet TAKE 1 TABLET BY MOUTH EVERY DAY WITH BREAKFAST    metoprolol succinate (TOPROL-XL) 100 MG 24 hr tablet Take 1 tablet (100  mg total) by mouth once daily.    MULTIVITS,CA,MINERALS/IRON/FA (ONE-A-DAY WOMENS FORMULA ORAL) Take 1 tablet by mouth once daily.    omega-3 fatty acids-vitamin E (FISH OIL) 1,000 mg Cap Take 2 capsules by mouth once daily.    omeprazole (PRILOSEC) 40 MG capsule Take 1 capsule (40 mg total) by mouth every morning.    paroxetine (PAXIL) 40 MG tablet TAKE 1 TABLET(40 MG) BY MOUTH DAILY    triamcinolone acetonide 0.1% (KENALOG) 0.1 % ointment Apply 1 application  topically 2 (two) times daily. Apply to affected area    walker (ULTRA-LIGHT ROLLATOR) Misc 1 each by Misc.(Non-Drug; Combo Route) route once daily.    [DISCONTINUED] cetirizine (ZYRTEC) 10 MG tablet Take 1 tablet (10 mg total) by mouth every evening. (Patient not taking: Reported on 9/1/2020)    [DISCONTINUED] citalopram (CELEXA) 40 MG tablet TAKE 1 TABLET BY MOUTH EVERY DAY (Patient not taking: Reported on 9/1/2020)    [DISCONTINUED] fenofibrate 160 MG Tab Take 1 tablet (160 mg total) by mouth once daily.    [DISCONTINUED] fenofibrate 160 MG Tab Po daily    [DISCONTINUED] HYDROcodone-acetaminophen (NORCO)  mg per tablet Take 1 tablet by mouth every 4 to 6 hours as needed.    [DISCONTINUED] hydrocortisone 2.5 % ointment Apply topically 2 (two) times daily. To affected area (Patient not taking: Reported on 9/1/2020)    [DISCONTINUED] ketoconazole (NIZORAL) 2 % shampoo Apply topically twice a week. (Patient not taking: Reported on 9/1/2020)    [DISCONTINUED] losartan-hydrochlorothiazide 100-25 mg (HYZAAR) 100-25 mg per tablet Take 1 tablet by mouth every morning.    [DISCONTINUED] losartan-hydrochlorothiazide 100-25 mg (HYZAAR) 100-25 mg per tablet Take 1 tablet by mouth every morning.    [DISCONTINUED] losartan-hydrochlorothiazide 100-25 mg (HYZAAR) 100-25 mg per tablet Take 1 tablet by mouth once daily.    [DISCONTINUED] meclizine (ANTIVERT) 25 mg tablet Take 1 tablet (25 mg total) by mouth 3 (three) times daily as needed for Nausea. (Patient not  taking: Reported on 9/1/2020)    [DISCONTINUED] metoprolol succinate (TOPROL-XL) 50 MG 24 hr tablet Take 1 tablet (50 mg total) by mouth once daily.    [DISCONTINUED] metoprolol succinate (TOPROL-XL) 50 MG 24 hr tablet TAKE 1 TABLET BY MOUTH EVERY DAY    [DISCONTINUED] omeprazole (PRILOSEC) 20 MG capsule TAKE ONE CAPSULE BY MOUTH EVERY DAY    [DISCONTINUED] omeprazole (PRILOSEC) 40 MG capsule TAKE 1 CAPSULE BY MOUTH EVERY DAY    [DISCONTINUED] omeprazole (PRILOSEC) 40 MG capsule TAKE ONE CAPSULE BY MOUTH EVERY DAY.    [DISCONTINUED] sulfamethoxazole-trimethoprim 800-160mg (BACTRIM DS) 800-160 mg Tab Take 1 tablet by mouth 2 (two) times daily.    [DISCONTINUED] traMADoL (ULTRAM) 50 mg tablet Take 1 tablet (50 mg total) by mouth every 12 (twelve) hours as needed.     Family History       Problem Relation (Age of Onset)    Heart disease Mother    Hypertension Mother    Skin cancer Mother          Tobacco Use    Smoking status: Every Day     Current packs/day: 1.50     Types: Cigarettes    Smokeless tobacco: Never   Substance and Sexual Activity    Alcohol use: Yes     Comment: 12 pack every 3 days    Drug use: No    Sexual activity: Yes     Partners: Male     Review of Systems   Constitutional:  Negative for chills, diaphoresis, fatigue and fever.   HENT:  Negative for nosebleeds and sore throat.    Respiratory:  Negative for apnea, cough, choking and shortness of breath.    Gastrointestinal:  Positive for abdominal pain, diarrhea and vomiting. Negative for abdominal distention, anal bleeding, blood in stool, constipation, nausea and rectal pain.   Genitourinary:  Negative for difficulty urinating and flank pain.   Musculoskeletal:  Negative for arthralgias, back pain, joint swelling and myalgias.   Neurological:  Positive for headaches.     Objective:     Vital Signs (Most Recent):  Temp: 98.1 °F (36.7 °C) (07/31/25 0700)  Pulse: 69 (07/31/25 0800)  Resp: (!) 22 (07/31/25 0700)  BP: (!) 191/84 (07/31/25 0800)  SpO2:  100 % (07/31/25 0800) Vital Signs (24h Range):  Temp:  [97.7 °F (36.5 °C)-98.2 °F (36.8 °C)] 98.1 °F (36.7 °C)  Pulse:  [63-75] 69  Resp:  [18-22] 22  SpO2:  [94 %-100 %] 100 %  BP: ()/(42-91) 191/84     Weight: 98.9 kg (218 lb)  Body mass index is 34.14 kg/m².     Physical Exam  Constitutional:       General: She is not in acute distress.     Appearance: Normal appearance. She is obese. She is not ill-appearing.   HENT:      Head: Normocephalic and atraumatic.      Mouth/Throat:      Mouth: Mucous membranes are dry.      Pharynx: No oropharyngeal exudate or posterior oropharyngeal erythema.   Eyes:      General: No scleral icterus.     Extraocular Movements: Extraocular movements intact.      Pupils: Pupils are equal, round, and reactive to light.   Cardiovascular:      Rate and Rhythm: Normal rate and regular rhythm.      Pulses: Normal pulses.      Heart sounds: Murmur heard.      No friction rub. No gallop.   Pulmonary:      Effort: Pulmonary effort is normal.      Breath sounds: Normal breath sounds.   Abdominal:      General: There is no distension.      Palpations: Abdomen is soft. There is no mass.      Tenderness: There is abdominal tenderness.      Hernia: No hernia is present.   Musculoskeletal:         General: No swelling or tenderness.      Right lower leg: No edema.      Left lower leg: No edema.   Skin:     Capillary Refill: Capillary refill takes less than 2 seconds.      Coloration: Skin is not jaundiced.   Neurological:      General: No focal deficit present.      Mental Status: She is alert and oriented to person, place, and time. Mental status is at baseline.              CRANIAL NERVES     CN III, IV, VI   Pupils are equal, round, and reactive to light.       Significant Labs: All pertinent labs within the past 24 hours have been reviewed.  CBC:   Recent Labs   Lab 07/31/25  0513   WBC 10.62   HGB 8.1*   HCT 24.8*          Significant Imaging: I have reviewed all pertinent imaging  results/findings within the past 24 hours.

## 2025-07-31 NOTE — HPI
Shannon Coleman is a 65 y.o. female with history of HTN, HLD, GERD, anxiety, prediabetes, and current smoker who presented to the ED this AM for hematemesis. Pt states she had 2 large volume episodes around 10:30pm last night. Has been taking goodie powder 2-3 times per week for the past few months and drank 6 beers last night. Denies other NSAID use, blood thinner use. Drinks 3-6 beers in a sitting 2-3 times per week. Denies any hematochezia or melanic stools. Pt has epigastric pain that is baseline for her, related to longstanding GERD. Denies chest pain, SOB, cough. Has been taking omeprazole for several years for GERD, however has run out of most of her medications for the past few weeks due to PCP change. No prior EGDs. Had a colonoscopy in 2011 without findings per pt. Had one additional episode of large volume hematemesis in the ED.    GI consulted for hematemesis.    Upon arrival to the ED, VSS and AF. At 0500 became diaphoretic and hypotensive to 78/42 with pulse 69, satting 98% RA. Received 1u pRBC shortly after that, as well as octreotide and pantoprazole 80mg IV. Vitals at 0630 hypertensive to 170/91, pulse 63, satting 100% 1L NC. Initial labs significant for Hgb 8.1, WBC WNL, Na 128, BUN 27, Cr 2.0 (baseline 1.4), normal PT/INR 11.4/1.0. CT A/P pending. Chest xray without acute process. CARLOS EDUARDO in ED without hematochezia or melena.

## 2025-07-31 NOTE — ED NOTES
Javed Delgadillo MD aware of pts BP of 85/50. Pt reports lightheadedness and sweating. Pt denies CP or SOB at this time.

## 2025-07-31 NOTE — PHARMACY MED REC
"        Admission Medication History     The home medication history was taken by Teetee Rosales.    You may go to "Admission" then "Reconcile Home Medications" tabs to review and/or act upon these items.     The home medication list has been updated by the Pharmacy department.   Please read ALL comments highlighted in yellow.   Please address this information as you see fit.    Feel free to contact us if you have any questions or require assistance.      The medications listed below were removed from the home medication list. Please reorder if appropriate:  Patient reports no longer taking the following medication(s):  Asprin   Celexa   Zyrtec  Gabapentin   Norco  Nizoral   Meclizine   Prilosec   Bactrim       Medications listed below were obtained from: Patient/family and Analytic software- [employer] Medications   Medication Sig    amLODIPine (NORVASC) 10 MG tablet Take 1 Tablet (10 Mg Total) By Mouth Once Daily.      atorvastatin (LIPITOR) 40 MG tablet Take 1 Tablet (40 Mg Total) By Mouth Once Daily.      clonazePAM (KLONOPIN) 1 MG tablet Take 1 Tablet (1 Mg Total) By Mouth Every Evening.      ergocalciferol (ERGOCALCIFEROL) 50,000 unit Cap Take 1 Capsule (50,000 Units Total) By Mouth Every 7 Days. Wednesday      fenofibrate 160 MG Tab Take 1 Tablet (160 Mg total) By Mouth Daily      losartan-hydrochlorothiazide 100-25 mg (HYZAAR) 100-25 mg per tablet Take 1 Tablet By Mouth Once Daily.      metFORMIN (GLUCOPHAGE) 500 MG tablet Take 1 Tablet By Mouth Every Day With Breakfast      metoprolol succinate (TOPROL-XL) 100 MG 24 hr tablet Take 1 Tablet (100 Mg Total) By Mouth Once Daily.      MULTIVITS,CA,MINERALS/IRON/FA (ONE-A-DAY WOMENS FORMULA ORAL) Take 1 Tablet By Mouth Once Daily.      omega-3 fatty acids-vitamin E (FISH OIL) 1,000 mg Cap Take 2 Capsules By Mouth Once Daily.      omeprazole (PRILOSEC) 40 MG capsule Take 1 Capsule (40 Mg Total) By Mouth Every Morning.      paroxetine (PAXIL) 40 MG " tablet Take 1 Tablet(40 Mg Total) By Mouth Daily      triamcinolone acetonide 0.1% (KENALOG) 0.1 % ointment Apply 1 Application Topically 2 (Two) Times Daily. Apply To Affected Area       Teetee Rosales  [id number]          .

## 2025-07-31 NOTE — ASSESSMENT & PLAN NOTE
Patient's blood pressure range in the last 24 hours was: BP  Min: 78/42  Max: 196/81.The patient's inpatient anti-hypertensive regimen is listed below:  Current Antihypertensives       Plan  - BP is controlled, no changes needed to their regimen  - will add on home antihypertensive medications as indicated

## 2025-07-31 NOTE — INTERVAL H&P NOTE
The patient has been examined and the H&P has been reviewed:    Pre-Procedure H and P Addendum    Patient seen and examined.  History and exam unchanged from prior history and physical.      Procedure: EGD  Indication: Hematemesis   ASA Class: per anesthesiology  Airway: normal  Neck Mobility: full range of motion  Mallampatti score: per anesthesia  History of anesthesia problems: no  Family history of anesthesia problems: no  Anesthesia Plan: MAC    Active Hospital Problems    Diagnosis  POA    *GIB (gastrointestinal bleeding) [K92.2]  Yes    Anemia [D64.9]  Yes    Tobacco dependency [F17.200]  Yes    Hemoptysis [R04.2]  Yes    Pre-diabetes [R73.03]  Yes    Acute blood loss anemia [D62]  Yes    Hyponatremia [E87.1]  Yes    Depression [F32.A]  Yes    Tobacco use disorder [F17.200]  Yes    HTN (hypertension), benign [I10]  Yes    Hyperlipidemia [E78.5]  Yes    Anxiety [F41.9]  Yes      Resolved Hospital Problems   No resolved problems to display.

## 2025-07-31 NOTE — ASSESSMENT & PLAN NOTE
Dangers of cigarette smoking were reviewed with patient in detail. Patient was Referred to Tobacco Cessation Program. Nicotine replacement options were discussed. Nicotine replacement was discussed- not prescribed per patient's request but we ordered it PRN should she need it

## 2025-07-31 NOTE — ASSESSMENT & PLAN NOTE
Shannon Coleman is a 64 y/o female with a PMH of moderate AS noted on previous echo, HLD, HTN, pre-diabetes, GERD, tobacco and EtOH use presenting to [facility] ED for hematemesis.    Patient's hemorrhage is due to gastrointestinal bleed, this bleeding is not associated with a medication or a coagulopathy. Patients most recent Hgb, Hct, platelets, and INR are listed below.  Recent Labs     07/31/25  0513   HGB 8.1*   HCT 24.8*      INR 1.0     Plan  - Will trend hemoglobin/hematocrit Daily  - Will monitor and correct any coagulation defects  - Will transfuse if Hgb is <7g/dl (<8g/dl in cases of active ACS) or if patient has rapid bleeding leading to hemodynamic instability  - has been given 1 unit of pRBCs to date  - GI to scope this pm  - holding VTE prophylaxis until scope is done  - NPO until procedure  - protonix 40mg IV pushes until scope

## 2025-07-31 NOTE — MEDICAL/APP STUDENT
"[facility]  Gastroenterology  Progress Note    Patient Name: Shannon Coleman  MRN: [id number]  Admission Date: 7/31/2025  Hospital Length of Stay: 0 days    Subjective:     HPI:   Shannon Coleman is a 65 y.o. female with history of HTN, HLD, GERD, anxiety, prediabetes, and current smoker who presented to the ED this AM for hematemesis. Pt states she had 2 large volume episodes around 10:30pm last night. Has been taking goodie powder 2-3 times per week for the past few months and drank 6 beers last night. Denies other NSAID use, blood thinner use. Drinks 3-6 beers in a sitting 2-3 times per week. Denies any hematochezia or melanic stools. Pt has epigastric pain that is baseline for her, related to longstanding GERD. Denies chest pain, SOB, cough. Has been taking omeprazole for several years for GERD, however has run out of most of her medications for the past few weeks due to PCP change. No prior EGDs. Had a colonoscopy in 2011 without findings per pt. Had one additional episode of large volume hematemesis in the ED.     GI consulted for hematemesis.     Upon arrival to the ED, VSS and AF. At 0500 became diaphoretic and hypotensive to 78/42 with pulse 69, satting 98% RA. Received 1u pRBC shortly after that, as well as octreotide and pantoprazole 80mg IV. Vitals at 0630 hypertensive to 170/91, pulse 63, satting 100% 1L NC. Initial labs significant for Hgb 8.1 (baseline 11), WBC WNL, Na 128, BUN 27, Cr 2.0 (baseline 1.4), normal PT/INR 11.4/1.0. CT A/P refused due to anxiety. Chest xray without acute process. CARLOS EDUARDO in ED without hematochezia or melena.            Medications Ordered Prior to Encounter[1]    Review of patient's allergies indicates:  No Known Allergies      Objective:     Vitals:    07/31/25 0800   BP: (!) 191/84   Pulse: 69   Resp:    Temp:          Constitutional: {EXAM; CONSTITUTIONAL:50004::" not in acute distress","well developed"}  HENT: {Exam; heent:61799::"Head: Normal, " "normocephalic, atraumatic."}  Eyes: {Peds Eye Exam:20217::"conjunctiva clear","sclera nonicteric"}  Cardiovascular: {ED EXAM CARDIOVASCULAR:91760050::"regular rate and rhythm","no murmur"}  Respiratory: {PE RESPIRATORY-IM:56465::"normal chest expansion & respiratory effort  ","no accessory muscle use"}  GI: {Exam; abdomen:20693::"soft, non-tender, without masses or organomegaly"}  Musculoskeletal: {msk exam:041861::"no muscular tenderness noted"}  Skin: {dhskin:33719::"normal color"}  Neurological: {NEURO BRIEF:66035::"alert, oriented x3"}  Psychiatric: {Exam; psych:40685::"mood and affect are within normal limits","pt is a good historian; no memory problems were noted"}  Rectal: ***    Significant Labs:  Recent Labs   Lab 07/31/25  0513   HGB 8.1*       Lab Results   Component Value Date    WBC 10.62 07/31/2025    HGB 8.1 (L) 07/31/2025    HCT 24.8 (L) 07/31/2025    MCV 95 07/31/2025     07/31/2025       Lab Results   Component Value Date     (L) 07/31/2025    K 3.5 07/31/2025     07/31/2025    CO2 14 (L) 07/31/2025    BUN 27 (H) 07/31/2025    CREATININE 2.0 (H) 07/31/2025    CALCIUM 8.2 (L) 07/31/2025    ANIONGAP 11 07/31/2025    ESTGFRAFRICA >60.0 06/29/2018    EGFRNONAA 44 (L) 12/09/2021       Lab Results   Component Value Date    ALT 14 07/31/2025    AST 25 07/31/2025    ALKPHOS 31 (L) 07/31/2025    BILITOT 0.2 07/31/2025       Lab Results   Component Value Date    INR 1.0 07/31/2025       Significant Imaging:  Reviewed pertinent radiology findings.       Assessment/Plan:     Shannon Coleman is a 65 y.o. female with history of ***    Problem List:  Hematemesis  Acute anemia    ***    Recommendations:    ***    Thank you for involving us in the care of Shannon Coleman. Please call with any additional questions, concerns or changes in the patient's clinical status. ***We will continue to follow. ***We will sign off.    Fritz Adame       [1]   No current facility-administered medications on file " prior to encounter.     Current Outpatient Medications on File Prior to Encounter   Medication Sig Dispense Refill    amLODIPine (NORVASC) 10 MG tablet Take 1 tablet (10 mg total) by mouth once daily. 90 tablet 1    aspirin 325 MG tablet Take 1 tablet (325 mg total) by mouth once daily. 90 tablet 0    atorvastatin (LIPITOR) 40 MG tablet Take 1 tablet (40 mg total) by mouth once daily. 90 tablet 1    blood pressure test kit-large Kit 1 each by Misc.(Non-Drug; Combo Route) route 2 (two) times a day. 1 each 0    clonazePAM (KLONOPIN) 1 MG tablet Take 1 tablet (1 mg total) by mouth every evening. 30 tablet 1    ergocalciferol (ERGOCALCIFEROL) 50,000 unit Cap Take 1 capsule (50,000 Units total) by mouth every 7 days. 12 capsule 3    fenofibrate 160 MG Tab TAKE 1 TABLET(160 MG) BY MOUTH DAILY AS NEEDED 90 tablet 1    gabapentin (NEURONTIN) 300 MG capsule TAKE 1 CAPSULE(300 MG) BY MOUTH THREE TIMES DAILY 90 capsule 1    losartan-hydrochlorothiazide 100-25 mg (HYZAAR) 100-25 mg per tablet Take 1 tablet by mouth once daily. 30 tablet 6    metFORMIN (GLUCOPHAGE) 500 MG tablet TAKE 1 TABLET BY MOUTH EVERY DAY WITH BREAKFAST 90 tablet 1    metoprolol succinate (TOPROL-XL) 100 MG 24 hr tablet Take 1 tablet (100 mg total) by mouth once daily. 90 tablet 1    MULTIVITS,CA,MINERALS/IRON/FA (ONE-A-DAY WOMENS FORMULA ORAL) Take 1 tablet by mouth once daily.      omega-3 fatty acids-vitamin E (FISH OIL) 1,000 mg Cap Take 2 capsules by mouth once daily. 30 each 4    omeprazole (PRILOSEC) 40 MG capsule Take 1 capsule (40 mg total) by mouth every morning. 90 capsule 1    paroxetine (PAXIL) 40 MG tablet TAKE 1 TABLET(40 MG) BY MOUTH DAILY 90 tablet 0    triamcinolone acetonide 0.1% (KENALOG) 0.1 % ointment Apply 1 application  topically 2 (two) times daily. Apply to affected area 30 g 1    walker (ULTRA-LIGHT ROLLATOR) Misc 1 each by Misc.(Non-Drug; Combo Route) route once daily. 1 each 0    [DISCONTINUED] cetirizine (ZYRTEC) 10 MG tablet  Take 1 tablet (10 mg total) by mouth every evening. (Patient not taking: Reported on 9/1/2020) 30 tablet 1    [DISCONTINUED] citalopram (CELEXA) 40 MG tablet TAKE 1 TABLET BY MOUTH EVERY DAY (Patient not taking: Reported on 9/1/2020) 30 tablet 2    [DISCONTINUED] fenofibrate 160 MG Tab Take 1 tablet (160 mg total) by mouth once daily. 30 tablet 3    [DISCONTINUED] fenofibrate 160 MG Tab Po daily 30 tablet 3    [DISCONTINUED] HYDROcodone-acetaminophen (NORCO)  mg per tablet Take 1 tablet by mouth every 4 to 6 hours as needed. 60 tablet 0    [DISCONTINUED] hydrocortisone 2.5 % ointment Apply topically 2 (two) times daily. To affected area (Patient not taking: Reported on 9/1/2020) 20 g 0    [DISCONTINUED] ketoconazole (NIZORAL) 2 % shampoo Apply topically twice a week. (Patient not taking: Reported on 9/1/2020) 120 mL 1    [DISCONTINUED] losartan-hydrochlorothiazide 100-25 mg (HYZAAR) 100-25 mg per tablet Take 1 tablet by mouth every morning. 30 tablet 3    [DISCONTINUED] losartan-hydrochlorothiazide 100-25 mg (HYZAAR) 100-25 mg per tablet Take 1 tablet by mouth every morning. 30 tablet 3    [DISCONTINUED] losartan-hydrochlorothiazide 100-25 mg (HYZAAR) 100-25 mg per tablet Take 1 tablet by mouth once daily. 90 tablet 1    [DISCONTINUED] meclizine (ANTIVERT) 25 mg tablet Take 1 tablet (25 mg total) by mouth 3 (three) times daily as needed for Nausea. (Patient not taking: Reported on 9/1/2020) 7 tablet 0    [DISCONTINUED] metoprolol succinate (TOPROL-XL) 50 MG 24 hr tablet Take 1 tablet (50 mg total) by mouth once daily. 60 tablet 1    [DISCONTINUED] metoprolol succinate (TOPROL-XL) 50 MG 24 hr tablet TAKE 1 TABLET BY MOUTH EVERY DAY 90 tablet 1    [DISCONTINUED] omeprazole (PRILOSEC) 20 MG capsule TAKE ONE CAPSULE BY MOUTH EVERY DAY 30 capsule 3    [DISCONTINUED] omeprazole (PRILOSEC) 40 MG capsule TAKE 1 CAPSULE BY MOUTH EVERY DAY 90 capsule 1    [DISCONTINUED] omeprazole (PRILOSEC) 40 MG capsule TAKE ONE  CAPSULE BY MOUTH EVERY DAY. 90 capsule 0    [DISCONTINUED] sulfamethoxazole-trimethoprim 800-160mg (BACTRIM DS) 800-160 mg Tab Take 1 tablet by mouth 2 (two) times daily. 20 tablet 0    [DISCONTINUED] traMADoL (ULTRAM) 50 mg tablet Take 1 tablet (50 mg total) by mouth every 12 (twelve) hours as needed. 30 tablet 0

## 2025-07-31 NOTE — SUBJECTIVE & OBJECTIVE
Past Medical History:   Diagnosis Date    Allergy     Anxiety     Arthritis     Depression     GERD (gastroesophageal reflux disease)     Hyperlipidemia     Hypertension     Tobacco abuse     Vitamin D deficiency disease        Past Surgical History:   Procedure Laterality Date    child birth      OPEN REDUCTION AND INTERNAL FIXATION (ORIF) OF FRACTURE OF PROXIMAL HUMERUS Left 6/28/2018    Procedure: ORIF, HUMERUS, PROXIMAL- synthes- left- c arm;  Surgeon: Quinn Parker MD;  Location: [address];  Service: Orthopedics;  Laterality: Left;    ORIF HUMERUS FRACTURE Left 6/28/2018    Procedure: ORIF, FRACTURE, HUMERUS - shaft;  Surgeon: Quinn Parker MD;  Location: [address];  Service: Orthopedics;  Laterality: Left;       Review of patient's allergies indicates:  No Known Allergies  Family History       Problem Relation (Age of Onset)    Heart disease Mother    Hypertension Mother    Skin cancer Mother          Tobacco Use    Smoking status: Every Day     Current packs/day: 1.50     Types: Cigarettes    Smokeless tobacco: Never   Substance and Sexual Activity    Alcohol use: Yes     Comment: 12 pack every 3 days    Drug use: No    Sexual activity: Yes     Partners: Male     Review of Systems   Constitutional:  Negative for activity change, appetite change, chills, fatigue and fever.   Respiratory:  Negative for cough, choking and shortness of breath.    Cardiovascular:  Negative for chest pain and palpitations.   Gastrointestinal:  Positive for abdominal pain, nausea and vomiting. Negative for abdominal distention, constipation and diarrhea.   Genitourinary:  Negative for difficulty urinating and dysuria.   Musculoskeletal:  Negative for arthralgias.   Neurological:  Positive for dizziness, weakness and light-headedness. Negative for tremors and syncope.   Psychiatric/Behavioral:  Negative for agitation, behavioral problems and confusion.      Objective:     Vital Signs (Most Recent):  Temp: 98.1 °F  (36.7 °C) (07/31/25 0700)  Pulse: 72 (07/31/25 0900)  Resp: (!) 22 (07/31/25 0700)  BP: (!) 173/72 (07/31/25 0818)  SpO2: 97 % (07/31/25 0900) Vital Signs (24h Range):  Temp:  [97.7 °F (36.5 °C)-98.2 °F (36.8 °C)] 98.1 °F (36.7 °C)  Pulse:  [63-75] 72  Resp:  [18-22] 22  SpO2:  [94 %-100 %] 97 %  BP: ()/(42-91) 173/72     Weight: 98.9 kg (218 lb) (07/31/25 0438)  Body mass index is 34.14 kg/m².      Intake/Output Summary (Last 24 hours) at 7/31/2025 0934  Last data filed at 7/31/2025 0735  Gross per 24 hour   Intake 1035.37 ml   Output --   Net 1035.37 ml       Lines/Drains/Airways       Drain  Duration             Female External Urinary Catheter w/ Suction 07/31/25 0800 <1 day              Peripheral Intravenous Line  Duration             Peripheral IV Single Lumen 07/31/25 0443 18 G Right Antecubital <1 day    Peripheral IV Single Lumen 07/31/25 0445 20 G Left;Posterior Wrist <1 day    Peripheral IV Single Lumen 07/31/25 0525 16 G Left Antecubital <1 day                     Physical Exam  Constitutional:       General: She is not in acute distress.     Appearance: She is not ill-appearing or toxic-appearing.   HENT:      Head: Normocephalic and atraumatic.      Right Ear: External ear normal.      Left Ear: External ear normal.      Nose: Nose normal.      Mouth/Throat:      Mouth: Mucous membranes are dry.      Pharynx: Oropharynx is clear.   Eyes:      Extraocular Movements: Extraocular movements intact.      Conjunctiva/sclera: Conjunctivae normal.   Cardiovascular:      Rate and Rhythm: Normal rate and regular rhythm.      Heart sounds: Normal heart sounds.   Pulmonary:      Effort: Pulmonary effort is normal.   Abdominal:      General: Abdomen is flat. Bowel sounds are normal. There is no distension.      Palpations: Abdomen is soft. There is no mass.      Tenderness: There is abdominal tenderness in the epigastric area. There is no guarding.      Hernia: No hernia is present.   Musculoskeletal:          General: Normal range of motion.      Right lower leg: No edema.      Left lower leg: No edema.   Skin:     General: Skin is warm and dry.   Neurological:      General: No focal deficit present.      Mental Status: She is alert and oriented to person, place, and time.   Psychiatric:         Mood and Affect: Mood normal.         Behavior: Behavior normal.          Significant Labs:  CBC:   Recent Labs   Lab 07/31/25 0513   WBC 10.62   HGB 8.1*   HCT 24.8*        CMP:   Recent Labs   Lab 07/31/25 0513   *   CALCIUM 8.2*   ALBUMIN 3.1*   PROT 6.3   *   K 3.5   CO2 14*      BUN 27*   CREATININE 2.0*   ALKPHOS 31*   ALT 14   AST 25   BILITOT 0.2     Coagulation:   Recent Labs   Lab 07/31/25 0513   INR 1.0       Significant Imaging:  Imaging results within the past 24 hours have been reviewed.

## 2025-07-31 NOTE — H&P
"[facility] - Emergency Dept  [facility] Medicine  History & Physical    Patient Name: Shannon Coleman  MRN: [id number]  Patient Class: IP- Inpatient  Admission Date: 7/31/2025  Attending Physician: Elizabeth Gore MD   Primary Care Provider: Wagner Aragon MD         Patient information was obtained from patient, spouse/SO, and ER records.     Subjective:     Principal Problem:GIB (gastrointestinal bleeding)    Chief Complaint:   Chief Complaint   Patient presents with    Emesis     Hematemesis, BRB with clots. Also complaining of lower quadrant abd pain and diarrhea. Started around 2230 this evening.          HPI: Shannon Coleman is a 64 y/o female with a PMH of moderate AS noted on previous echo, HLD, HTN, pre-diabetes, GERD, depression, nasal skin cancer s/p removal, tobacco and EtOH use presenting to [facility] ED for hematemesis. She states that she had two episodes around 10:30 pm last night that were bright red and large volume, and one episode around her presentation to ED that was smaller. Before this occurred, she was drinking approximately 6 beers and was took 1 pack of BC powder for a headache. She then went to the bathroom do to nausea and began having emesis and had an episode of diarrhea after. She denies dizziness or syncope. She denies history of hematemesis or dark stools. She reports that she drinks "a couple beers" 3 times a week. She takes BC powder 3 times a week for headache. She smokes 1 pack a day and has a 30 pack year history. She denies CP, SOB, recent illness or sick contacts, numbness, and any known hx of cardiac, liver, or kidney problems. She recently lost her PCP due to them switching locations so is also beginning to run out her home medications.     In the ED: afebrile, BP 87/52, symptomatic. Hb 8.1. Na 128, CO2 14, BUN 27, Cr 2, glucose 129, ALP 31. Bili, AST, ALT, lipase wnl. Hep C, HIV negative. EKG showed NSR and prolonged QT (505s). CXR showed stable cardiomegaly and no focal " consolidation. CT AP noncon pending. Given 1 unit [facility].Given 2g ceftriaxone, droperidol, ondansetron, pantoprazole, and octreotide, and one dose of lorazepam for CT anxiety.     Past Medical History:   Diagnosis Date    Allergy     Anxiety     Arthritis     Depression     GERD (gastroesophageal reflux disease)     Hyperlipidemia     Hypertension     Tobacco abuse     Vitamin D deficiency disease        Past Surgical History:   Procedure Laterality Date    child birth      OPEN REDUCTION AND INTERNAL FIXATION (ORIF) OF FRACTURE OF PROXIMAL HUMERUS Left 6/28/2018    Procedure: ORIF, HUMERUS, PROXIMAL- synthes- left- c arm;  Surgeon: Quinn Parker MD;  Location: [address];  Service: Orthopedics;  Laterality: Left;    ORIF HUMERUS FRACTURE Left 6/28/2018    Procedure: ORIF, FRACTURE, HUMERUS - shaft;  Surgeon: Quinn Parker MD;  Location: [facility] OR [address] [place] [address];  Service: Orthopedics;  Laterality: Left;       Review of patient's allergies indicates:  No Known Allergies    No current facility-administered medications on file prior to encounter.     Current Outpatient Medications on File Prior to Encounter   Medication Sig    amLODIPine (NORVASC) 10 MG tablet Take 1 tablet (10 mg total) by mouth once daily.    aspirin 325 MG tablet Take 1 tablet (325 mg total) by mouth once daily.    atorvastatin (LIPITOR) 40 MG tablet Take 1 tablet (40 mg total) by mouth once daily.    blood pressure test kit-large Kit 1 each by Misc.(Non-Drug; Combo Route) route 2 (two) times a day.    clonazePAM (KLONOPIN) 1 MG tablet Take 1 tablet (1 mg total) by mouth every evening.    ergocalciferol (ERGOCALCIFEROL) 50,000 unit Cap Take 1 capsule (50,000 Units total) by mouth every 7 days.    fenofibrate 160 MG Tab TAKE 1 TABLET(160 MG) BY MOUTH DAILY AS NEEDED    gabapentin (NEURONTIN) 300 MG capsule TAKE 1 CAPSULE(300 MG) BY MOUTH THREE TIMES DAILY    losartan-hydrochlorothiazide 100-25 mg (HYZAAR) 100-25 mg per tablet Take 1 tablet by  mouth once daily.    metFORMIN (GLUCOPHAGE) 500 MG tablet TAKE 1 TABLET BY MOUTH EVERY DAY WITH BREAKFAST    metoprolol succinate (TOPROL-XL) 100 MG 24 hr tablet Take 1 tablet (100 mg total) by mouth once daily.    MULTIVITS,CA,MINERALS/IRON/FA (ONE-A-DAY WOMENS FORMULA ORAL) Take 1 tablet by mouth once daily.    omega-3 fatty acids-vitamin E (FISH OIL) 1,000 mg Cap Take 2 capsules by mouth once daily.    omeprazole (PRILOSEC) 40 MG capsule Take 1 capsule (40 mg total) by mouth every morning.    paroxetine (PAXIL) 40 MG tablet TAKE 1 TABLET(40 MG) BY MOUTH DAILY    triamcinolone acetonide 0.1% (KENALOG) 0.1 % ointment Apply 1 application  topically 2 (two) times daily. Apply to affected area    walker (ULTRA-LIGHT ROLLATOR) Misc 1 each by Misc.(Non-Drug; Combo Route) route once daily.    [DISCONTINUED] cetirizine (ZYRTEC) 10 MG tablet Take 1 tablet (10 mg total) by mouth every evening. (Patient not taking: Reported on 9/1/2020)    [DISCONTINUED] citalopram (CELEXA) 40 MG tablet TAKE 1 TABLET BY MOUTH EVERY DAY (Patient not taking: Reported on 9/1/2020)    [DISCONTINUED] fenofibrate 160 MG Tab Take 1 tablet (160 mg total) by mouth once daily.    [DISCONTINUED] fenofibrate 160 MG Tab Po daily    [DISCONTINUED] HYDROcodone-acetaminophen (NORCO)  mg per tablet Take 1 tablet by mouth every 4 to 6 hours as needed.    [DISCONTINUED] hydrocortisone 2.5 % ointment Apply topically 2 (two) times daily. To affected area (Patient not taking: Reported on 9/1/2020)    [DISCONTINUED] ketoconazole (NIZORAL) 2 % shampoo Apply topically twice a week. (Patient not taking: Reported on 9/1/2020)    [DISCONTINUED] losartan-hydrochlorothiazide 100-25 mg (HYZAAR) 100-25 mg per tablet Take 1 tablet by mouth every morning.    [DISCONTINUED] losartan-hydrochlorothiazide 100-25 mg (HYZAAR) 100-25 mg per tablet Take 1 tablet by mouth every morning.    [DISCONTINUED] losartan-hydrochlorothiazide 100-25 mg (HYZAAR) 100-25 mg per tablet Take  1 tablet by mouth once daily.    [DISCONTINUED] meclizine (ANTIVERT) 25 mg tablet Take 1 tablet (25 mg total) by mouth 3 (three) times daily as needed for Nausea. (Patient not taking: Reported on 9/1/2020)    [DISCONTINUED] metoprolol succinate (TOPROL-XL) 50 MG 24 hr tablet Take 1 tablet (50 mg total) by mouth once daily.    [DISCONTINUED] metoprolol succinate (TOPROL-XL) 50 MG 24 hr tablet TAKE 1 TABLET BY MOUTH EVERY DAY    [DISCONTINUED] omeprazole (PRILOSEC) 20 MG capsule TAKE ONE CAPSULE BY MOUTH EVERY DAY    [DISCONTINUED] omeprazole (PRILOSEC) 40 MG capsule TAKE 1 CAPSULE BY MOUTH EVERY DAY    [DISCONTINUED] omeprazole (PRILOSEC) 40 MG capsule TAKE ONE CAPSULE BY MOUTH EVERY DAY.    [DISCONTINUED] sulfamethoxazole-trimethoprim 800-160mg (BACTRIM DS) 800-160 mg Tab Take 1 tablet by mouth 2 (two) times daily.    [DISCONTINUED] traMADoL (ULTRAM) 50 mg tablet Take 1 tablet (50 mg total) by mouth every 12 (twelve) hours as needed.     Family History       Problem Relation (Age of Onset)    Heart disease Mother    Hypertension Mother    Skin cancer Mother          Tobacco Use    Smoking status: Every Day     Current packs/day: 1.50     Types: Cigarettes    Smokeless tobacco: Never   Substance and Sexual Activity    Alcohol use: Yes     Comment: 12 pack every 3 days    Drug use: No    Sexual activity: Yes     Partners: Male     Review of Systems   Constitutional:  Negative for chills, diaphoresis, fatigue and fever.   HENT:  Negative for nosebleeds and sore throat.    Respiratory:  Negative for apnea, cough, choking and shortness of breath.    Gastrointestinal:  Positive for abdominal pain, diarrhea and vomiting. Negative for abdominal distention, anal bleeding, blood in stool, constipation, nausea and rectal pain.   Genitourinary:  Negative for difficulty urinating and flank pain.   Musculoskeletal:  Negative for arthralgias, back pain, joint swelling and myalgias.   Neurological:  Positive for headaches.      Objective:     Vital Signs (Most Recent):  Temp: 98.1 °F (36.7 °C) (07/31/25 0700)  Pulse: 69 (07/31/25 0800)  Resp: (!) 22 (07/31/25 0700)  BP: (!) 191/84 (07/31/25 0800)  SpO2: 100 % (07/31/25 0800) Vital Signs (24h Range):  Temp:  [97.7 °F (36.5 °C)-98.2 °F (36.8 °C)] 98.1 °F (36.7 °C)  Pulse:  [63-75] 69  Resp:  [18-22] 22  SpO2:  [94 %-100 %] 100 %  BP: ()/(42-91) 191/84     Weight: 98.9 kg (218 lb)  Body mass index is 34.14 kg/m².     Physical Exam  Constitutional:       General: She is not in acute distress.     Appearance: Normal appearance. She is obese. She is not ill-appearing.   HENT:      Head: Normocephalic and atraumatic.      Mouth/Throat:      Mouth: Mucous membranes are dry.      Pharynx: No oropharyngeal exudate or posterior oropharyngeal erythema.   Eyes:      General: No scleral icterus.     Extraocular Movements: Extraocular movements intact.      Pupils: Pupils are equal, round, and reactive to light.   Cardiovascular:      Rate and Rhythm: Normal rate and regular rhythm.      Pulses: Normal pulses.      Heart sounds: Murmur heard.      No friction rub. No gallop.   Pulmonary:      Effort: Pulmonary effort is normal.      Breath sounds: Normal breath sounds.   Abdominal:      General: There is no distension.      Palpations: Abdomen is soft. There is no mass.      Tenderness: There is abdominal tenderness.      Hernia: No hernia is present.   Musculoskeletal:         General: No swelling or tenderness.      Right lower leg: No edema.      Left lower leg: No edema.   Skin:     Capillary Refill: Capillary refill takes less than 2 seconds.      Coloration: Skin is not jaundiced.   Neurological:      General: No focal deficit present.      Mental Status: She is alert and oriented to person, place, and time. Mental status is at baseline.              CRANIAL NERVES     CN III, IV, VI   Pupils are equal, round, and reactive to light.       Significant Labs: All pertinent labs within the  past 24 hours have been reviewed.  CBC:   Recent Labs   Lab 07/31/25  0513   WBC 10.62   HGB 8.1*   HCT 24.8*          Significant Imaging: I have reviewed all pertinent imaging results/findings within the past 24 hours.  Assessment/Plan:     Assessment & Plan  GIB (gastrointestinal bleeding)  Acute blood loss anemia  Anemia  Hemoptysis  Shannon Coleman is a 66 y/o female with a PMH of moderate AS noted on previous echo, HLD, HTN, pre-diabetes, GERD, tobacco and EtOH use presenting to [facility] ED for hematemesis.    Patient's hemorrhage is due to gastrointestinal bleed, this bleeding is not associated with a medication or a coagulopathy. Patients most recent Hgb, Hct, platelets, and INR are listed below.  Recent Labs     07/31/25  0513   HGB 8.1*   HCT 24.8*      INR 1.0     Plan  - Will trend hemoglobin/hematocrit Daily  - Will monitor and correct any coagulation defects  - Will transfuse if Hgb is <7g/dl (<8g/dl in cases of active ACS) or if patient has rapid bleeding leading to hemodynamic instability  - has been given 1 unit of pRBCs to date  - GI to scope this pm  - holding VTE prophylaxis until scope is done  - NPO until procedure  - protonix 40mg IV pushes until scope      HTN (hypertension), benign  Patient's blood pressure range in the last 24 hours was: BP  Min: 78/42  Max: 196/81.The patient's inpatient anti-hypertensive regimen is listed below:  Current Antihypertensives       Plan  - BP is controlled, no changes needed to their regimen  - will add on home antihypertensive medications as indicated  Hyperlipidemia  - atorvastatin 40mg daily    Anxiety  - was using klonopin at home for anxiety  - given 1mg of ativan prior to CT, will continue to monitor    Tobacco use disorder  - nicotine patch ordered PRN    Depression  Patient has persistent depression which is mild and is currently controlled. Will Continue anti-depressant medications. We will not consult psychiatry at this time. Patient  does not display psychosis at this time. Continue to monitor closely and adjust plan of care as needed.  - paroxetine 40mg daily      Hyponatremia  Hyponatremia is likely due to Dehydration/hypovolemia. The patient's most recent sodium results are listed below.  Recent Labs     07/31/25  0513   *       Plan  - Monitor sodium Daily   - Patient hyponatremia is stable  - Like due to hypovolemia, assessed fluid status will trend on future labs  Tobacco dependency  Dangers of cigarette smoking were reviewed with patient in detail. Patient was Referred to Tobacco Cessation Program. Nicotine replacement options were discussed. Nicotine replacement was discussed- not prescribed per patient's request but we ordered it PRN should she need it  Pre-diabetes  - last A1c 6.0  - holding metformin currently      VTE Risk Mitigation (From admission, onward)           Ordered     IP VTE HIGH RISK PATIENT  Once         07/31/25 0718     Place sequential compression device  Until discontinued         07/31/25 0718                           Fransisco Triana MD  Department of Hospital Medicine  [facility] - Emergency Dept

## 2025-07-31 NOTE — ASSESSMENT & PLAN NOTE
Hyponatremia is likely due to Dehydration/hypovolemia. The patient's most recent sodium results are listed below.  Recent Labs     07/31/25  0513   *       Plan  - Monitor sodium Daily   - Patient hyponatremia is stable  - Like due to hypovolemia, assessed fluid status will trend on future labs

## 2025-07-31 NOTE — HPI
"Shannon Coleman is a 66 y/o female with a PMH of moderate AS noted on previous echo, HLD, HTN, pre-diabetes, GERD, depression, nasal skin cancer s/p removal, tobacco and EtOH use presenting to [facility] ED for hematemesis. She states that she had two episodes around 10:30 pm last night that were bright red and large volume, and one episode around her presentation to ED that was smaller. Before this occurred, she was drinking approximately 6 beers and was took 1 pack of BC powder for a headache. She then went to the bathroom do to nausea and began having emesis and had an episode of diarrhea after. She denies dizziness or syncope. She denies history of hematemesis or dark stools. She reports that she drinks "a couple beers" 3 times a week. She takes BC powder 3 times a week for headache. She smokes 1 pack a day and has a 30 pack year history. She denies CP, SOB, recent illness or sick contacts, numbness, and any known hx of cardiac, liver, or kidney problems. She recently lost her PCP due to them switching locations so is also beginning to run out her home medications.     In the ED: afebrile, BP 87/52, symptomatic. Hb 8.1. Na 128, CO2 14, BUN 27, Cr 2, glucose 129, ALP 31. Bili, AST, ALT, lipase wnl. Hep C, HIV negative. EKG showed NSR and prolonged QT (505s). CXR showed stable cardiomegaly and no focal consolidation. CT AP noncon pending. Given 1 unit [facility].Given 2g ceftriaxone, droperidol, ondansetron, pantoprazole, and octreotide, and one dose of lorazepam for CT anxiety.   "

## 2025-07-31 NOTE — ASSESSMENT & PLAN NOTE
Patient has persistent depression which is mild and is currently controlled. Will Continue anti-depressant medications. We will not consult psychiatry at this time. Patient does not display psychosis at this time. Continue to monitor closely and adjust plan of care as needed.  - paroxetine 40mg daily

## 2025-07-31 NOTE — MEDICAL/APP STUDENT
"[facility] - Emergency [facility] Medicine  History & Physical    Patient Name: Shannon Coleman  MRN: [id number]  Patient Class: IP- Inpatient   Admission Date: 7/31/2025  Attending Physician: Elizabeth Gore MD   Primary Care Provider: Wagner Aragon MD        Patient information was obtained from patient, spouse/SO, relative(s), EMS personnel, past medical records, and ER records.     Subjective:     HPI: Shannon Coleman is a 66 y/o female with a pmh of HLD, HTN, pre-diabetes, GERD, depression, vitamin d deficiency, nasal skin cancer s/p removal, and tobacco and EtOH use presenting to [facility] ED for hematemesis. She states that she had two episodes around 10:30 pm last night that were bright red and large volume, and one episode around her presentation to ED that was smaller. Before this occurred, she was drinking approximately 6 beers and took 1 pack of BC powder for a headache. She then went to the bathroom d/t nausea and began having emesis and had an episode of diarrhea after. She denies dizziness or syncope. She denies history of hematemesis or dark stools. She reports that she drinks "a couple beers" 3 times a week. She takes BC powder 3 times a week for headache. She smokes 1 pack a day and has a 30 pack year history. She denies CP, SOB, recent illness or sick contacts, numbness, and any known hx of cardiac, liver, or kidney problems. She reports her last colonoscopy was "10 years ago". Her last echo showed moderate aortic stenosis and an EF of 43%.     She reports that she has "pre-diabetes" and takes metformin. She has a history of bilateral knee arthritis that causes "burning pain" when she walks. She is on atorvastatin and fenofibrate for HLD. She is on amlodipine and toprol for HTN. She reports taking "1/2 a klonopin" per night for sleep, but hasn't filled it this year per CC. She takes vitamin D and fish oil. She has a family history significant for cardiovascular disease. She reports that her PCP of 15 " years moved recently and she has been having trouble finding a new one and filling her medications.    In the ED: afebrile, BP 87/52, symptomatic. Hb 8.1. Na 128, CO2 14, BUN 27, Cr 2, glucose 129, ALP 31. Bili, AST, ALT, lipase wnl. Hep C, HIV negative. EKG showed NSR and prolonged QT (505s). CXR showed stable cardiomegaly and no focal consolidation. CT AP noncon showed small hiatal hernia and no acute GI process.  Given 1 unit pbr.Given 2g ceftriaxone, droperidol, ondansetron, IV 80 mg pantoprazole, and octreotide, and one dose of lorazepam for CT anxiety.         Past Medical History:   Diagnosis Date    Allergy     Anxiety     Arthritis     Depression     GERD (gastroesophageal reflux disease)     Hyperlipidemia     Hypertension     Tobacco abuse     Vitamin D deficiency disease        Past Surgical History:   Procedure Laterality Date    child birth      OPEN REDUCTION AND INTERNAL FIXATION (ORIF) OF FRACTURE OF PROXIMAL HUMERUS Left 6/28/2018    Procedure: ORIF, HUMERUS, PROXIMAL- synthes- left- c arm;  Surgeon: Quinn Parker MD;  Location: [facility] OR [address] [place] [address];  Service: Orthopedics;  Laterality: Left;    ORIF HUMERUS FRACTURE Left 6/28/2018    Procedure: ORIF, FRACTURE, HUMERUS - shaft;  Surgeon: Quinn Parker MD;  Location: [facility] OR [address] [place] [address];  Service: Orthopedics;  Laterality: Left;       Review of patient's allergies indicates:  No Known Allergies    No current facility-administered medications on file prior to encounter.     Current Outpatient Medications on File Prior to Encounter   Medication Sig    amLODIPine (NORVASC) 10 MG tablet Take 1 tablet (10 mg total) by mouth once daily.    aspirin 325 MG tablet Take 1 tablet (325 mg total) by mouth once daily.    atorvastatin (LIPITOR) 40 MG tablet Take 1 tablet (40 mg total) by mouth once daily.    blood pressure test kit-large Kit 1 each by Misc.(Non-Drug; Combo Route) route 2 (two) times a day.    clonazePAM (KLONOPIN) 1 MG tablet Take 1 tablet (1 mg  total) by mouth every evening.    ergocalciferol (ERGOCALCIFEROL) 50,000 unit Cap Take 1 capsule (50,000 Units total) by mouth every 7 days.    fenofibrate 160 MG Tab TAKE 1 TABLET(160 MG) BY MOUTH DAILY AS NEEDED    gabapentin (NEURONTIN) 300 MG capsule TAKE 1 CAPSULE(300 MG) BY MOUTH THREE TIMES DAILY    losartan-hydrochlorothiazide 100-25 mg (HYZAAR) 100-25 mg per tablet Take 1 tablet by mouth once daily.    metFORMIN (GLUCOPHAGE) 500 MG tablet TAKE 1 TABLET BY MOUTH EVERY DAY WITH BREAKFAST    metoprolol succinate (TOPROL-XL) 100 MG 24 hr tablet Take 1 tablet (100 mg total) by mouth once daily.    MULTIVITS,CA,MINERALS/IRON/FA (ONE-A-DAY WOMENS FORMULA ORAL) Take 1 tablet by mouth once daily.    omega-3 fatty acids-vitamin E (FISH OIL) 1,000 mg Cap Take 2 capsules by mouth once daily.    omeprazole (PRILOSEC) 40 MG capsule Take 1 capsule (40 mg total) by mouth every morning.    paroxetine (PAXIL) 40 MG tablet TAKE 1 TABLET(40 MG) BY MOUTH DAILY    triamcinolone acetonide 0.1% (KENALOG) 0.1 % ointment Apply 1 application  topically 2 (two) times daily. Apply to affected area    walker (ULTRA-LIGHT ROLLATOR) Misc 1 each by Misc.(Non-Drug; Combo Route) route once daily.    [DISCONTINUED] cetirizine (ZYRTEC) 10 MG tablet Take 1 tablet (10 mg total) by mouth every evening. (Patient not taking: Reported on 9/1/2020)    [DISCONTINUED] citalopram (CELEXA) 40 MG tablet TAKE 1 TABLET BY MOUTH EVERY DAY (Patient not taking: Reported on 9/1/2020)    [DISCONTINUED] fenofibrate 160 MG Tab Take 1 tablet (160 mg total) by mouth once daily.    [DISCONTINUED] fenofibrate 160 MG Tab Po daily    [DISCONTINUED] HYDROcodone-acetaminophen (NORCO)  mg per tablet Take 1 tablet by mouth every 4 to 6 hours as needed.    [DISCONTINUED] hydrocortisone 2.5 % ointment Apply topically 2 (two) times daily. To affected area (Patient not taking: Reported on 9/1/2020)    [DISCONTINUED] ketoconazole (NIZORAL) 2 % shampoo Apply topically twice  a week. (Patient not taking: Reported on 9/1/2020)    [DISCONTINUED] losartan-hydrochlorothiazide 100-25 mg (HYZAAR) 100-25 mg per tablet Take 1 tablet by mouth every morning.    [DISCONTINUED] losartan-hydrochlorothiazide 100-25 mg (HYZAAR) 100-25 mg per tablet Take 1 tablet by mouth every morning.    [DISCONTINUED] losartan-hydrochlorothiazide 100-25 mg (HYZAAR) 100-25 mg per tablet Take 1 tablet by mouth once daily.    [DISCONTINUED] meclizine (ANTIVERT) 25 mg tablet Take 1 tablet (25 mg total) by mouth 3 (three) times daily as needed for Nausea. (Patient not taking: Reported on 9/1/2020)    [DISCONTINUED] metoprolol succinate (TOPROL-XL) 50 MG 24 hr tablet Take 1 tablet (50 mg total) by mouth once daily.    [DISCONTINUED] metoprolol succinate (TOPROL-XL) 50 MG 24 hr tablet TAKE 1 TABLET BY MOUTH EVERY DAY    [DISCONTINUED] omeprazole (PRILOSEC) 20 MG capsule TAKE ONE CAPSULE BY MOUTH EVERY DAY    [DISCONTINUED] omeprazole (PRILOSEC) 40 MG capsule TAKE 1 CAPSULE BY MOUTH EVERY DAY    [DISCONTINUED] omeprazole (PRILOSEC) 40 MG capsule TAKE ONE CAPSULE BY MOUTH EVERY DAY.    [DISCONTINUED] sulfamethoxazole-trimethoprim 800-160mg (BACTRIM DS) 800-160 mg Tab Take 1 tablet by mouth 2 (two) times daily.    [DISCONTINUED] traMADoL (ULTRAM) 50 mg tablet Take 1 tablet (50 mg total) by mouth every 12 (twelve) hours as needed.     Family History       Problem Relation (Age of Onset)    Heart disease Mother    Hypertension Mother    Skin cancer Mother          Tobacco Use    Smoking status: Every Day     Current packs/day: 1.50     Types: Cigarettes    Smokeless tobacco: Never   Substance and Sexual Activity    Alcohol use: Yes     Comment: 12 pack every 3 days    Drug use: No    Sexual activity: Yes     Partners: Male     Review of Systems   Constitutional:  Negative for fever and unexpected weight change.   Respiratory:  Negative for cough and shortness of breath.    Cardiovascular:  Negative for chest pain and leg  swelling.   Gastrointestinal:  Positive for abdominal pain, nausea and vomiting (3 episodes of hematemesis). Negative for blood in stool, constipation and diarrhea.   Genitourinary: Negative.    Musculoskeletal:  Positive for arthralgias (bilateral knees).   Neurological:  Negative for dizziness, weakness, light-headedness and numbness.     Objective:   [unfilled]Weight: 98.9 kg (218 lb)  Body mass index is 34.14 kg/m².    Vitals:    07/31/25 0800   BP: (!) 191/84   Pulse: 69   Resp:    Temp:        Physical Exam  Constitutional:       General: She is not in acute distress.  Cardiovascular:      Heart sounds: Murmur (systolic ejection murmur) heard.   Pulmonary:      Effort: Pulmonary effort is normal.      Breath sounds: Normal breath sounds.   Abdominal:      Palpations: Abdomen is soft.      Tenderness: There is abdominal tenderness (epigastric).   Musculoskeletal:         General: No swelling.      Right lower leg: No edema.      Left lower leg: No edema.   Skin:     General: Skin is warm and dry.      Findings: No bruising.   Neurological:      Mental Status: She is alert.       Significant Labs: All pertinent labs within the past 24 hours have been reviewed.    Significant Imaging: I have reviewed all pertinent imaging results/findings within the past 24 hours.    Assessment/Plan:      Hematemesis  Patient presents with a hx of three episodes of hematemesis last night and today morning in the ED. Reports bright red and large volume. She denies hx of melena and hematemesis. She reports she drank 6 beers before this and used 1 pack of BC powder, both of which she regularly does. She is also a current smoker with a 30 pack year history.    - NPO, Plan for EGD today 7/31  -CXR showed no acute process  -CT AP non con obtained showed no acute GI process, normal appearing liver, small hiatal hernia, mld diffuse gastric wall thickening, advanced calcific atherosclerotic plaque in abdominal and proximal common iliac  "arteries, 5.3 cm adrenal lesion, and left lower lobe pulmonary micronodule  - Bolus IV pantoprazole 80mg followed by 40mg BID  -Octeotride in ED, DC d/t CT AP noncon showing no liver disease  - no need for abx at this time, 2g cef in ED  - Trend Hgb q24hrs. Transfuse for Hgb > 7, unless otherwise indicated  - Maintain IV access with 2 large bore Ivs  - Intravascular resuscitation/support with IVFs   - Hold all NSAIDs and anticoagulants, unless contraindicated  - Please correct any coagulopathy with platelets and FFP for goal of platelets >50K and INR <2.0    2. Symptomatic Anemia  Patient presented to ED with BP 87/52 and Hb 8.1. Reported dizziness and lightheadedness.    -Trend Hgb q24hrs. Transfuse for Hgb < 7, unless otherwise indicated    3. Hyponatremia  Patient presents with Na 128 in ED. Likely hypovolemic hyponatremia 2/2 hematemesis or alcohol. Patient takes paroxetine, SIADH on ddx, less likely    Lab Results   Component Value Date     (L) 07/31/2025     -Asymptomatic  -Consider serum osm, urine studies if symptoms develop or Na worsens    4. Elevated Cr  Patient presents with BUN of 27 and Cr of 2. Per Ccm her Cr has been rising and she likely has baseline CKD. If this is an TJ, could be d/t prerenal causes 2/2 hematemesis or poor PO intake. GIB also causes rise in BUN. UA noninfectious    -Trend CMPs Q24  -avoid nephrotoxic meds, NSAIDs  -Monitor UOP, I and Os  -Consider f/u OP with nephrology    5. HTN  Patient has history of HTN.  SBP max in 190s in ED. Asymptomatic. Takes amlodipine and toprol at home.     -likely "white coat" HTN  -continue to monitor, holding home regimen for now    6. HLD  Patient has history of HLD on atorvastatin and fenofibrate at home.    -Continue atorvastatin 40 mg  -Renal dosing for fenofibrate-48 mg  -monitor CrCl    7. Depression  Patient has history of mood disorder and anxiety.    -continue home paroxetine 40 mg.     8. Pre-diabetes  Patient has history of " "pre-diabetes, last A1Cs 6 and 6.5. Has had A1Cs over 7 in the past.    -holding home metformin IP  -Diabetic Diet after EGD    9. Tobacco Use  Patient reports smoking one pack a day for 30 years. Reports possible attempts to quit in past.    -nicotine patch 21 mg PRN  -smoking cessation counseling  -resources offered for hospital assistance program    10. EtOH   Patient reports hx of "6 beers a day 3x a week".    -CIWA scores, will monitor and consider need for benzo PRN  -counseling  -F/u OP with PCP or other resources      10. Hx of Clonazepam use  Patient reports hx of use for "sleep" and reports she takes "1/2" tablet per night. No recent fill hx per CC.    -continue to monitor, no acute concerns     12. AS with EF 40-45%  Most recent echo from 2023 showed she had moderate AS and an EF of 40-45%. Systolic murmur present on exam.    -Continue to monitor, no acute concerns  -F/u OP with PCP or cardiology    13. Atherosclerotic plaque in AA  CT AP noncon found "Prominent intraluminal calcific plaque within the distal abdominal aorta and proximal common iliac arteries concerning for severe narrowing"    -No acute concerns, f/u with Vascular OP with possible CTA  -continue to monitor    14. Adrenal Nodule  CT AP noncon found a 5.3 cm lesion on left adrenal.    -No acute concerns, f/u OP with endocrinology    15. Lung Nodule  CT AP noncon found a left lower lobe pulmonary nodule    -No acute concerns, f/u OP with pulmonology    16. Debility  Patient reports difficulty walking and burning pains.    -Consider PT/OT eval, OP sessions        VTE Risk Mitigation (From admission, onward)           Ordered     IP VTE HIGH RISK PATIENT  Once         07/31/25 0718     Place sequential compression device  Until discontinued         07/31/25 0718                  .      Kalani Tracey, MS4  "

## 2025-07-31 NOTE — H&P (VIEW-ONLY)
[facility] - Emergency Dept  Gastroenterology  Consult Note    Patient Name: Shannon Coleman  MRN: [id number]  Admission Date: 7/31/2025  Hospital Length of Stay: 0 days  Code Status: Full Code   Attending Provider: Elizabeth Gore MD   Consulting Provider: Ang Herndon MD  Primary Care Physician: Wagner Aragon MD  Principal Problem:<principal problem not specified>    Inpatient consult to Gastroenterology  Consult performed by: Ang Herndon MD  Consult ordered by: Javed Delgadillo MD  Reason for consult: Hematemesis        Subjective:     HPI:  Shannon Coleman is a 65 y.o. female with history of HTN, HLD, GERD, anxiety, prediabetes, and current smoker who presented to the ED this AM for hematemesis. Pt states she had 2 large volume episodes around 10:30pm last night. Has been taking goodie powder 2-3 times per week for the past few months and drank 6 beers last night. Denies other NSAID use, blood thinner use. Drinks 3-6 beers in a sitting 2-3 times per week. Denies any hematochezia or melanic stools. Pt has epigastric pain that is baseline for her, related to longstanding GERD. Denies chest pain, SOB, cough. Has been taking omeprazole for several years for GERD, however has run out of most of her medications for the past few weeks due to PCP change. No prior EGDs. Had a colonoscopy in 2011 without findings per pt. Had one additional episode of large volume hematemesis in the ED.    GI consulted for hematemesis.    Upon arrival to the ED, VSS and AF. At 0500 became diaphoretic and hypotensive to 78/42 with pulse 69, satting 98% RA. Received 1u pRBC shortly after that, as well as octreotide and pantoprazole 80mg IV. Vitals at 0630 hypertensive to 170/91, pulse 63, satting 100% 1L NC. Initial labs significant for Hgb 8.1, WBC WNL, Na 128, BUN 27, Cr 2.0 (baseline 1.4), normal PT/INR 11.4/1.0. CT A/P pending. Chest xray without acute process. CARLOS EDUARDO in ED without hematochezia or melena.        Past Medical History:   Diagnosis  Date    Allergy     Anxiety     Arthritis     Depression     GERD (gastroesophageal reflux disease)     Hyperlipidemia     Hypertension     Tobacco abuse     Vitamin D deficiency disease        Past Surgical History:   Procedure Laterality Date    child birth      OPEN REDUCTION AND INTERNAL FIXATION (ORIF) OF FRACTURE OF PROXIMAL HUMERUS Left 6/28/2018    Procedure: ORIF, HUMERUS, PROXIMAL- synthes- left- c arm;  Surgeon: Quinn Parker MD;  Location: [address];  Service: Orthopedics;  Laterality: Left;    ORIF HUMERUS FRACTURE Left 6/28/2018    Procedure: ORIF, FRACTURE, HUMERUS - shaft;  Surgeon: Quinn Parker MD;  Location: [address];  Service: Orthopedics;  Laterality: Left;       Review of patient's allergies indicates:  No Known Allergies  Family History       Problem Relation (Age of Onset)    Heart disease Mother    Hypertension Mother    Skin cancer Mother          Tobacco Use    Smoking status: Every Day     Current packs/day: 1.50     Types: Cigarettes    Smokeless tobacco: Never   Substance and Sexual Activity    Alcohol use: Yes     Comment: 12 pack every 3 days    Drug use: No    Sexual activity: Yes     Partners: Male     Review of Systems   Constitutional:  Negative for activity change, appetite change, chills, fatigue and fever.   Respiratory:  Negative for cough, choking and shortness of breath.    Cardiovascular:  Negative for chest pain and palpitations.   Gastrointestinal:  Positive for abdominal pain, nausea and vomiting. Negative for abdominal distention, constipation and diarrhea.   Genitourinary:  Negative for difficulty urinating and dysuria.   Musculoskeletal:  Negative for arthralgias.   Neurological:  Positive for dizziness, weakness and light-headedness. Negative for tremors and syncope.   Psychiatric/Behavioral:  Negative for agitation, behavioral problems and confusion.      Objective:     Vital Signs (Most Recent):  Temp: 98.1 °F (36.7 °C) (07/31/25 0700)  Pulse:  72 (07/31/25 0900)  Resp: (!) 22 (07/31/25 0700)  BP: (!) 173/72 (07/31/25 0818)  SpO2: 97 % (07/31/25 0900) Vital Signs (24h Range):  Temp:  [97.7 °F (36.5 °C)-98.2 °F (36.8 °C)] 98.1 °F (36.7 °C)  Pulse:  [63-75] 72  Resp:  [18-22] 22  SpO2:  [94 %-100 %] 97 %  BP: ()/(42-91) 173/72     Weight: 98.9 kg (218 lb) (07/31/25 0438)  Body mass index is 34.14 kg/m².      Intake/Output Summary (Last 24 hours) at 7/31/2025 0934  Last data filed at 7/31/2025 0735  Gross per 24 hour   Intake 1035.37 ml   Output --   Net 1035.37 ml       Lines/Drains/Airways       Drain  Duration             Female External Urinary Catheter w/ Suction 07/31/25 0800 <1 day              Peripheral Intravenous Line  Duration             Peripheral IV Single Lumen 07/31/25 0443 18 G Right Antecubital <1 day    Peripheral IV Single Lumen 07/31/25 0445 20 G Left;Posterior Wrist <1 day    Peripheral IV Single Lumen 07/31/25 0525 16 G Left Antecubital <1 day                     Physical Exam  Constitutional:       General: She is not in acute distress.     Appearance: She is not ill-appearing or toxic-appearing.   HENT:      Head: Normocephalic and atraumatic.      Right Ear: External ear normal.      Left Ear: External ear normal.      Nose: Nose normal.      Mouth/Throat:      Mouth: Mucous membranes are dry.      Pharynx: Oropharynx is clear.   Eyes:      Extraocular Movements: Extraocular movements intact.      Conjunctiva/sclera: Conjunctivae normal.   Cardiovascular:      Rate and Rhythm: Normal rate and regular rhythm.      Heart sounds: Normal heart sounds.   Pulmonary:      Effort: Pulmonary effort is normal.   Abdominal:      General: Abdomen is flat. Bowel sounds are normal. There is no distension.      Palpations: Abdomen is soft. There is no mass.      Tenderness: There is abdominal tenderness in the epigastric area. There is no guarding.      Hernia: No hernia is present.   Musculoskeletal:         General: Normal range of motion.       Right lower leg: No edema.      Left lower leg: No edema.   Skin:     General: Skin is warm and dry.   Neurological:      General: No focal deficit present.      Mental Status: She is alert and oriented to person, place, and time.   Psychiatric:         Mood and Affect: Mood normal.         Behavior: Behavior normal.          Significant Labs:  CBC:   Recent Labs   Lab 07/31/25  0513   WBC 10.62   HGB 8.1*   HCT 24.8*        CMP:   Recent Labs   Lab 07/31/25  0513   *   CALCIUM 8.2*   ALBUMIN 3.1*   PROT 6.3   *   K 3.5   CO2 14*      BUN 27*   CREATININE 2.0*   ALKPHOS 31*   ALT 14   AST 25   BILITOT 0.2     Coagulation:   Recent Labs   Lab 07/31/25  0513   INR 1.0       Significant Imaging:  Imaging results within the past 24 hours have been reviewed.  Assessment/Plan:     Shannon Coleman is a 65 y.o. female with history of HTN, HLD, GERD, anxiety, prediabetes, and current smoker who presented to the ED overnight 7/31/25 for hematemesis.    Problem List:  Hematemesis  Acute symptomatic anemia    In the ED, patient was initially HDS prior to an episode of hematemesis around 0500. Patient became weak, diaphoretic, dizzy and then became hypotensive to 78/42. Patient received 1u pRBC and IVF and improved. Hgb 8.1, baseline around 11. Patient is now hypertensive most likely due to her not having antihypertensive medication for weeks. She has epigastric abd pain which is chronic, and has not had any further episodes of hematemesis since morning of 7/31/25. CT A/P without contrast performed given pt kidney function. CT with small hiatal hernia, no acute gastrointestinal etiology seen. Plan for EGD today to evaluate for variceal vs ulcer bleeding.    Recommendations:    - Plan for EGD today.  - Trend Hgb q8hrs. Transfuse for Hgb <7, unless otherwise indicated.  - Maintain IV access with 2 large bore IV.  - Intravascular resuscitation/support with IVFs.  - NPO.  - Hold all NSAIDs and  anticoagulants, unless contraindicated.  - Bolus IV pantoprazole 80mg followed by 40mg BID.  - Discontinue octreotide.  - Please correct any coagulopathy with platelets and FFP for goal of platelets >50K and INR <2.0.  - Please notify GI team if there is significant change in patient's clinical status.       Thank you for your consult. I will follow-up with patient. Please contact us if you have any additional questions.    Note prepared in conjunction with Fritz Adame, MS4. I have reviewed the above note and made changes where necessary. I have personally interviewed and examined the patient.     Ang Herndon MD  Gastroenterology  [facility] - Emergency Dept

## 2025-07-31 NOTE — CONSULTS
[facility] - Emergency Dept  Gastroenterology  Consult Note    Patient Name: Shannon Coleman  MRN: [id number]  Admission Date: 7/31/2025  Hospital Length of Stay: 0 days  Code Status: Full Code   Attending Provider: Elizabeth Gore MD   Consulting Provider: Ang Herndon MD  Primary Care Physician: Wagner Aragon MD  Principal Problem:<principal problem not specified>    Inpatient consult to Gastroenterology  Consult performed by: Ang Herndon MD  Consult ordered by: Javed Delgadillo MD  Reason for consult: Hematemesis        Subjective:     HPI:  Shannon Coleman is a 65 y.o. female with history of HTN, HLD, GERD, anxiety, prediabetes, and current smoker who presented to the ED this AM for hematemesis. Pt states she had 2 large volume episodes around 10:30pm last night. Has been taking goodie powder 2-3 times per week for the past few months and drank 6 beers last night. Denies other NSAID use, blood thinner use. Drinks 3-6 beers in a sitting 2-3 times per week. Denies any hematochezia or melanic stools. Pt has epigastric pain that is baseline for her, related to longstanding GERD. Denies chest pain, SOB, cough. Has been taking omeprazole for several years for GERD, however has run out of most of her medications for the past few weeks due to PCP change. No prior EGDs. Had a colonoscopy in 2011 without findings per pt. Had one additional episode of large volume hematemesis in the ED.    GI consulted for hematemesis.    Upon arrival to the ED, VSS and AF. At 0500 became diaphoretic and hypotensive to 78/42 with pulse 69, satting 98% RA. Received 1u pRBC shortly after that, as well as octreotide and pantoprazole 80mg IV. Vitals at 0630 hypertensive to 170/91, pulse 63, satting 100% 1L NC. Initial labs significant for Hgb 8.1, WBC WNL, Na 128, BUN 27, Cr 2.0 (baseline 1.4), normal PT/INR 11.4/1.0. CT A/P pending. Chest xray without acute process. CARLOS EDUARDO in ED without hematochezia or melena.        Past Medical History:   Diagnosis  Date    Allergy     Anxiety     Arthritis     Depression     GERD (gastroesophageal reflux disease)     Hyperlipidemia     Hypertension     Tobacco abuse     Vitamin D deficiency disease        Past Surgical History:   Procedure Laterality Date    child birth      OPEN REDUCTION AND INTERNAL FIXATION (ORIF) OF FRACTURE OF PROXIMAL HUMERUS Left 6/28/2018    Procedure: ORIF, HUMERUS, PROXIMAL- synthes- left- c arm;  Surgeon: Quinn Parker MD;  Location: [address];  Service: Orthopedics;  Laterality: Left;    ORIF HUMERUS FRACTURE Left 6/28/2018    Procedure: ORIF, FRACTURE, HUMERUS - shaft;  Surgeon: Quinn Parker MD;  Location: [address];  Service: Orthopedics;  Laterality: Left;       Review of patient's allergies indicates:  No Known Allergies  Family History       Problem Relation (Age of Onset)    Heart disease Mother    Hypertension Mother    Skin cancer Mother          Tobacco Use    Smoking status: Every Day     Current packs/day: 1.50     Types: Cigarettes    Smokeless tobacco: Never   Substance and Sexual Activity    Alcohol use: Yes     Comment: 12 pack every 3 days    Drug use: No    Sexual activity: Yes     Partners: Male     Review of Systems   Constitutional:  Negative for activity change, appetite change, chills, fatigue and fever.   Respiratory:  Negative for cough, choking and shortness of breath.    Cardiovascular:  Negative for chest pain and palpitations.   Gastrointestinal:  Positive for abdominal pain, nausea and vomiting. Negative for abdominal distention, constipation and diarrhea.   Genitourinary:  Negative for difficulty urinating and dysuria.   Musculoskeletal:  Negative for arthralgias.   Neurological:  Positive for dizziness, weakness and light-headedness. Negative for tremors and syncope.   Psychiatric/Behavioral:  Negative for agitation, behavioral problems and confusion.      Objective:     Vital Signs (Most Recent):  Temp: 98.1 °F (36.7 °C) (07/31/25 0700)  Pulse:  72 (07/31/25 0900)  Resp: (!) 22 (07/31/25 0700)  BP: (!) 173/72 (07/31/25 0818)  SpO2: 97 % (07/31/25 0900) Vital Signs (24h Range):  Temp:  [97.7 °F (36.5 °C)-98.2 °F (36.8 °C)] 98.1 °F (36.7 °C)  Pulse:  [63-75] 72  Resp:  [18-22] 22  SpO2:  [94 %-100 %] 97 %  BP: ()/(42-91) 173/72     Weight: 98.9 kg (218 lb) (07/31/25 0438)  Body mass index is 34.14 kg/m².      Intake/Output Summary (Last 24 hours) at 7/31/2025 0934  Last data filed at 7/31/2025 0735  Gross per 24 hour   Intake 1035.37 ml   Output --   Net 1035.37 ml       Lines/Drains/Airways       Drain  Duration             Female External Urinary Catheter w/ Suction 07/31/25 0800 <1 day              Peripheral Intravenous Line  Duration             Peripheral IV Single Lumen 07/31/25 0443 18 G Right Antecubital <1 day    Peripheral IV Single Lumen 07/31/25 0445 20 G Left;Posterior Wrist <1 day    Peripheral IV Single Lumen 07/31/25 0525 16 G Left Antecubital <1 day                     Physical Exam  Constitutional:       General: She is not in acute distress.     Appearance: She is not ill-appearing or toxic-appearing.   HENT:      Head: Normocephalic and atraumatic.      Right Ear: External ear normal.      Left Ear: External ear normal.      Nose: Nose normal.      Mouth/Throat:      Mouth: Mucous membranes are dry.      Pharynx: Oropharynx is clear.   Eyes:      Extraocular Movements: Extraocular movements intact.      Conjunctiva/sclera: Conjunctivae normal.   Cardiovascular:      Rate and Rhythm: Normal rate and regular rhythm.      Heart sounds: Normal heart sounds.   Pulmonary:      Effort: Pulmonary effort is normal.   Abdominal:      General: Abdomen is flat. Bowel sounds are normal. There is no distension.      Palpations: Abdomen is soft. There is no mass.      Tenderness: There is abdominal tenderness in the epigastric area. There is no guarding.      Hernia: No hernia is present.   Musculoskeletal:         General: Normal range of motion.       Right lower leg: No edema.      Left lower leg: No edema.   Skin:     General: Skin is warm and dry.   Neurological:      General: No focal deficit present.      Mental Status: She is alert and oriented to person, place, and time.   Psychiatric:         Mood and Affect: Mood normal.         Behavior: Behavior normal.          Significant Labs:  CBC:   Recent Labs   Lab 07/31/25  0513   WBC 10.62   HGB 8.1*   HCT 24.8*        CMP:   Recent Labs   Lab 07/31/25  0513   *   CALCIUM 8.2*   ALBUMIN 3.1*   PROT 6.3   *   K 3.5   CO2 14*      BUN 27*   CREATININE 2.0*   ALKPHOS 31*   ALT 14   AST 25   BILITOT 0.2     Coagulation:   Recent Labs   Lab 07/31/25  0513   INR 1.0       Significant Imaging:  Imaging results within the past 24 hours have been reviewed.  Assessment/Plan:     Shannon Coleman is a 65 y.o. female with history of HTN, HLD, GERD, anxiety, prediabetes, and current smoker who presented to the ED overnight 7/31/25 for hematemesis.    Problem List:  Hematemesis  Acute symptomatic anemia    In the ED, patient was initially HDS prior to an episode of hematemesis around 0500. Patient became weak, diaphoretic, dizzy and then became hypotensive to 78/42. Patient received 1u pRBC and IVF and improved. Hgb 8.1, baseline around 11. Patient is now hypertensive most likely due to her not having antihypertensive medication for weeks. She has epigastric abd pain which is chronic, and has not had any further episodes of hematemesis since morning of 7/31/25. CT A/P without contrast performed given pt kidney function. CT with small hiatal hernia, no acute gastrointestinal etiology seen. Plan for EGD today to evaluate for variceal vs ulcer bleeding.    Recommendations:    - Plan for EGD today.  - Trend Hgb q8hrs. Transfuse for Hgb <7, unless otherwise indicated.  - Maintain IV access with 2 large bore IV.  - Intravascular resuscitation/support with IVFs.  - NPO.  - Hold all NSAIDs and  anticoagulants, unless contraindicated.  - Bolus IV pantoprazole 80mg followed by 40mg BID.  - Discontinue octreotide.  - Please correct any coagulopathy with platelets and FFP for goal of platelets >50K and INR <2.0.  - Please notify GI team if there is significant change in patient's clinical status.       Thank you for your consult. I will follow-up with patient. Please contact us if you have any additional questions.    Note prepared in conjunction with Fritz Adame, MS4. I have reviewed the above note and made changes where necessary. I have personally interviewed and examined the patient.     Ang Herndon MD  Gastroenterology  [facility] - Emergency Dept

## 2025-07-31 NOTE — MEDICAL/APP STUDENT
PSHx: She has a history of an ORIF, nasal skin cancer removal, and childbirth. Colonscopy 10 years ago, on three yearly?    Pfhx: Her mother is  and had an MI at 79, her sister has had three blockages s/p stents, and HLD at 72. Her other siblings and children are healthy. Her father was murdered.    Pshx: 30 year tobacco hx and 6 beers a week for three days. She presents with  today. She has dogs. She used to play softball.    Meds: She reports her PCP of 15 years recently moved, and she is having trouble finding  new one and filling her medications. Her last visit appears to be February. amlodipine 10 and toprol for 100 mg for HTN. Atorvostatin 40 and fenofibrate 160 for HLD. Metformin 500 mg for prediabetes. BC powder for headaches. Paroxetine 40 Fish oil and vitamin d supplements.     Allergies: NKA per CC.

## 2025-07-31 NOTE — ED NOTES
Javed Delgadillo MD and Avery Dang MD aware of pts BP of 78/42. Pt diaphoretic, lightheaded and states she feels like she is going to pass out. MD at bedside with RN.

## 2025-07-31 NOTE — ASSESSMENT & PLAN NOTE
- was using klonopin at home for anxiety  - given 1mg of ativan prior to CT, will continue to monitor

## 2025-07-31 NOTE — PLAN OF CARE
[facility] - Emergency Dept  Initial Discharge Assessment       Primary Care Provider: Wagner Aragon MD    Admission Diagnosis: GI bleed [K92.2]    Admission Date: 7/31/2025  Expected Discharge Date:     Pt stated she is independent with her ambulation and ADL's and does not require assistance or equipment.    Post acute services discussed and declined at this time    Pt to d/c home with no needs when ready    Transition of Care Barriers: (P) None    Payor: [employer] [facility] / Plan: [employer] / Product Type: Medicare Advantage /     Extended Emergency Contact Information  Primary Emergency Contact: JaredBrayan  Address: [address] [place] [address] [address] [place] [address] [facility]  Home Phone: [phone]  Relation: Spouse    Discharge Plan A: (P) Home  Discharge Plan B: (P) Home      [id number] - [place], [place] - [address]  [address]  [place] [address]  Phone: [phone] Fax: [phone]    [facility] [id number] [place] [address] EXP AT [address] [address]  Phone: [phone] Fax: [phone]      Initial Assessment (most recent)       Adult Discharge Assessment - 07/31/25 1103          Discharge Assessment    Assessment Type Discharge Planning Assessment (P)      Confirmed/corrected address, phone number and insurance Yes (P)      Confirmed Demographics Correct on Facesheet (P)      Source of Information patient (P)      Communicated ALIZA with patient/caregiver Yes (P)      People in Home spouse (P)      Name(s) of People in Home spouse Brayan (P)      Facility Arrived From: home (P)      Do you expect to return to your current living situation? Yes (P)      Do you have help at home or someone to help you manage your care at home? No (P)      Prior to hospitilization cognitive status: Alert/Oriented;No Deficits (P)      Current cognitive status: No Deficits;Alert/Oriented (P)      Walking or Climbing  Stairs Difficulty no (P)      Dressing/Bathing Difficulty no (P)      Home Accessibility wheelchair accessible (P)      Home Layout Able to live on 1st floor (P)      Equipment Currently Used at Home none (P)      Readmission within 30 days? No (P)      Patient currently being followed by outpatient case management? No (P)      Do you currently have service(s) that help you manage your care at home? No (P)      Do you take prescription medications? Yes (P)      Do you have prescription coverage? Yes (P)      Do you have any problems affording any of your prescribed medications? No (P)      Is the patient taking medications as prescribed? yes (P)      Who is going to help you get home at discharge? family/friends (P)      How do you get to doctors appointments? car, drives self (P)      Are you on dialysis? No (P)      Do you take coumadin? No (P)      Discharge Plan A Home (P)      Discharge Plan B Home (P)      DME Needed Upon Discharge  none (P)      Discharge Plan discussed with: Patient (P)      Transition of Care Barriers None (P)         Physical Activity    On average, how many days per week do you engage in moderate to strenuous exercise (like a brisk walk)? 0 days (P)      On average, how many minutes do you engage in exercise at this level? 0 min (P)         Financial Resource Strain    How hard is it for you to pay for the very basics like food, housing, medical care, and heating? Not very hard (P)         Housing Stability    In the last 12 months, was there a time when you were not able to pay the mortgage or rent on time? No (P)      At any time in the past 12 months, were you homeless or living in a shelter (including now)? No (P)         Transportation Needs    In the past 12 months, has lack of transportation kept you from medical appointments or from getting medications? No (P)      In the past 12 months, has lack of transportation kept you from meetings, work, or from getting things needed for  daily living? No (P)         Food Insecurity    Within the past 12 months, you worried that your food would run out before you got the money to buy more. Never true (P)      Within the past 12 months, the food you bought just didn't last and you didn't have money to get more. Never true (P)         Stress    Do you feel stress - tense, restless, nervous, or anxious, or unable to sleep at night because your mind is troubled all the time - these days? Only a little (P)         Social Isolation    How often do you feel lonely or isolated from those around you?  Never (P)         Alcohol Use    Q1: How often do you have a drink containing alcohol? 2-3 times a week (P)      Q2: How many drinks containing alcohol do you have on a typical day when you are drinking? 7 to 9 (P)      Q3: How often do you have six or more drinks on one occasion? Weekly (P)         Utilities    In the past 12 months has the electric, gas, oil, or water company threatened to shut off services in your home? No (P)         Health Literacy    How often do you need to have someone help you when you read instructions, pamphlets, or other written material from your doctor or pharmacy? Sometimes (P)                       Discharge Plan A and Plan B have been determined by review of patient's clinical status, future medical and therapeutic needs, and coverage/benefits for post-acute care in coordination with multidisciplinary team members.    Araseli Velazquez CD, MSW, LMSW, RSW   Case Management  [facility]  Email: [email or web address]

## 2025-07-31 NOTE — ED TRIAGE NOTES
Shannon Coleman, a 65 y.o. female presents to the ED w/ complaint of emesis. Pt reports hematemesis that started around 2230 yesterday. Pt reporting BRB with clots. Also endorses hot flashes, lower quadrant pain and diarrhea. Denies CP, SOB, fevers or any other complaints.

## 2025-07-31 NOTE — PROVATION PATIENT INSTRUCTIONS
Discharge Summary/Instructions after an Endoscopic Procedure  Patient Name: Shannon Coleman  Patient MRN: [id number]  Patient YOB: 1960 Thursday, July 31, 2025  Reese Gill MD  Dear patient,  As a result of recent federal legislation (The Federal Cures Act), you may   receive lab or pathology results from your procedure in your [facility]   account before your physician is able to contact you. Your physician or   their representative will relay the results to you with their   recommendations at their soonest availability.  Thank you,  RESTRICTIONS:  During your procedure today, you received medications for sedation.  These   medications may affect your judgment, balance and coordination.  Therefore,   for 24 hours, you have the following restrictions:   - DO NOT drive a car, operate machinery, make legal/financial decisions,   sign important papers or drink alcohol.    ACTIVITY:  Today: no heavy lifting, straining or running due to procedural   sedation/anesthesia.  The following day: return to full activity including work.  DIET:  Eat and drink normally unless instructed otherwise.     TREATMENT FOR COMMON SIDE EFFECTS:  - Mild abdominal pain, nausea, belching, bloating or excessive gas:  rest,   eat lightly and use a heating pad.  - Sore Throat: treat with throat lozenges and/or gargle with warm salt   water.  - Because air was used during the procedure, expelling large amounts of air   from your rectum or belching is normal.  - If a bowel prep was taken, you may not have a bowel movement for 1-3 days.    This is normal.  SYMPTOMS TO WATCH FOR AND REPORT TO YOUR PHYSICIAN:  1. Abdominal pain or bloating, other than gas cramps.  2. Chest pain.  3. Back pain.  4. Signs of infection such as: chills or fever occurring within 24 hours   after the procedure.  5. Rectal bleeding, which would show as bright red, maroon, or black stools.   (A tablespoon of blood from the rectum is not serious, especially if    hemorrhoids are present.)  6. Vomiting.  7. Weakness or dizziness.  GO DIRECTLY TO THE NEAREST EMERGENCY ROOM IF YOU HAVE ANY OF THE FOLLOWING:      Difficulty breathing              Chills and/or fever over 101 F   Persistent vomiting and/or vomiting blood   Severe abdominal pain   Severe chest pain   Black, tarry stools   Bleeding- more than one tablespoon   Any other symptom or condition that you feel may need urgent attention  Your doctor recommends these additional instructions:  If any biopsies were taken, your doctors clinic will contact you in 1 to 2   weeks with any results.  - Return patient to [facility] for [facility] care.   - Clear liquid diet.   - Continue present medications.   - Repeat EGD if she continues to have hematemesis as visualization of the   stomach was limited given large amount of food present.  For questions, problems or results please call your physician - Reese Gill MD at Work:  [phone].  [facility], EMERGENCY ROOM PHONE NUMBER: [phone]  IF A COMPLICATION OR EMERGENCY SITUATION ARISES AND YOU ARE UNABLE TO REACH   YOUR PHYSICIAN - GO DIRECTLY TO THE EMERGENCY ROOM.  Reese Gill MD  7/31/2025 5:33:24 PM  This report has been verified and signed electronically.  Dear patient,  As a result of recent federal legislation (The Federal Cures Act), you may   receive lab or pathology results from your procedure in your [facility]   account before your physician is able to contact you. Your physician or   their representative will relay the results to you with their   recommendations at their soonest availability.  Thank you,  PROVATION

## 2025-07-31 NOTE — ASSESSMENT & PLAN NOTE
Shannon Coleman is a 66 y/o female with a PMH of moderate AS noted on previous echo, HLD, HTN, pre-diabetes, GERD, tobacco and EtOH use presenting to [facility] ED for hematemesis.    Patient's hemorrhage is due to gastrointestinal bleed, this bleeding is not associated with a medication or a coagulopathy. Patients most recent Hgb, Hct, platelets, and INR are listed below.  Recent Labs     07/31/25  0513   HGB 8.1*   HCT 24.8*      INR 1.0     Plan  - Will trend hemoglobin/hematocrit Daily  - Will monitor and correct any coagulation defects  - Will transfuse if Hgb is <7g/dl (<8g/dl in cases of active ACS) or if patient has rapid bleeding leading to hemodynamic instability  - has been given 1 unit of pRBCs to date  - GI to scope this pm  - holding VTE prophylaxis until scope is done  - NPO until procedure  - protonix 40mg IV pushes until scope

## 2025-07-31 NOTE — ANESTHESIA PREPROCEDURE EVALUATION
07/31/2025  Shannon Coleman is a 65 y.o., female with hematemesis here for EGD.    Past Medical History:   Diagnosis Date    Allergy     Anxiety     Arthritis     Depression     GERD (gastroesophageal reflux disease)     Hyperlipidemia     Hypertension     Tobacco abuse     Vitamin D deficiency disease        Past Surgical History:   Procedure Laterality Date    child birth      OPEN REDUCTION AND INTERNAL FIXATION (ORIF) OF FRACTURE OF PROXIMAL HUMERUS Left 6/28/2018    Procedure: ORIF, HUMERUS, PROXIMAL- synthes- left- c arm;  Surgeon: Quinn Parker MD;  Location: [facility] OR [address] [place] [address];  Service: Orthopedics;  Laterality: Left;    ORIF HUMERUS FRACTURE Left 6/28/2018    Procedure: ORIF, FRACTURE, HUMERUS - shaft;  Surgeon: Quinn Parker MD;  Location: [facility] OR [address] [place] [address];  Service: Orthopedics;  Laterality: Left;           Pre-op Assessment          Review of Systems  Anesthesia Hx:  No problems with previous Anesthesia   History of prior surgery of interest to airway management or planning:          Denies Family Hx of Anesthesia complications.    Denies Personal Hx of Anesthesia complications.                    Social:  Alcohol Use, Smoker       Cardiovascular:     Hypertension       Denies Angina.       Denies RIVAS.                              Pulmonary:  Pulmonary Normal    Denies Asthma.    Denies Recent URI.                 Hepatic/GI:     GERD   Multiple episodes of hematemesis, last was this morning.  S/p PRBC transfusion                 Physical Exam  General: Alert, Oriented and Well nourished    Airway:  Mallampati: III   Mouth Opening: Normal  TM Distance: Normal  Neck ROM: Normal ROM    Dental:  Edentulous    Chest/Lungs:  Normal Respiratory Rate    Heart:  Rate: Normal  Rhythm: Regular Rhythm        Anesthesia Plan  Type of Anesthesia, risks & benefits discussed:    Anesthesia Type: Gen  ETT  Intra-op Monitoring Plan: Standard ASA Monitors  Post Op Pain Control Plan: multimodal analgesia and IV/PO Opioids PRN  Informed Consent: Informed consent signed with the Patient and all parties understand the risks and agree with anesthesia plan.  All questions answered.   ASA Score: 3  Day of Surgery Review of History & Physical: H&P Update referred to the surgeon/provider.    Ready For Surgery From Anesthesia Perspective.     .

## 2025-07-31 NOTE — TRANSFER OF CARE
"Anesthesia Transfer of Care Note    Patient: Shannon Coleman    Procedure(s) Performed: Procedure(s) (LRB):  EGD (ESOPHAGOGASTRODUODENOSCOPY) (N/A)    Patient location: PACU    Anesthesia Type: general    Transport from OR: Transported from OR on 6-10 L/min O2 by face mask with adequate spontaneous ventilation    Post pain: adequate analgesia    Post assessment: no apparent anesthetic complications and tolerated procedure well    Post vital signs: stable    Level of consciousness: alert and awake    Nausea/Vomiting: no nausea/vomiting    Complications: none    Transfer of care protocol was followed      Last vitals: Visit Vitals  BP (!) 163/76 (BP Location: Left arm, Patient Position: Lying)   Pulse 75   Temp 36.8 °C (98.2 °F) (Temporal)   Resp 19   Ht 5' 7" (1.702 m)   Wt 98.9 kg (218 lb)   SpO2 95%   BMI 34.14 kg/m²     "

## 2025-07-31 NOTE — ANESTHESIA PROCEDURE NOTES
Intubation    Date/Time: 7/31/2025 3:26 PM    Performed by: Johanna Diaz CRNA  Authorized by: Georgina Ledesma MD    Intubation:     Induction:  Rapid sequence induction    Intubated:  Postinduction    Mask Ventilation:  Not attempted    Attempts:  1    Attempted By:  CRNA    Method of Intubation:  Video laryngoscopy    Blade:  Watts 3    Laryngeal View Grade: Grade I - full view of cords      Difficult Airway Encountered?: No      Complications:  None    Airway Device:  Oral endotracheal tube    Airway Device Size:  7.0    Style/Cuff Inflation:  Cuffed (inflated to minimal occlusive pressure)    Tube secured:  21    Secured at:  The lips    Placement Verified By:  Capnometry    Complicating Factors:  None    Findings Post-Intubation:  BS equal bilateral and atraumatic/condition of teeth unchanged

## 2025-07-31 NOTE — ED PROVIDER NOTES
Encounter Date: 7/31/2025       History     Chief Complaint   Patient presents with    Emesis     Hematemesis, BRB with clots. Also complaining of lower quadrant abd pain and diarrhea. Started around 2230 this evening.       Patient is a 65-year-old female with past medical history of anxiety, hypertension, hyperlipidemia, prediabetes that presents to the emergency department for hematemesis.  She states that she had 2 large episodes starting around 10:30 p.m. last night.  She is also endorsing that she feels presyncopal and having hot flashes.  She reports that she has been taking goodies powders and was drinking at least 3 beers last night.  Denies any chest pain, shortness of breath, fevers, body aches, chills.  Denies any hematochezia or melena stools.        Review of patient's allergies indicates:  No Known Allergies  Past Medical History:   Diagnosis Date    Allergy     Anxiety     Arthritis     Depression     GERD (gastroesophageal reflux disease)     Hyperlipidemia     Hypertension     Tobacco abuse     Vitamin D deficiency disease      Past Surgical History:   Procedure Laterality Date    child birth      OPEN REDUCTION AND INTERNAL FIXATION (ORIF) OF FRACTURE OF PROXIMAL HUMERUS Left 6/28/2018    Procedure: ORIF, HUMERUS, PROXIMAL- synthes- left- c arm;  Surgeon: Quinn Parker MD;  Location: [facility] OR [address] [place] [address];  Service: Orthopedics;  Laterality: Left;    ORIF HUMERUS FRACTURE Left 6/28/2018    Procedure: ORIF, FRACTURE, HUMERUS - shaft;  Surgeon: Quinn Parker MD;  Location: [facility] OR [address] [place] [address];  Service: Orthopedics;  Laterality: Left;     Family History   Problem Relation Name Age of Onset    Heart disease Mother      Hypertension Mother      Skin cancer Mother       Social History[1]  Review of Systems    Physical Exam     Initial Vitals [07/31/25 0438]   BP Pulse Resp Temp SpO2   130/70 74 18 98.2 °F (36.8 °C) 99 %      MAP       --         Physical Exam    Constitutional: She is diaphoretic. She  appears distressed.   HENT:   Head: Normocephalic and atraumatic.   Eyes: EOM are normal.   Neck: Neck supple.   Cardiovascular:  Normal rate, regular rhythm and intact distal pulses.           Pulmonary/Chest: Breath sounds normal. No respiratory distress. She has no wheezes. She has no rhonchi. She has no rales.   Abdominal: Abdomen is soft. There is abdominal tenderness (Diffuse but mostly in the epigastric region).   Genitourinary: Rectum:      Guaiac result positive.   Guaiac positive stool. : Acceptable.   Genitourinary Comments: Rectal exam performed with chaperone showed some unformed stool and not impacted.  No hematochezia or melena.  External exam was remarkable for some hemorrhoids but not thrombosed     Musculoskeletal:      Cervical back: Neck supple.     Neurological: She is alert and oriented to person, place, and time.   Skin: Skin is warm. Capillary refill takes 2 to 3 seconds.         ED Course   Procedures  Labs Reviewed   COMPREHENSIVE METABOLIC PANEL - Abnormal       Result Value    Sodium 128 (*)     Potassium 3.5      Chloride 103      CO2 14 (*)     Glucose 129 (*)     BUN 27 (*)     Creatinine 2.0 (*)     Calcium 8.2 (*)     Protein Total 6.3      Albumin 3.1 (*)     Bilirubin Total 0.2      ALP 31 (*)     AST 25      ALT 14      Anion Gap 11      eGFR 27 (*)    CBC WITH DIFFERENTIAL - Abnormal    WBC 10.62      RBC 2.61 (*)     HGB 8.1 (*)     HCT 24.8 (*)     MCV 95      MCH 31.0      MCHC 32.7      RDW 14.3      Platelet Count 362      MPV 10.6      Nucleated RBC 0      Neut % 62.8      Lymph % 24.8      Mono % 8.5      Eos % 2.0      Basophil % 0.6      Imm Grans % 1.3 (*)     Neut # 6.68      Lymph # 2.63      Mono # 0.90      Eos # 0.21      Baso # 0.06      Imm Grans # 0.14 (*)    HEPATITIS C ANTIBODY - Normal    Hep C Ab Interp Non-Reactive     HIV 1 / 2 ANTIBODY - Normal    HIV 1/2 Ag/Ab Non-Reactive     PROTIME-INR - Normal    PT 11.4      INR 1.0     LIPASE -  "Normal    Lipase Level 43     CBC W/ AUTO DIFFERENTIAL    Narrative:     The following orders were created for panel order CBC auto differential.  Procedure                               Abnormality         Status                     ---------                               -----------         ------                     CBC with Differential[[id number]]       Abnormal            Final result                 Please view results for these tests on the individual orders.   HEP C VIRUS HOLD SPECIMEN   URINALYSIS, REFLEX TO URINE CULTURE   TYPE & SCREEN    Specimen Outdate 08/03/2025 23:59      Group & Rh A POS      Indirect [employer] NEG     PREPARE RBC SOFT    UNIT NUMBER [id number]      UNIT ABO/RH O NEG      DISPENSE STATUS Issued      Unit Expiration [id number]      Product Code G9554F11      Unit Blood Type Code 9500      CROSSMATCH INTERPRETATION Compatible      UNIT NUMBER [id number]      UNIT ABO/RH O NEG      DISPENSE STATUS Released      Unit Expiration [id number]      Product Code O9878X96      Unit Blood Type Code 9500      CROSSMATCH INTERPRETATION Not required            Imaging Results              CT Abdomen Pelvis  Without Contrast (No Result on File)                      X-Ray Chest 1 View (Final result)  Result time 07/31/25 06:57:56      Final result by Guy Thornton MD (07/31/25 06:57:56)                   Impression:      Enlarged cardiac silhouette.  No focal consolidation identified on this single view.      Electronically signed by: Guy Thornton MD  Date:    07/31/2025  Time:    06:57               Narrative:    EXAMINATION:  XR CHEST 1 VIEW    CLINICAL HISTORY:  Provided history is "  Hematemesis".    TECHNIQUE:  One view of the chest.    COMPARISON:  06/24/2018.    FINDINGS:  Cardiac wires overlie the chest.  Cardiomediastinal silhouette is enlarged and similar to the prior study.  Atherosclerotic calcifications overlie the aortic arch.  No focal consolidation.  No sizable " pleural effusion.  No pneumothorax.  Operative changes in the left proximal humerus.                                       Medications   0.9%  NaCl infusion (for blood administration) ( Intravenous Stopped 7/31/25 0604)   octreotide (SANDOSTATIN) 500 mcg in 0.9% NaCl 100 mL infusion (50 mcg/hr Intravenous New Bag 7/31/25 0541)   pantoprazole (PROTONIX) 40 mg in 0.9% NaCl 100 mL IVPB (MB+) (8 mg/hr Intravenous New Bag 7/31/25 0532)   cefTRIAXone injection 2 g (has no administration in time range)   droPERidol injection 1.25 mg (1.25 mg Intravenous Given 7/31/25 0523)   octreotide injection 50 mcg (50 mcg Intravenous Given 7/31/25 0523)   pantoprazole injection 80 mg (80 mg Intravenous Given 7/31/25 0524)     Medical Decision Making  Shannon Coleman is a 65 y.o. female  presenting to the ED with hematemesis. History and physical exam as above. Initial vital signs stable and non-actionable initially but worsened. Initial work-up to include:  See above    Differential diagnosis considered for this patient includes, but is not limited to:  Gastric ulcer, varices, hemoptysis.  Other severe, more emergent diagnoses considered, but deemed much less likely, to include:  Perforation of stomach or bowel.    Patient was given 1 unit of emergency release blood for her symptomatic anemia and active bleeding.  She was initially retching but improved after Zofran and droperidol.  After the unit of blood and fluids she stabilized and had no more active bleeding.  GFR was borderline so we will not get a CTA.  We will get a CT abdomen and pelvis dry to evaluate for colitis or diverticulitis for her diffuse abdominal pain.  Much more likely from the bleeding.  Discussed the case with GI and they will evaluate the patient and try to scope her in the morning.  They recommend the patient to be NPO and admitted to Hospital Medicine.  We discussed the case with Hospital Medicine and they will admit the patient to their service.    Amount  and/or Complexity of Data Reviewed  Labs: ordered. Decision-making details documented in ED Course.  Radiology: ordered.  ECG/medicine tests:  Decision-making details documented in ED Course.    Risk  Prescription drug management.  Decision regarding hospitalization.               ED Course as of 07/31/25 0715   Thu Jul 31, 2025   0504 EKG 12-lead  As per my independent interpretation normal sinus rhythm with a rate of 74 beats per minute, normal axis, normal intervals, no STEMI. [HJ]   0529 After my initial evaluation the patient became hypotensive and presyncopal.  Patient will be given 1 unit of emergency release blood and have a 2nd on hold.  Labs and imaging were ordered.  Consulted GI put in. [HJ]   1957 We discussed the case with gastroenterology and they state that the patient should be NPO and they will try to add the patient on to schedule for a scope. [HJ]   5435 Sodium(!): 128 [HJ]      ED Course User Index  [HJ] Javed Delgadillo MD                               Clinical Impression:  Final diagnoses:  [K92.2] GI bleed  [K92.0] Hematemesis          ED Disposition Condition    Admit                       [1]   Social History  Tobacco Use    Smoking status: Every Day     Current packs/day: 1.50     Types: Cigarettes    Smokeless tobacco: Never   Substance Use Topics    Alcohol use: Yes     Comment: 12 pack every 3 days    Drug use: No        Javed Delgadillo MD  Resident  07/31/25 0798

## 2025-08-01 ENCOUNTER — ANESTHESIA EVENT (OUTPATIENT)
Dept: ENDOSCOPY | Facility: HOSPITAL | Age: 65
DRG: 378 | End: 2025-08-01
Payer: MEDICARE

## 2025-08-01 ENCOUNTER — TELEPHONE (OUTPATIENT)
Dept: VASCULAR SURGERY | Facility: CLINIC | Age: 65
End: 2025-08-01
Payer: MEDICARE

## 2025-08-01 ENCOUNTER — ANESTHESIA (OUTPATIENT)
Dept: ENDOSCOPY | Facility: HOSPITAL | Age: 65
DRG: 378 | End: 2025-08-01
Payer: MEDICARE

## 2025-08-01 DIAGNOSIS — I70.0 AORTIC CALCIFICATION: Primary | ICD-10-CM

## 2025-08-01 LAB
ABO + RH BLD: NORMAL
ABSOLUTE EOSINOPHIL (OHS): 0.01 K/UL
ABSOLUTE MONOCYTE (OHS): 0.6 K/UL (ref 0.3–1)
ABSOLUTE NEUTROPHIL COUNT (OHS): 6.76 K/UL (ref 1.8–7.7)
ALBUMIN SERPL BCP-MCNC: 2.9 G/DL (ref 3.5–5.2)
ALP SERPL-CCNC: 27 UNIT/L (ref 40–150)
ALT SERPL W/O P-5'-P-CCNC: 11 UNIT/L (ref 0–55)
ANION GAP (OHS): 9 MMOL/L (ref 8–16)
AST SERPL-CCNC: 18 UNIT/L (ref 0–50)
BASOPHILS # BLD AUTO: 0.02 K/UL
BASOPHILS NFR BLD AUTO: 0.2 %
BILIRUB SERPL-MCNC: 0.2 MG/DL (ref 0.1–1)
BLD PROD TYP BPU: NORMAL
BLOOD UNIT EXPIRATION DATE: NORMAL
BLOOD UNIT TYPE CODE: 6200
BUN SERPL-MCNC: 33 MG/DL (ref 8–23)
CALCIUM SERPL-MCNC: 7.7 MG/DL (ref 8.7–10.5)
CHLORIDE SERPL-SCNC: 110 MMOL/L (ref 95–110)
CO2 SERPL-SCNC: 17 MMOL/L (ref 23–29)
CREAT SERPL-MCNC: 2.1 MG/DL (ref 0.5–1.4)
CROSSMATCH INTERPRETATION: NORMAL
DISPENSE STATUS: NORMAL
ERYTHROCYTE [DISTWIDTH] IN BLOOD BY AUTOMATED COUNT: 14.9 % (ref 11.5–14.5)
GFR SERPLBLD CREATININE-BSD FMLA CKD-EPI: 26 ML/MIN/1.73/M2
GLUCOSE SERPL-MCNC: 110 MG/DL (ref 70–110)
HCT VFR BLD AUTO: 20.7 % (ref 37–48.5)
HGB BLD-MCNC: 6.7 GM/DL (ref 12–16)
HOLD SPECIMEN: NORMAL
IMM GRANULOCYTES # BLD AUTO: 0.07 K/UL (ref 0–0.04)
IMM GRANULOCYTES NFR BLD AUTO: 0.8 % (ref 0–0.5)
LYMPHOCYTES # BLD AUTO: 1.41 K/UL (ref 1–4.8)
MAGNESIUM SERPL-MCNC: 1.5 MG/DL (ref 1.6–2.6)
MCH RBC QN AUTO: 30.3 PG (ref 27–31)
MCHC RBC AUTO-ENTMCNC: 32.4 G/DL (ref 32–36)
MCV RBC AUTO: 94 FL (ref 82–98)
NUCLEATED RBC (/100WBC) (OHS): 0 /100 WBC
PHOSPHATE SERPL-MCNC: 4.3 MG/DL (ref 2.7–4.5)
PLATELET # BLD AUTO: 257 K/UL (ref 150–450)
PMV BLD AUTO: 10.8 FL (ref 9.2–12.9)
POCT GLUCOSE: 146 MG/DL (ref 70–110)
POTASSIUM SERPL-SCNC: 4.1 MMOL/L (ref 3.5–5.1)
PROT SERPL-MCNC: 5.6 GM/DL (ref 6–8.4)
RBC # BLD AUTO: 2.21 M/UL (ref 4–5.4)
RELATIVE EOSINOPHIL (OHS): 0.1 %
RELATIVE LYMPHOCYTE (OHS): 15.9 % (ref 18–48)
RELATIVE MONOCYTE (OHS): 6.8 % (ref 4–15)
RELATIVE NEUTROPHIL (OHS): 76.2 % (ref 38–73)
SODIUM SERPL-SCNC: 136 MMOL/L (ref 136–145)
UNIT NUMBER: NORMAL
WBC # BLD AUTO: 8.87 K/UL (ref 3.9–12.7)

## 2025-08-01 PROCEDURE — P9016 RBC LEUKOCYTES REDUCED: HCPCS

## 2025-08-01 PROCEDURE — 20600001 HC STEP DOWN PRIVATE ROOM

## 2025-08-01 PROCEDURE — 86920 COMPATIBILITY TEST SPIN: CPT

## 2025-08-01 PROCEDURE — 85025 COMPLETE CBC W/AUTO DIFF WBC: CPT

## 2025-08-01 PROCEDURE — 63600175 PHARM REV CODE 636 W HCPCS

## 2025-08-01 PROCEDURE — 25000003 PHARM REV CODE 250: Performed by: NURSE ANESTHETIST, CERTIFIED REGISTERED

## 2025-08-01 PROCEDURE — 0DJ08ZZ INSPECTION OF UPPER INTESTINAL TRACT, VIA NATURAL OR ARTIFICIAL OPENING ENDOSCOPIC: ICD-10-PCS | Performed by: INTERNAL MEDICINE

## 2025-08-01 PROCEDURE — 25000003 PHARM REV CODE 250

## 2025-08-01 PROCEDURE — D9220A PRA ANESTHESIA: Mod: CRNA,,, | Performed by: NURSE ANESTHETIST, CERTIFIED REGISTERED

## 2025-08-01 PROCEDURE — D9220A PRA ANESTHESIA: Mod: ANES,,, | Performed by: ANESTHESIOLOGY

## 2025-08-01 PROCEDURE — 83735 ASSAY OF MAGNESIUM: CPT

## 2025-08-01 PROCEDURE — 63600175 PHARM REV CODE 636 W HCPCS: Performed by: NURSE ANESTHETIST, CERTIFIED REGISTERED

## 2025-08-01 PROCEDURE — 36430 TRANSFUSION BLD/BLD COMPNT: CPT

## 2025-08-01 PROCEDURE — 80053 COMPREHEN METABOLIC PANEL: CPT

## 2025-08-01 PROCEDURE — 84100 ASSAY OF PHOSPHORUS: CPT

## 2025-08-01 PROCEDURE — 94761 N-INVAS EAR/PLS OXIMETRY MLT: CPT

## 2025-08-01 PROCEDURE — 36415 COLL VENOUS BLD VENIPUNCTURE: CPT

## 2025-08-01 RX ORDER — PROPOFOL 10 MG/ML
VIAL (ML) INTRAVENOUS
Status: DISCONTINUED | OUTPATIENT
Start: 2025-08-01 | End: 2025-08-04

## 2025-08-01 RX ORDER — SUCCINYLCHOLINE CHLORIDE 20 MG/ML
INJECTION INTRAMUSCULAR; INTRAVENOUS
Status: DISCONTINUED | OUTPATIENT
Start: 2025-08-01 | End: 2025-08-04

## 2025-08-01 RX ORDER — ONDANSETRON HYDROCHLORIDE 2 MG/ML
4 INJECTION, SOLUTION INTRAVENOUS DAILY PRN
OUTPATIENT
Start: 2025-08-01

## 2025-08-01 RX ORDER — GLUCAGON 1 MG
1 KIT INJECTION
OUTPATIENT
Start: 2025-08-01

## 2025-08-01 RX ORDER — ERYTHROMYCIN 250 MG/1
250 TABLET, DELAYED RELEASE ORAL ONCE
Status: COMPLETED | OUTPATIENT
Start: 2025-08-01 | End: 2025-08-01

## 2025-08-01 RX ORDER — MAGNESIUM SULFATE HEPTAHYDRATE 40 MG/ML
2 INJECTION, SOLUTION INTRAVENOUS ONCE
Status: COMPLETED | OUTPATIENT
Start: 2025-08-01 | End: 2025-08-01

## 2025-08-01 RX ORDER — LIDOCAINE HYDROCHLORIDE 20 MG/ML
INJECTION INTRAVENOUS
Status: DISCONTINUED | OUTPATIENT
Start: 2025-08-01 | End: 2025-08-04

## 2025-08-01 RX ORDER — HYDROCODONE BITARTRATE AND ACETAMINOPHEN 500; 5 MG/1; MG/1
TABLET ORAL
Status: DISCONTINUED | OUTPATIENT
Start: 2025-08-01 | End: 2025-08-01

## 2025-08-01 RX ADMIN — MAGNESIUM SULFATE HEPTAHYDRATE 2 G: 40 INJECTION, SOLUTION INTRAVENOUS at 09:08

## 2025-08-01 RX ADMIN — PANTOPRAZOLE SODIUM 40 MG: 40 INJECTION, POWDER, FOR SOLUTION INTRAVENOUS at 09:08

## 2025-08-01 RX ADMIN — PROPOFOL 250 MCG/KG/MIN: 10 INJECTION, EMULSION INTRAVENOUS at 03:08

## 2025-08-01 RX ADMIN — PROPOFOL 50 MG: 10 INJECTION, EMULSION INTRAVENOUS at 03:08

## 2025-08-01 RX ADMIN — ATORVASTATIN CALCIUM 40 MG: 40 TABLET, FILM COATED ORAL at 09:08

## 2025-08-01 RX ADMIN — ERYTHROMYCIN 250 MG: 250 TABLET, DELAYED RELEASE ORAL at 12:08

## 2025-08-01 RX ADMIN — PROPOFOL 150 MG: 10 INJECTION, EMULSION INTRAVENOUS at 03:08

## 2025-08-01 RX ADMIN — SUCCINYLCHOLINE CHLORIDE 140 MG: 20 INJECTION, SOLUTION INTRAMUSCULAR; INTRAVENOUS; PARENTERAL at 03:08

## 2025-08-01 RX ADMIN — PAROXETINE HYDROCHLORIDE 40 MG: 10 TABLET, FILM COATED ORAL at 09:08

## 2025-08-01 RX ADMIN — SODIUM CHLORIDE: 0.9 INJECTION, SOLUTION INTRAVENOUS at 03:08

## 2025-08-01 RX ADMIN — LIDOCAINE HYDROCHLORIDE 100 MG: 20 INJECTION INTRAVENOUS at 03:08

## 2025-08-01 NOTE — ASSESSMENT & PLAN NOTE
Patient's blood pressure range in the last 24 hours was: BP  Min: 106/72  Max: 163/76.The patient's inpatient anti-hypertensive regimen is listed below:  Current Antihypertensives       Plan  - BP is controlled, no changes needed to their regimen  - will add on home antihypertensive medications as indicated

## 2025-08-01 NOTE — PROVATION PATIENT INSTRUCTIONS
Discharge Summary/Instructions after an Endoscopic Procedure  Patient Name: Shannon Coleman  Patient MRN: [id number]  Patient YOB: 1960 Friday, August 1, 2025  Reese Gill MD  Dear patient,  As a result of recent federal legislation (The Federal Cures Act), you may   receive lab or pathology results from your procedure in your [facility]   account before your physician is able to contact you. Your physician or   their representative will relay the results to you with their   recommendations at their soonest availability.  Thank you,  RESTRICTIONS:  During your procedure today, you received medications for sedation.  These   medications may affect your judgment, balance and coordination.  Therefore,   for 24 hours, you have the following restrictions:   - DO NOT drive a car, operate machinery, make legal/financial decisions,   sign important papers or drink alcohol.    ACTIVITY:  Today: no heavy lifting, straining or running due to procedural   sedation/anesthesia.  The following day: return to full activity including work.  DIET:  Eat and drink normally unless instructed otherwise.     TREATMENT FOR COMMON SIDE EFFECTS:  - Mild abdominal pain, nausea, belching, bloating or excessive gas:  rest,   eat lightly and use a heating pad.  - Sore Throat: treat with throat lozenges and/or gargle with warm salt   water.  - Because air was used during the procedure, expelling large amounts of air   from your rectum or belching is normal.  - If a bowel prep was taken, you may not have a bowel movement for 1-3 days.    This is normal.  SYMPTOMS TO WATCH FOR AND REPORT TO YOUR PHYSICIAN:  1. Abdominal pain or bloating, other than gas cramps.  2. Chest pain.  3. Back pain.  4. Signs of infection such as: chills or fever occurring within 24 hours   after the procedure.  5. Rectal bleeding, which would show as bright red, maroon, or black stools.   (A tablespoon of blood from the rectum is not serious, especially if    hemorrhoids are present.)  6. Vomiting.  7. Weakness or dizziness.  GO DIRECTLY TO THE NEAREST EMERGENCY ROOM IF YOU HAVE ANY OF THE FOLLOWING:      Difficulty breathing              Chills and/or fever over 101 F   Persistent vomiting and/or vomiting blood   Severe abdominal pain   Severe chest pain   Black, tarry stools   Bleeding- more than one tablespoon   Any other symptom or condition that you feel may need urgent attention  Your doctor recommends these additional instructions:  If any biopsies were taken, your doctors clinic will contact you in 1 to 2   weeks with any results.  - Return patient to hospital [facility] for ongoing care.   - Resume previous diet.   - Continue present medications.   - Perform an upper endoscopic ultrasound (UEUS) at appointment to be   scheduled.   - The findings and recommendations were discussed with the patient.  For questions, problems or results please call your physician - Reese Gill MD at Work:  [phone].  [facility], EMERGENCY ROOM PHONE NUMBER: [phone]  IF A COMPLICATION OR EMERGENCY SITUATION ARISES AND YOU ARE UNABLE TO REACH   YOUR PHYSICIAN - GO DIRECTLY TO THE EMERGENCY ROOM.  Reese Gill MD  8/1/2025 6:15:54 PM  This report has been verified and signed electronically.  Dear patient,  As a result of recent federal legislation (The Federal Cures Act), you may   receive lab or pathology results from your procedure in your [facility]   account before your physician is able to contact you. Your physician or   their representative will relay the results to you with their   recommendations at their soonest availability.  Thank you,  PROVATION

## 2025-08-01 NOTE — ANESTHESIA POSTPROCEDURE EVALUATION
Anesthesia Post Evaluation    Patient: Shannon Coleman    Procedure(s) Performed: Procedure(s) (LRB):  EGD (ESOPHAGOGASTRODUODENOSCOPY) (N/A)    Final Anesthesia Type: general      Patient location during evaluation: PACU  Patient participation: Yes- Able to Participate  Level of consciousness: awake and alert  Post-procedure vital signs: reviewed and stable  Pain management: adequate  Airway patency: patent    PONV status at discharge: No PONV  Anesthetic complications: no      Cardiovascular status: blood pressure returned to baseline  Respiratory status: unassisted, room air and spontaneous ventilation  Hydration status: euvolemic  Follow-up not needed.            See PACU vitals        Event Time   Out of Recovery 18:37:40         Pain/Becca Score: Becca Score: 10 (7/31/2025  4:30 PM)

## 2025-08-01 NOTE — HOSPITAL COURSE
CT abd Mild diffuse gastric wall thickening, nonspecific, correlate for possible gastritis. Small hiatal hernia. Advanced calcific atherosclerotic plaque.  Prominent intraluminal calcific plaque within the distal abdominal aorta and proximal common iliac arteries concerning for severe narrowing. EGD 7/31 was made difficult due to some retained food products. Following EGD the patient was not fully aware of clear fluid diet and was brought some eggs and coffee by family. Her hemoglobin resulted low again at 6.7 which raises concern for persistent GIB. She was made NPO, blood consent filed, 1 unit pRBC ordered, and GI planning to rescope this afternoon after a morning dose of erythromycin to aid in gastric emptying. Second scope visualized an inflamed avn that was like source of bleeding but didn't require any acute intervention. Her hemoglobin remained stable over night. We are planning to discharge today now that she is medically stable with a litany of follow ups.     Most importantly to follow up urgently with GI and Internal Medicine PCP for CBC to monitor for any sequela or continued bleeding. As well as starting home protonix 40mg daily (in place of her previous omeprazole), and avoiding aspirin and NSAIDs for her infrequent headaches. Her PCP will also aid in any referrals or follow up on previous referrals from the hospitalization.    We also recommend in the near future seeing [facility], for reevaluation of her gastric anatomy and to scan for any abnormalities that may be influencing her recent GIB. As well as cardiology for her previous echo findings, case management to aid in coordination of care, and vascular surgery to address the CT findings related to her constricted abdominal aorta and iliacs.     Lastly, we ordered referrals to our smoking cessation program, distant referral to Pulmonology for continued observation of a small lung nodule found on CT in a patient who is at higher  risk, and Nephrology to trend her kidney function and treat appropriately with long term follow ups.

## 2025-08-01 NOTE — PLAN OF CARE
Problem: Hospitalized Older Adult  Goal: Optimal Cognitive Function  Outcome: Progressing  Goal: Effective Bowel Elimination  Outcome: Progressing  Goal: Optimal Coping  Outcome: Progressing  Goal: Effective Urinary Elimination  Outcome: Progressing     Problem: Adult Inpatient Plan of Care  Goal: Plan of Care Review  Outcome: Progressing

## 2025-08-01 NOTE — MEDICAL/APP STUDENT
"[facility] - Emergency Dept  [facility] Medicine  Progress Notes    Patient Name: Shannon Coleman  MRN: [id number]  Patient Class: IP- Inpatient   Admission Date: 7/31/2025  Attending Physician: Elizabeth Gore MD   Primary Care Provider: Wagner Aragon MD        Patient information was obtained from patient, spouse/SO, relative(s), EMS personnel, past medical records, and ER records.     Subjective:     HPI: Shannon Coleman is a 64 y/o female with a pmh of HLD, HTN, pre-diabetes, GERD, depression, vitamin d deficiency, nasal skin cancer s/p removal, and tobacco and EtOH use presenting to [facility] ED for hematemesis. She states that she had two episodes around 10:30 pm last night that were bright red and large volume, and one episode around her presentation to ED that was smaller. Before this occurred, she was drinking approximately 6 beers and took 1 pack of BC powder for a headache. She then went to the bathroom d/t nausea and began having emesis and had an episode of diarrhea after. She denies dizziness or syncope. She denies history of hematemesis or dark stools. She reports that she drinks "a couple beers" 3 times a week. She takes BC powder 3 times a week for headache. She smokes 1 pack a day and has a 30 pack year history. She denies CP, SOB, recent illness or sick contacts, numbness, and any known hx of cardiac, liver, or kidney problems. She reports her last colonoscopy was "10 years ago". Her last echo showed moderate aortic stenosis and an EF of 43%.     She reports that she has "pre-diabetes" and takes metformin. She has a history of bilateral knee arthritis that causes "burning pain" when she walks. She is on atorvastatin and fenofibrate for HLD. She is on amlodipine and toprol for HTN. She reports taking "1/2 a klonopin" per night for sleep, but hasn't filled it this year per CC. She takes vitamin D and fish oil. She has a family history significant for cardiovascular disease. She reports that her PCP of 15 years " moved recently and she has been having trouble finding a new one and filling her medications.    In the ED: afebrile, BP 87/52, symptomatic. Hb 8.1. Na 128, CO2 14, BUN 27, Cr 2, glucose 129, ALP 31. Bili, AST, ALT, lipase wnl. Hep C, HIV negative. EKG showed NSR and prolonged QT (505s). CXR showed stable cardiomegaly and no focal consolidation. CT AP noncon showed small hiatal hernia and no acute GI process.  Given 1 unit pbrc.Given 2g ceftriaxone, droperidol, ondansetron, IV 80 mg pantoprazole, and octreotide, and one dose of lorazepam for CT anxiety.       HPI: Hb 8.1 in ED. Given one unit Pbrc. GI consulted. EGD 7/31 showed a non bleeding angiodysplasia in the small intestine and stomach was hard to visualize. Reported one dark BM next day and did not adhere to clear liquid diet. Hb 6.7 8/1. One unit pbrc given. Plan for repeat EGD 8/1.     Interval: EGD 7/31 did not find an active bleeding source. Reports that she was unaware about her clear liquids diet and had diet coke last night, coffee this AM, and was eating breakfast. Reports one dark stool last night. Hb dropped to 6.7, given blood. No lightheadness, dizziness, n/v, diarrhea, CP, or SOB this morning. Does not endorse symptoms of withdrawal.       Past Medical History:   Diagnosis Date    Allergy     Anxiety     Arthritis     Depression     GERD (gastroesophageal reflux disease)     Hyperlipidemia     Hypertension     Tobacco abuse     Vitamin D deficiency disease        Past Surgical History:   Procedure Laterality Date    child birth      ESOPHAGOGASTRODUODENOSCOPY N/A 7/31/2025    Procedure: EGD (ESOPHAGOGASTRODUODENOSCOPY);  Surgeon: Reese Gill MD;  Location: [facility] ([address] [place] [address]);  Service: Endoscopy;  Laterality: N/A;    OPEN REDUCTION AND INTERNAL FIXATION (ORIF) OF FRACTURE OF PROXIMAL HUMERUS Left 6/28/2018    Procedure: ORIF, HUMERUS, PROXIMAL- synthes- left- c arm;  Surgeon: Quinn Parker MD;  Location: [facility] OR [address] [place] [address];  Service:  Orthopedics;  Laterality: Left;    ORIF HUMERUS FRACTURE Left 6/28/2018    Procedure: ORIF, FRACTURE, HUMERUS - shaft;  Surgeon: Quinn Parker MD;  Location: [facility] OR [address] [place] [address];  Service: Orthopedics;  Laterality: Left;       Review of patient's allergies indicates:  No Known Allergies    No current facility-administered medications on file prior to encounter.     Current Outpatient Medications on File Prior to Encounter   Medication Sig    amLODIPine (NORVASC) 10 MG tablet Take 1 tablet (10 mg total) by mouth once daily.    atorvastatin (LIPITOR) 40 MG tablet Take 1 tablet (40 mg total) by mouth once daily.    clonazePAM (KLONOPIN) 1 MG tablet Take 1 tablet (1 mg total) by mouth every evening.    ergocalciferol (ERGOCALCIFEROL) 50,000 unit Cap Take 1 capsule (50,000 Units total) by mouth every 7 days. (Patient taking differently: Take 50,000 Units by mouth every 7 days. Wednesday)    fenofibrate 160 MG Tab TAKE 1 TABLET(160 MG) BY MOUTH DAILY AS NEEDED    losartan-hydrochlorothiazide 100-25 mg (HYZAAR) 100-25 mg per tablet Take 1 tablet by mouth once daily.    metFORMIN (GLUCOPHAGE) 500 MG tablet TAKE 1 TABLET BY MOUTH EVERY DAY WITH BREAKFAST    metoprolol succinate (TOPROL-XL) 100 MG 24 hr tablet Take 1 tablet (100 mg total) by mouth once daily.    MULTIVITS,CA,MINERALS/IRON/FA (ONE-A-DAY WOMENS FORMULA ORAL) Take 1 tablet by mouth once daily.    omega-3 fatty acids-vitamin E (FISH OIL) 1,000 mg Cap Take 2 capsules by mouth once daily.    omeprazole (PRILOSEC) 40 MG capsule Take 1 capsule (40 mg total) by mouth every morning.    paroxetine (PAXIL) 40 MG tablet TAKE 1 TABLET(40 MG) BY MOUTH DAILY    triamcinolone acetonide 0.1% (KENALOG) 0.1 % ointment Apply 1 application  topically 2 (two) times daily. Apply to affected area    blood pressure test kit-large Kit 1 each by Misc.(Non-Drug; Combo Route) route 2 (two) times a day.    walker (ULTRA-LIGHT ROLLATOR) Misc 1 each by Misc.(Non-Drug; Combo Route) route once  daily.     Family History       Problem Relation (Age of Onset)    Heart disease Mother    Hypertension Mother    Skin cancer Mother          Tobacco Use    Smoking status: Every Day     Current packs/day: 1.50     Types: Cigarettes    Smokeless tobacco: Never   Substance and Sexual Activity    Alcohol use: Yes     Comment: 12 pack every 3 days    Drug use: No    Sexual activity: Yes     Partners: Male     Review of Systems   Constitutional:  Negative for fever and unexpected weight change.   Respiratory:  Negative for cough and shortness of breath.    Cardiovascular:  Negative for chest pain and leg swelling.   Gastrointestinal:  Positive for abdominal pain (resolving), blood in stool (reported dark stools), nausea and vomiting (3 episodes of hematemesis). Negative for constipation and diarrhea.   Genitourinary: Negative.    Musculoskeletal:  Positive for arthralgias (bilateral knees).   Neurological:  Negative for dizziness, weakness, light-headedness and numbness.     Objective:   [unfilled]Weight: 98.9 kg (218 lb)  Body mass index is 34.14 kg/m².    Vitals:    08/01/25 0700   BP:    Pulse: 95   Resp:    Temp:        Physical Exam  Constitutional:       General: She is not in acute distress.  Cardiovascular:      Heart sounds: Murmur (systolic ejection murmur) heard.   Pulmonary:      Effort: Pulmonary effort is normal.      Breath sounds: Normal breath sounds.   Abdominal:      Palpations: Abdomen is soft.      Tenderness: There is abdominal tenderness (epigastric).   Musculoskeletal:         General: No swelling.      Right lower leg: No edema.      Left lower leg: No edema.   Skin:     General: Skin is warm and dry.      Findings: No bruising.   Neurological:      Mental Status: She is alert.       Significant Labs: All pertinent labs within the past 24 hours have been reviewed.    Significant Imaging: I have reviewed all pertinent imaging results/findings within the past 24 hours.    Assessment/Plan:       Hematemesis  Patient presents with a hx of three episodes of hematemesis last night and today morning in the ED. Reports bright red and large volume. She denies hx of melena and hematemesis. She reports she drank 6 beers before this and used 1 pack of BC powder, both of which she regularly does. She is also a current smoker with a 30 pack year history.    -CXR showed no acute process  -CT AP non con obtained showed no acute GI process, normal appearing liver, small hiatal hernia, mld diffuse gastric wall thickening, advanced calcific atherosclerotic plaque in abdominal and proximal common iliac arteries, 5.3 cm adrenal lesion, and left lower lobe pulmonary micronodule  - Bolus IV pantoprazole 80mg followed by 40mg BID  -Octeotride in ED, DC d/t CT AP noncon showing no liver disease  - no need for abx at this time, 2g cef in ED  - Trend Hgb q24hrs. Transfuse for Hgb > 7, unless otherwise indicated  - Maintain IV access with 2 large bore Ivs  - Intravascular resuscitation/support with IVFs   - Hold all NSAIDs and anticoagulants, unless contraindicated  - Please correct any coagulopathy with platelets and FFP for goal of platelets >50K and INR <2.0  -EGD 7/31 did not show active bleeding. Hard to visualize stomach due to food bolus. One nonbleeding small intestine angiodysplasia visualized.  -Hb dropped to 6.7 8/1, plan for repeat EGD   -one dose erythromycin 250 mg per GI recs, continuing IV protonix 40 mg BID      2. Symptomatic Anemia  Patient presented to ED with BP 87/52 and Hb 8.1. Reported dizziness and lightheadedness.    -Hb dropped to 6.7 8/1 from 8.1. Asymptomatic. One unit pbrc given.   -Trend Hgb q24hrs. Transfuse for Hgb < 7, unless otherwise indicated    3. Hyponatremia-resolved 8/1  Patient presents with Na 128 in ED. Likely hypovolemic hyponatremia 2/2 hematemesis or alcohol. Patient takes paroxetine, SIADH on ddx, less likely    Lab Results   Component Value Date     08/01/2025     -Resolved  "8/1  -Consider serum osm, urine studies if symptoms develop or Na worsens    4. Elevated Cr  Patient presents with BUN of 27 and Cr of 2. Per Ccm her Cr has been rising and she likely has baseline CKD. If this is an TJ, could be d/t prerenal causes 2/2 hematemesis or poor PO intake. GIB also causes rise in BUN. UA noninfectious    -Cr stable 2.1 (2 7/31), could be her new baseline  -Trend CMPs Q24  -avoid nephrotoxic meds, NSAIDs  -Monitor UOP, I and Os  -Consider f/u OP with nephrology    5. HTN  Patient has history of HTN.  SBP max in 190s in ED. Asymptomatic. Takes amlodipine and toprol at home. likely "white coat" HTN    -stable   -continue to monitor, holding home regimen for now    6. HLD  Patient has history of HLD on atorvastatin and fenofibrate at home.    -Continue atorvastatin 40 mg  -Renal dosing for fenofibrate-48 mg, d/c'd due to drug interactions in setting of acute blood loss anemia 8/1  -monitor CrCl    7. Depression  Patient has history of mood disorder and anxiety.    -continue home paroxetine 40 mg.     8. Pre-diabetes  Patient has history of pre-diabetes, last A1Cs 6 and 6.5. Has had A1Cs over 7 in the past.    -glucose is stable  -holding home metformin IP  -Diabetic Diet after EGD    9. Tobacco Use  Patient reports smoking one pack a day for 30 years. Reports possible attempts to quit in past.    -no concerns this AM 8/1  -nicotine patch 21 mg PRN   -smoking cessation counseling  -resources offered for hospital assistance program    10. EtOH   Patient reports hx of "6 beers a day 3x a week".      -CIWA score 8/1 0, no concerns  -CIWA scores, will monitor and consider need for benzo PRN  -counseling  -F/u OP with PCP or other resources    10. Hx of Clonazepam use  Patient reports hx of use for "sleep" and reports she takes "1/2" tablet per night. No recent fill hx per CC.    -continue to monitor, no acute concerns     12. AS with EF 40-45%  Most recent echo from 2023 showed she had moderate AS " "and an EF of 40-45%. Systolic murmur present on exam.    -Continue to monitor, no acute concerns  -F/u OP with PCP or cardiology    13. Atherosclerotic plaque in AA  CT AP noncon found "Prominent intraluminal calcific plaque within the distal abdominal aorta and proximal common iliac arteries concerning for severe narrowing"    -No acute concerns, f/u with Vascular OP with possible CTA  -continue to monitor    14. Adrenal Nodule  CT AP noncon found a 5.3 cm lesion on left adrenal.    -No acute concerns, f/u OP with endocrinology    15. Lung Nodule  CT AP noncon found a left lower lobe pulmonary nodule    -No acute concerns, f/u OP with pulmonology    16. Debility  Patient reports difficulty walking and burning pains. She states that sometimes she uses a roller. CTAP this admission showed significant atherosclerosis of her AA and common iliac arteries.    -Consider PT/OT eval, OP sessions        VTE Risk Mitigation (From admission, onward)           Ordered     IP VTE HIGH RISK PATIENT  Once         07/31/25 0718     Place sequential compression device  Until discontinued         07/31/25 0718                  .      Kalani Tracey, MS4  "

## 2025-08-01 NOTE — TELEPHONE ENCOUNTER
----- Message from Tee Velasco MD sent at 8/1/2025 10:28 AM CDT -----  Unfortunately there is no other way to delineate her arterial anatomy other than angiography which comes with its own stroke/injury risk.  Recommend gentle prehydration and post-hydration.  Thank you.  ----- Message -----  From: Carla Zavala RN  Sent: 8/1/2025  10:24 AM CDT  To: Tee Velasco MD    Pt's creatinine 2.1 on 8/1/25 and 2.0 on 7/31/25. Ok to proceed?  ----- Message -----  From: Tee Velasco MD  Sent: 8/1/2025   6:01 AM CDT  To: Carla Zavala RN    CTA A/P would be great.  Thanks.  Can see next week if possible.  ----- Message -----  From: Carla Zavala RN  Sent: 7/31/2025   4:30 PM CDT  To: Tee Velasco MD    Pt referred for aortic calcification. She had a CT abd/pelvis today that showed: Advanced calcific atherosclerotic plaque.  Prominent intraluminal calcific plaque within the distal abdominal aorta and proximal common iliac arteries concerning for severe narrowing.  Incompletely characterized on noncontrast exam.  Consider further evaluation with CTA when clinically appropriate.    Did you want additional imaging for her?

## 2025-08-01 NOTE — ANESTHESIA PROCEDURE NOTES
Intubation    Date/Time: 8/1/2025 3:47 PM    Performed by: Lombardi, Nicole A., CRNA  Authorized by: Ondina Medrano MD    Intubation:     Induction:  Rapid sequence induction    Intubated:  Postinduction    Mask Ventilation:  Not attempted    Attempts:  1    Attempted By:  CRNA    Method of Intubation:  Video laryngoscopy    Blade:  Watts 3    Laryngeal View Grade: Grade I - full view of cords      Difficult Airway Encountered?: No      Complications:  None    Airway Device:  Oral endotracheal tube    Airway Device Size:  7.0    Style/Cuff Inflation:  Cuffed    Inflation Amount (mL):  7    Tube secured:  21    Secured at:  The lips    Placement Verified By:  Capnometry and Revisualization with laryngoscopy    Complicating Factors:  None    Findings Post-Intubation:  BS equal bilateral and atraumatic/condition of teeth unchanged

## 2025-08-01 NOTE — ASSESSMENT & PLAN NOTE
Hyponatremia is likely due to Dehydration/hypovolemia. The patient's most recent sodium results are listed below.  Recent Labs     07/31/25  0513 08/01/25  0529   * 136       Plan  - Monitor sodium Daily   - Patient hyponatremia is stable  - Like due to hypovolemia, assessed fluid status will trend on future labs

## 2025-08-01 NOTE — SUBJECTIVE & OBJECTIVE
Interval History: NAEO, afebrile VSS on RA. Hgb this am was back low at 6.7 1unit ordered. Pt had one dark stool yesterday like to her previous hemoptysis spell.  and daughter at bedside, all acknowledging plan and NPO status.     Review of Systems   Constitutional:  Negative for chills, diaphoresis, fatigue and fever.   HENT:  Negative for nosebleeds and sore throat.    Respiratory:  Negative for apnea, cough, choking and shortness of breath.    Gastrointestinal:  Negative for abdominal distention, abdominal pain, anal bleeding, blood in stool, constipation, diarrhea, nausea, rectal pain and vomiting.   Genitourinary:  Negative for difficulty urinating and flank pain.   Musculoskeletal:  Negative for arthralgias, back pain, joint swelling and myalgias.   Neurological:  Negative for headaches.     Objective:     Vital Signs (Most Recent):  Temp: 98 °F (36.7 °C) (08/01/25 1116)  Pulse: 81 (08/01/25 1116)  Resp: 18 (08/01/25 0823)  BP: (!) 150/84 (08/01/25 1116)  SpO2: 99 % (08/01/25 1116) Vital Signs (24h Range):  Temp:  [97.7 °F (36.5 °C)-98.8 °F (37.1 °C)] 98 °F (36.7 °C)  Pulse:  [69-95] 81  Resp:  [17-22] 18  SpO2:  [92 %-99 %] 99 %  BP: (106-163)/(69-99) 150/84     Weight: 98.9 kg (218 lb)  Body mass index is 34.14 kg/m².    Intake/Output Summary (Last 24 hours) at 8/1/2025 1121  Last data filed at 7/31/2025 1552  Gross per 24 hour   Intake 250 ml   Output --   Net 250 ml         Physical Exam  Vitals and nursing note reviewed.   Constitutional:       General: She is not in acute distress.     Appearance: Normal appearance. She is obese. She is not ill-appearing.   HENT:      Head: Normocephalic and atraumatic.      Mouth/Throat:      Pharynx: No oropharyngeal exudate or posterior oropharyngeal erythema.   Eyes:      General: No scleral icterus.     Extraocular Movements: Extraocular movements intact.      Pupils: Pupils are equal, round, and reactive to light.   Cardiovascular:      Rate and Rhythm:  Normal rate and regular rhythm.      Pulses: Normal pulses.      Heart sounds: Murmur heard.      No friction rub. No gallop.   Pulmonary:      Effort: Pulmonary effort is normal.      Breath sounds: Normal breath sounds.   Abdominal:      General: There is no distension.      Palpations: Abdomen is soft. There is no mass.      Tenderness: There is no abdominal tenderness.      Hernia: No hernia is present.   Musculoskeletal:         General: No swelling or tenderness.      Right lower leg: No edema.      Left lower leg: No edema.   Skin:     Capillary Refill: Capillary refill takes less than 2 seconds.      Coloration: Skin is not jaundiced.   Neurological:      General: No focal deficit present.      Mental Status: She is alert and oriented to person, place, and time. Mental status is at baseline.               Significant Labs: All pertinent labs within the past 24 hours have been reviewed.  CBC:   Recent Labs   Lab 07/31/25  0513 08/01/25  0529   WBC 10.62 8.87   HGB 8.1* 6.7*   HCT 24.8* 20.7*    257       Significant Imaging: I have reviewed all pertinent imaging results/findings within the past 24 hours.  EGD                         - Normal esophagus.                          - Medium-sized hiatal hernia.                          - A large amount of food (residue) mixed with clot                          in the stomach.                          - A single non-bleeding angiodysplastic lesion in                          the stomach. No stigmata of bleeding, but could be                          a potential bleeding source.                          - Congested mucosa in the gastric body.                          - Normal examined duodenum.                          - No specimens collected.

## 2025-08-01 NOTE — TREATMENT PLAN
GI Treatment Plan:    Impression:            - Normal esophagus.   - Medium-sized hiatal hernia.   - A large amount of food (residue) mixed with clot in the stomach.   - A single non-bleeding angiodysplastic lesion in  the stomach. No stigmata of bleeding, but could be a potential bleeding source.   - Congested mucosa in the gastric body.   - Normal examined duodenum.    - No specimens collected.     Recommendation:  - Return patient to hospital [facility] for ongoing care.   - Clear liquid diet.   - Continue present medications.   - Repeat EGD if she continues to have hematemesis as visualization of the stomach was limited given large amount of food present.     GI will follow.    Ryan John MD  Gastroenterology Fellow PGY IV  [facility]

## 2025-08-01 NOTE — PROGRESS NOTES
"[facility] - Telemetry [facility] Medicine  Progress Note    Patient Name: Shannon Coleman  MRN: [id number]  Patient Class: IP- Inpatient   Admission Date: 7/31/2025  Length of Stay: 1 days  Attending Physician: Elizabeth Gore MD  Primary Care Provider: Wagner Aragon MD        Subjective     Principal Problem:GIB (gastrointestinal bleeding)        HPI:  Shannon Coleman is a 66 y/o female with a PMH of moderate AS noted on previous echo, HLD, HTN, pre-diabetes, GERD, depression, nasal skin cancer s/p removal, tobacco and EtOH use presenting to [facility] ED for hematemesis. She states that she had two episodes around 10:30 pm last night that were bright red and large volume, and one episode around her presentation to ED that was smaller. Before this occurred, she was drinking approximately 6 beers and was took 1 pack of BC powder for a headache. She then went to the bathroom do to nausea and began having emesis and had an episode of diarrhea after. She denies dizziness or syncope. She denies history of hematemesis or dark stools. She reports that she drinks "a couple beers" 3 times a week. She takes BC powder 3 times a week for headache. She smokes 1 pack a day and has a 30 pack year history. She denies CP, SOB, recent illness or sick contacts, numbness, and any known hx of cardiac, liver, or kidney problems. She recently lost her PCP due to them switching locations so is also beginning to run out her home medications.     In the ED: afebrile, BP 87/52, symptomatic. Hb 8.1. Na 128, CO2 14, BUN 27, Cr 2, glucose 129, ALP 31. Bili, AST, ALT, lipase wnl. Hep C, HIV negative. EKG showed NSR and prolonged QT (505s). CXR showed stable cardiomegaly and no focal consolidation. CT AP noncon pending. Given 1 unit pbrc.Given 2g ceftriaxone, droperidol, ondansetron, pantoprazole, and octreotide, and one dose of lorazepam for CT anxiety.     Overview/Hospital Course:  CT abd Mild diffuse gastric wall thickening, nonspecific, " correlate for possible gastritis. Small hiatal hernia. Advanced calcific atherosclerotic plaque.  Prominent intraluminal calcific plaque within the distal abdominal aorta and proximal common iliac arteries concerning for severe narrowing. EGD 7/31 was made difficult due to some retained food products. Following EGD the patient was not fully aware of clear fluid diet and was brought some eggs and coffee by family. Her hemoglobin resulted low again at 6.7 which raises concern for persistent GIB. She was made NPO, blood consent filed, 1 unit pRBC ordered, and GI planning to rescope this afternoon after a morning dose of erythromycin to aid in gastric emptying.    Interval History: NAEO, afebrile VSS on RA. Hgb this am was back low at 6.7 1unit ordered. Pt had one dark stool yesterday like to her previous hemoptysis spell.  and daughter at bedside, all acknowledging plan and NPO status.     Review of Systems   Constitutional:  Negative for chills, diaphoresis, fatigue and fever.   HENT:  Negative for nosebleeds and sore throat.    Respiratory:  Negative for apnea, cough, choking and shortness of breath.    Gastrointestinal:  Negative for abdominal distention, abdominal pain, anal bleeding, blood in stool, constipation, diarrhea, nausea, rectal pain and vomiting.   Genitourinary:  Negative for difficulty urinating and flank pain.   Musculoskeletal:  Negative for arthralgias, back pain, joint swelling and myalgias.   Neurological:  Negative for headaches.     Objective:     Vital Signs (Most Recent):  Temp: 98 °F (36.7 °C) (08/01/25 1116)  Pulse: 81 (08/01/25 1116)  Resp: 18 (08/01/25 0823)  BP: (!) 150/84 (08/01/25 1116)  SpO2: 99 % (08/01/25 1116) Vital Signs (24h Range):  Temp:  [97.7 °F (36.5 °C)-98.8 °F (37.1 °C)] 98 °F (36.7 °C)  Pulse:  [69-95] 81  Resp:  [17-22] 18  SpO2:  [92 %-99 %] 99 %  BP: (106-163)/(69-99) 150/84     Weight: 98.9 kg (218 lb)  Body mass index is 34.14 kg/m².    Intake/Output Summary  (Last 24 hours) at 8/1/2025 1121  Last data filed at 7/31/2025 1552  Gross per 24 hour   Intake 250 ml   Output --   Net 250 ml         Physical Exam  Vitals and nursing note reviewed.   Constitutional:       General: She is not in acute distress.     Appearance: Normal appearance. She is obese. She is not ill-appearing.   HENT:      Head: Normocephalic and atraumatic.      Mouth/Throat:      Pharynx: No oropharyngeal exudate or posterior oropharyngeal erythema.   Eyes:      General: No scleral icterus.     Extraocular Movements: Extraocular movements intact.      Pupils: Pupils are equal, round, and reactive to light.   Cardiovascular:      Rate and Rhythm: Normal rate and regular rhythm.      Pulses: Normal pulses.      Heart sounds: Murmur heard.      No friction rub. No gallop.   Pulmonary:      Effort: Pulmonary effort is normal.      Breath sounds: Normal breath sounds.   Abdominal:      General: There is no distension.      Palpations: Abdomen is soft. There is no mass.      Tenderness: There is no abdominal tenderness.      Hernia: No hernia is present.   Musculoskeletal:         General: No swelling or tenderness.      Right lower leg: No edema.      Left lower leg: No edema.   Skin:     Capillary Refill: Capillary refill takes less than 2 seconds.      Coloration: Skin is not jaundiced.   Neurological:      General: No focal deficit present.      Mental Status: She is alert and oriented to person, place, and time. Mental status is at baseline.               Significant Labs: All pertinent labs within the past 24 hours have been reviewed.  CBC:   Recent Labs   Lab 07/31/25  0513 08/01/25  0529   WBC 10.62 8.87   HGB 8.1* 6.7*   HCT 24.8* 20.7*    257       Significant Imaging: I have reviewed all pertinent imaging results/findings within the past 24 hours.  EGD                         - Normal esophagus.                          - Medium-sized hiatal hernia.                          - A large amount  of food (residue) mixed with clot                          in the stomach.                          - A single non-bleeding angiodysplastic lesion in                          the stomach. No stigmata of bleeding, but could be                          a potential bleeding source.                          - Congested mucosa in the gastric body.                          - Normal examined duodenum.                          - No specimens collected.       Assessment & Plan  GIB (gastrointestinal bleeding)  Acute blood loss anemia  Anemia  Hemoptysis  Shannon Coleman is a 66 y/o female with a PMH of moderate AS noted on previous echo, HLD, HTN, pre-diabetes, GERD, tobacco and EtOH use presenting to [facility] ED for hematemesis.    Patient's hemorrhage is due to gastrointestinal bleed, this bleeding is not associated with a medication or a coagulopathy. Patients most recent Hgb, Hct, platelets, and INR are listed below.  Recent Labs     07/31/25  0513 08/01/25  0529   HGB 8.1* 6.7*   HCT 24.8* 20.7*    257   INR 1.0  --      Plan  - Will trend hemoglobin/hematocrit Daily  - Will monitor and correct any coagulation defects  - Will transfuse if Hgb is <7g/dl (<8g/dl in cases of active ACS) or if patient has rapid bleeding leading to hemodynamic instability  - has been given 2 unit of pRBCs to date  - GI to repeat scope this pm  - holding VTE prophylaxis until scope is done  - NPO until procedure  - protonix 40mg IV pushes until scope    Initial scope 7/31                         - Normal esophagus.                          - Medium-sized hiatal hernia.                          - A large amount of food (residue) mixed with clot                          in the stomach.                          - A single non-bleeding angiodysplastic lesion in                          the stomach. No stigmata of bleeding, but could be                          a potential bleeding source.                          - Congested mucosa in the  gastric body.                          - Normal examined duodenum.                          - No specimens collected.       HTN (hypertension), benign  Patient's blood pressure range in the last 24 hours was: BP  Min: 106/72  Max: 163/76.The patient's inpatient anti-hypertensive regimen is listed below:  Current Antihypertensives       Plan  - BP is controlled, no changes needed to their regimen  - will add on home antihypertensive medications as indicated  Hyperlipidemia  - atorvastatin 40mg daily    Anxiety  - was using klonopin at home for anxiety  - given 1mg of ativan prior to CT, will continue to monitor    Tobacco use disorder  - nicotine patch ordered PRN    Depression  Patient has persistent depression which is mild and is currently controlled. Will Continue anti-depressant medications. We will not consult psychiatry at this time. Patient does not display psychosis at this time. Continue to monitor closely and adjust plan of care as needed.  - paroxetine 40mg daily      Hyponatremia  Hyponatremia is likely due to Dehydration/hypovolemia. The patient's most recent sodium results are listed below.  Recent Labs     07/31/25  0513 08/01/25  0529   * 136       Plan  - Monitor sodium Daily   - Patient hyponatremia is stable  - Like due to hypovolemia, assessed fluid status will trend on future labs  Tobacco dependency  Dangers of cigarette smoking were reviewed with patient in detail. Patient was Referred to Tobacco Cessation Program. Nicotine replacement options were discussed. Nicotine replacement was discussed- not prescribed per patient's request but we ordered it PRN should she need it  Pre-diabetes  - last A1c 6.0  - holding metformin currently    VTE Risk Mitigation (From admission, onward)           Ordered     IP VTE HIGH RISK PATIENT  Once         07/31/25 0718     Place sequential compression device  Until discontinued         07/31/25 0718                    Discharge Planning   ALIZA: 8/5/2025      Code Status: Full Code   Medical Readiness for Discharge Date:   Discharge Plan A: Home                Fransisco Triana MD  Department of Hospital Medicine   [facility] - Telemetry Stepdown

## 2025-08-01 NOTE — NURSING TRANSFER
Nursing Transfer Note      8/1/2025   4:36 PM    Reason patient is being transferred: Recovery criteria met    PACU nurse giving handoff: EMEKA Hardin    Nurse receiving handoff: EMEKA Hollis     Transfer to 80    Transfer via stretcher    Transfer with cardiac monitoring    Transported by Patient Escort/PCT    Telemetry: Box # [id number] HR 77 NSR  Verified on & working by PACU RN: Yes    Transfer Vital Signs @ 1630  Temperature: 97.9  Blood Pressure: 171/81  Heart Rate: 82  Respirations: 20  O2 Sat:100% on RA    Medicines/Equipment sent with patient: None    Any special needs or follow-up needed: See GI treatment plan/note    Patient belongings transferred with patient: No    Chart send with patient: Yes    Notified:     Patient reassessed prior to transfer at: 8/1/2025 @ 1630

## 2025-08-01 NOTE — TELEPHONE ENCOUNTER
Spoke to pt and her daughter. Advised she would need a CTA A/P prior to seeing Dr. Velasco. Unable to schedule pt for CTA or appt w/ Dr. Velasco as her insurance doesn't have benefits here. Advised her daughter that she should contact pt's insurance to find providers in network. Patient and daughter verbalized understanding and had no further questions.

## 2025-08-01 NOTE — PROGRESS NOTES
Gastroenterology Treatment Plan    Shannon Coleman is a 65 y.o. female admitted to hospital 7/31/2025 (Hospital Day: 2) due to GIB (gastrointestinal bleeding), hematemesis.    Interval History  EGD 7/31: Single non-bleeding angiodysplastic lesion in the stomach without stigmata of bleeding. A large amount of food (residue) mixed with clot in the stomach as well.     Feels well this morning. She does not have abdominal pain. Has not had any more vomiting episodes. Has not had a BM since prior to the EGD. Despite this, Hgb this morning dropped to 6.7 from 8.1 yesterday. Unclear if related to ongoing bleed vs residual blood loss and lab variation etc. Has been marked NPO however had eggs this morning. Discussed with her the plan for repeat EGD given persistent anemia and large food product on yesterdays scope. Denies other symptoms.     Objective  Temp:  [97.8 °F (36.6 °C)-98.8 °F (37.1 °C)] 98.1 °F (36.7 °C) (08/01 0823)  Pulse:  [69-95] 90 (08/01 0823)  BP: (106-163)/(69-99) 138/86 (08/01 0823)  Resp:  [17-22] 18 (08/01 0823)  SpO2:  [92 %-100 %] 99 % (08/01 0823)    General: Alert, Oriented x3, no distress  Neurologic: Asterixis absent  Abdomen: Normoactive bowel sounds. Non-distended. Normal tympany. Soft. Non-tender. No peritoneal signs.    Laboratory    Lab Results   Component Value Date    WBC 8.87 08/01/2025    HGB 6.7 (L) 08/01/2025    HCT 20.7 (L) 08/01/2025    MCV 94 08/01/2025     08/01/2025       Lab Results   Component Value Date     08/01/2025    K 4.1 08/01/2025     08/01/2025    CO2 17 (L) 08/01/2025    BUN 33 (H) 08/01/2025    CREATININE 2.1 (H) 08/01/2025    CALCIUM 7.7 (L) 08/01/2025       Lab Results   Component Value Date    ALBUMIN 2.9 (L) 08/01/2025    ALT 11 08/01/2025    AST 18 08/01/2025    ALKPHOS 27 (L) 08/01/2025    BILITOT 0.2 08/01/2025       Lab Results   Component Value Date    INR 1.0 07/31/2025       Assessment  Shannon Coleman is a 65 y.o. female admitted to  hospital 7/31/2025 (Hospital Day: 2) due to GIB (gastrointestinal bleeding) presenting as hematemesis (3 episodes). EGD done 7/31 with large amount of food product mixed with clot and non bleedin angiodysplastic lesion in the fundus, suspect source of bleed. Given continued anemia and difficult visualization of entire stomach on last EGD, will plan for repeat today.     Plan  - Plan for EGD today, 8/1  - Please transfuse to goal >7  - Please ensure NPO status now  - Please given Erythromycin 250 mg IV once now  - Continue IV PPI BID  - Further recommendations after EGD today    Thank you for involving us in the care of Shannon Coleman. Please call with any additional concerns or questions.    Ryan John MD   Gastroenterology Fellow PGY IV  [facility]

## 2025-08-01 NOTE — ASSESSMENT & PLAN NOTE
Shannon Coleman is a 64 y/o female with a PMH of moderate AS noted on previous echo, HLD, HTN, pre-diabetes, GERD, tobacco and EtOH use presenting to [facility] ED for hematemesis.    Patient's hemorrhage is due to gastrointestinal bleed, this bleeding is not associated with a medication or a coagulopathy. Patients most recent Hgb, Hct, platelets, and INR are listed below.  Recent Labs     07/31/25  0513 08/01/25  0529   HGB 8.1* 6.7*   HCT 24.8* 20.7*    257   INR 1.0  --      Plan  - Will trend hemoglobin/hematocrit Daily  - Will monitor and correct any coagulation defects  - Will transfuse if Hgb is <7g/dl (<8g/dl in cases of active ACS) or if patient has rapid bleeding leading to hemodynamic instability  - has been given 2 unit of pRBCs to date  - GI to repeat scope this pm  - holding VTE prophylaxis until scope is done  - NPO until procedure  - protonix 40mg IV pushes until scope    Initial scope 7/31                         - Normal esophagus.                          - Medium-sized hiatal hernia.                          - A large amount of food (residue) mixed with clot                          in the stomach.                          - A single non-bleeding angiodysplastic lesion in                          the stomach. No stigmata of bleeding, but could be                          a potential bleeding source.                          - Congested mucosa in the gastric body.                          - Normal examined duodenum.                          - No specimens collected.

## 2025-08-01 NOTE — ANESTHESIA PREPROCEDURE EVALUATION
08/01/2025  Shannon Coleman is a                65 y.o., female with continued hematemesis here for repeat EGD. Despite NPO at midnight, patient was given breakfast at appx 6:30 AM.     Past Medical History:   Diagnosis Date    Allergy     Anxiety     Arthritis     Depression     GERD (gastroesophageal reflux disease)     Hyperlipidemia     Hypertension     Tobacco abuse     Vitamin D deficiency disease        Past Surgical History:   Procedure Laterality Date    child birth      ESOPHAGOGASTRODUODENOSCOPY N/A 7/31/2025    Procedure: EGD (ESOPHAGOGASTRODUODENOSCOPY);  Surgeon: Reese Gill MD;  Location: [facility] ([address] [place] [address]);  Service: Endoscopy;  Laterality: N/A;    OPEN REDUCTION AND INTERNAL FIXATION (ORIF) OF FRACTURE OF PROXIMAL HUMERUS Left 6/28/2018    Procedure: ORIF, HUMERUS, PROXIMAL- synthes- left- c arm;  Surgeon: Quinn Parker MD;  Location: [facility] OR [address] [place] [address];  Service: Orthopedics;  Laterality: Left;    ORIF HUMERUS FRACTURE Left 6/28/2018    Procedure: ORIF, FRACTURE, HUMERUS - shaft;  Surgeon: Quinn Parker MD;  Location: [facility] OR [address] [place] [address];  Service: Orthopedics;  Laterality: Left;           Pre-op Assessment    I have reviewed the Patient Summary Reports.          Review of Systems  Anesthesia Hx:  No problems with previous Anesthesia   History of prior surgery of interest to airway management or planning:          Denies Family Hx of Anesthesia complications.    Denies Personal Hx of Anesthesia complications.                    Social:  Alcohol Use, Smoker       Cardiovascular:     Hypertension       Denies Angina.       Denies RIVAS.                              Pulmonary:  Pulmonary Normal    Denies Asthma.    Denies Recent URI.                 Hepatic/GI:     GERD   Multiple episodes of hematemesis, last was this morning.  S/p PRBC transfusion                 Physical Exam  General:  Alert, Oriented and Well nourished    Airway:  Mallampati: III   Mouth Opening: Normal  TM Distance: Normal  Neck ROM: Normal ROM    Dental:  Edentulous    Chest/Lungs:  Normal Respiratory Rate    Heart:  Rate: Normal  Rhythm: Regular Rhythm        Anesthesia Plan  Type of Anesthesia, risks & benefits discussed:    Anesthesia Type: Gen ETT  Intra-op Monitoring Plan: Standard ASA Monitors  Post Op Pain Control Plan: multimodal analgesia and IV/PO Opioids PRN  Informed Consent: Informed consent signed with the Patient and all parties understand the risks and agree with anesthesia plan.  All questions answered.   ASA Score: 3  Day of Surgery Review of History & Physical: H&P Update referred to the surgeon/provider.    Ready For Surgery From Anesthesia Perspective.     .

## 2025-08-01 NOTE — ASSESSMENT & PLAN NOTE
Shannon Coleman is a 66 y/o female with a PMH of moderate AS noted on previous echo, HLD, HTN, pre-diabetes, GERD, tobacco and EtOH use presenting to [facility] ED for hematemesis.    Patient's hemorrhage is due to gastrointestinal bleed, this bleeding is not associated with a medication or a coagulopathy. Patients most recent Hgb, Hct, platelets, and INR are listed below.  Recent Labs     07/31/25  0513 08/01/25  0529   HGB 8.1* 6.7*   HCT 24.8* 20.7*    257   INR 1.0  --      Plan  - Will trend hemoglobin/hematocrit Daily  - Will monitor and correct any coagulation defects  - Will transfuse if Hgb is <7g/dl (<8g/dl in cases of active ACS) or if patient has rapid bleeding leading to hemodynamic instability  - has been given 2 unit of pRBCs to date  - GI to repeat scope this pm  - holding VTE prophylaxis until scope is done  - NPO until procedure  - protonix 40mg IV pushes until scope    Initial scope 7/31                         - Normal esophagus.                          - Medium-sized hiatal hernia.                          - A large amount of food (residue) mixed with clot                          in the stomach.                          - A single non-bleeding angiodysplastic lesion in                          the stomach. No stigmata of bleeding, but could be                          a potential bleeding source.                          - Congested mucosa in the gastric body.                          - Normal examined duodenum.                          - No specimens collected.

## 2025-08-01 NOTE — INTERVAL H&P NOTE
The patient has been examined and the H&P has been reviewed:    Pre-Procedure H and P Addendum    Patient seen and examined.  History and exam unchanged from prior history and physical.      Procedure: EGD  Indication: GI bleeding  ASA Class: per anesthesiology  Airway: normal  Neck Mobility: full range of motion  Mallampatti score: per anesthesia  History of anesthesia problems: no  Family history of anesthesia problems: no  Anesthesia Plan: MAC      Active Hospital Problems    Diagnosis  POA    *GIB (gastrointestinal bleeding) [K92.2]  Yes    Anemia [D64.9]  Yes    Tobacco dependency [F17.200]  Yes    Hemoptysis [R04.2]  Yes    Pre-diabetes [R73.03]  Yes    Acute blood loss anemia [D62]  Yes    Hyponatremia [E87.1]  Yes    Depression [F32.A]  Yes    Tobacco use disorder [F17.200]  Yes    HTN (hypertension), benign [I10]  Yes    Hyperlipidemia [E78.5]  Yes    Anxiety [F41.9]  Yes      Resolved Hospital Problems   No resolved problems to display.

## 2025-08-02 VITALS
SYSTOLIC BLOOD PRESSURE: 157 MMHG | DIASTOLIC BLOOD PRESSURE: 88 MMHG | HEIGHT: 67 IN | BODY MASS INDEX: 34.21 KG/M2 | WEIGHT: 218 LBS | TEMPERATURE: 98 F | HEART RATE: 79 BPM | RESPIRATION RATE: 18 BRPM | OXYGEN SATURATION: 99 %

## 2025-08-02 PROBLEM — R91.1 PULMONARY NODULE: Status: ACTIVE | Noted: 2025-08-02

## 2025-08-02 LAB
ABSOLUTE EOSINOPHIL (OHS): 0.22 K/UL
ABSOLUTE MONOCYTE (OHS): 0.82 K/UL (ref 0.3–1)
ABSOLUTE NEUTROPHIL COUNT (OHS): 5.67 K/UL (ref 1.8–7.7)
BASOPHILS # BLD AUTO: 0.05 K/UL
BASOPHILS NFR BLD AUTO: 0.5 %
ERYTHROCYTE [DISTWIDTH] IN BLOOD BY AUTOMATED COUNT: 15 % (ref 11.5–14.5)
HCT VFR BLD AUTO: 24.5 % (ref 37–48.5)
HGB BLD-MCNC: 8 GM/DL (ref 12–16)
IMM GRANULOCYTES # BLD AUTO: 0.05 K/UL (ref 0–0.04)
IMM GRANULOCYTES NFR BLD AUTO: 0.5 % (ref 0–0.5)
LYMPHOCYTES # BLD AUTO: 2.41 K/UL (ref 1–4.8)
MCH RBC QN AUTO: 31.3 PG (ref 27–31)
MCHC RBC AUTO-ENTMCNC: 32.7 G/DL (ref 32–36)
MCV RBC AUTO: 96 FL (ref 82–98)
NUCLEATED RBC (/100WBC) (OHS): 0 /100 WBC
PLATELET # BLD AUTO: 263 K/UL (ref 150–450)
PMV BLD AUTO: 10.3 FL (ref 9.2–12.9)
RBC # BLD AUTO: 2.56 M/UL (ref 4–5.4)
RELATIVE EOSINOPHIL (OHS): 2.4 %
RELATIVE LYMPHOCYTE (OHS): 26.1 % (ref 18–48)
RELATIVE MONOCYTE (OHS): 8.9 % (ref 4–15)
RELATIVE NEUTROPHIL (OHS): 61.6 % (ref 38–73)
WBC # BLD AUTO: 9.22 K/UL (ref 3.9–12.7)

## 2025-08-02 PROCEDURE — 36415 COLL VENOUS BLD VENIPUNCTURE: CPT

## 2025-08-02 PROCEDURE — 25000003 PHARM REV CODE 250

## 2025-08-02 PROCEDURE — 85025 COMPLETE CBC W/AUTO DIFF WBC: CPT

## 2025-08-02 PROCEDURE — 63600175 PHARM REV CODE 636 W HCPCS

## 2025-08-02 PROCEDURE — 94761 N-INVAS EAR/PLS OXIMETRY MLT: CPT

## 2025-08-02 RX ORDER — FENOFIBRATE 54 MG/1
54 TABLET ORAL DAILY
Qty: 90 TABLET | Refills: 0 | Status: SHIPPED | OUTPATIENT
Start: 2025-08-02

## 2025-08-02 RX ORDER — PAROXETINE 40 MG/1
40 TABLET, FILM COATED ORAL DAILY
Qty: 90 TABLET | Refills: 0 | Status: SHIPPED | OUTPATIENT
Start: 2025-08-02

## 2025-08-02 RX ORDER — PANTOPRAZOLE SODIUM 40 MG/1
40 TABLET, DELAYED RELEASE ORAL DAILY
Qty: 90 TABLET | Refills: 3 | Status: SHIPPED | OUTPATIENT
Start: 2025-08-02 | End: 2026-08-02

## 2025-08-02 RX ADMIN — PAROXETINE HYDROCHLORIDE 40 MG: 10 TABLET, FILM COATED ORAL at 09:08

## 2025-08-02 RX ADMIN — ATORVASTATIN CALCIUM 40 MG: 40 TABLET, FILM COATED ORAL at 09:08

## 2025-08-02 RX ADMIN — PANTOPRAZOLE SODIUM 40 MG: 40 INJECTION, POWDER, FOR SOLUTION INTRAVENOUS at 09:08

## 2025-08-02 NOTE — ASSESSMENT & PLAN NOTE
Shannon Coleman is a 64 y/o female with a PMH of moderate AS noted on previous echo, HLD, HTN, pre-diabetes, GERD, tobacco and EtOH use presenting to [facility] ED for hematemesis.    Patient's hemorrhage is due to gastrointestinal bleed, this bleeding is not associated with a medication or a coagulopathy. Patients most recent Hgb, Hct, platelets, and INR are listed below.  Recent Labs     07/31/25  0513 08/01/25  0529 08/02/25  0331   HGB 8.1* 6.7* 8.0*   HCT 24.8* 20.7* 24.5*    257 263   INR 1.0  --   --      Plan  - Will trend hemoglobin/hematocrit Daily  - Will monitor and correct any coagulation defects  - Will transfuse if Hgb is <7g/dl (<8g/dl in cases of active ACS) or if patient has rapid bleeding leading to hemodynamic instability  - has been given 2 unit of pRBCs to date  - GI to repeat scope this pm  - holding VTE prophylaxis until scope is done  - NPO until procedure  - protonix 40mg IV pushes until scope    Initial scope 7/31                         - Normal esophagus.                          - Medium-sized hiatal hernia.                          - A large amount of food (residue) mixed with clot                          in the stomach.                          - A single non-bleeding angiodysplastic lesion in                          the stomach. No stigmata of bleeding, but could be                          a potential bleeding source.                          - Congested mucosa in the gastric body.                          - Normal examined duodenum.                          - No specimens collected.   Second Scope 8/1                         - Normal esophagus.                          - Z-line regular, 40 cm from the incisors.                          - A single non-bleeding angioectasia in the                          stomach.                          - Prominent gastric folds.                          - Normal examined duodenum.                          - No specimens collected.    Outpatient Protonix 40mg daily, avoid NSAIDs and aspirin

## 2025-08-02 NOTE — PLAN OF CARE
[facility] - Telemetry Stepdown  Discharge Final Note    Primary Care Provider: Wagner Aragon MD    Expected Discharge Date: 8/2/2025    Discharge orders and chart reviewed with no further post-acute discharge needs identified at this time.  At this time, patient is cleared for discharge from Case Management standpoint.       Final Discharge Note (most recent)       Final Note - 08/02/25 1041          Final Note    Assessment Type Final Discharge Note     Anticipated Discharge Disposition Home or Self Care     Hospital Resources/Appts/Education Provided Appointments scheduled and added to AVS        Post-Acute Status    Discharge Delays None known at this time                     Important Message from Medicare             Contact Info       Wagner Aragon MD   Specialty: Family Medicine   Relationship: PCP - General    [address] [place] [id number]   Phone: [phone]       Next Steps: Schedule an appointment as soon as possible for a visit in 3 day(s)

## 2025-08-02 NOTE — PLAN OF CARE
Problem: Adult Inpatient Plan of Care  Goal: Optimal Comfort and Wellbeing  Intervention: Monitor Pain and Promote Comfort  Flowsheets (Taken 8/1/2025 1951)  Pain Management Interventions:   care clustered   pain management plan reviewed with patient/caregiver   Plan of care review with pt. AAOx4. X1 RBC infused on the unit and finished while pt off unit getting procedure. I&O documented. Bed is locked and in low position, call light within reach.

## 2025-08-02 NOTE — ASSESSMENT & PLAN NOTE
Patient's blood pressure range in the last 24 hours was: BP  Min: 109/71  Max: 171/81.The patient's inpatient anti-hypertensive regimen is listed below:  Current Antihypertensives       Plan  - BP is controlled, no changes needed to their regimen  - will add on home antihypertensive medications as indicated

## 2025-08-02 NOTE — NURSING
Pt discharged by family via wheelchair. Discharge instruction reviewed, meds delivered, PIV removed at bedside, all belongings sent w pt.

## 2025-08-02 NOTE — PLAN OF CARE
Problem: Adult Inpatient Plan of Care  Goal: Plan of Care Review  Outcome: Progressing  Flowsheets (Taken 8/2/2025 0359)  Plan of Care Reviewed With: patient  Goal: Patient-Specific Goal (Individualized)  Outcome: Progressing  Goal: Absence of Hospital-Acquired Illness or Injury  Outcome: Progressing  Intervention: Identify and Manage Fall Risk  Flowsheets (Taken 8/2/2025 0359)  Safety Promotion/Fall Prevention:   assistive device/personal item within reach   bed alarm refused   Fall Risk reviewed with patient/family   family to remain at bedside   family expresses understanding of fall risk and prevention   instructed to call staff for mobility   lighting adjusted   medications reviewed   nonskid shoes/socks when out of bed   patient expresses understanding of fall risk and prevention   side rails raised x 2  Intervention: Prevent Skin Injury  Flowsheets (Taken 8/2/2025 0359)  Body Position: position changed independently  Skin Protection: incontinence pads utilized  Device Skin Pressure Protection: absorbent pad utilized/changed  Intervention: Prevent and Manage VTE (Venous Thromboembolism) Risk  Flowsheets (Taken 8/2/2025 0359)  VTE Prevention/Management: bleeding risk factor(s) identified, provider notified  Intervention: Prevent Infection  Flowsheets (Taken 8/2/2025 0359)  Infection Prevention:   environmental surveillance performed   hand hygiene promoted   single patient room provided   rest/sleep promoted       Patient awake, alert , oriented.  Pain assessed, pt denies having pain.  pt educated on safety, and medication use. Pt verbalized understanding.  Plan of care discussed with patient, patient verbalized understanding.  Call light within reach.  Bed alarm on.  Bed  low and locked.

## 2025-08-02 NOTE — DISCHARGE SUMMARY
"[facility] - Telemetry [facility] Medicine  Discharge Summary      Patient Name: Shannon Coleman  MRN: [id number]  LAVONNE: [id number]  Patient Class: IP- Inpatient  Admission Date: 7/31/2025  Hospital Length of Stay: 2 days  Discharge Date and Time: 08/02/2025 12:29 PM  Attending Physician: Elizabeth Gore MD   Discharging Provider: Fransisco Triana MD  Primary Care Provider: Wagner Aragon MD  [facility] Medicine Team: [facility] Fransisco Triana MD  Primary Care Team: [facility] [place] [facility]    HPI:   Shannon Coleman is a 66 y/o female with a PMH of moderate AS noted on previous echo, HLD, HTN, pre-diabetes, GERD, depression, nasal skin cancer s/p removal, tobacco and EtOH use presenting to [facility] ED for hematemesis. She states that she had two episodes around 10:30 pm last night that were bright red and large volume, and one episode around her presentation to ED that was smaller. Before this occurred, she was drinking approximately 6 beers and was took 1 pack of BC powder for a headache. She then went to the bathroom do to nausea and began having emesis and had an episode of diarrhea after. She denies dizziness or syncope. She denies history of hematemesis or dark stools. She reports that she drinks "a couple beers" 3 times a week. She takes BC powder 3 times a week for headache. She smokes 1 pack a day and has a 30 pack year history. She denies CP, SOB, recent illness or sick contacts, numbness, and any known hx of cardiac, liver, or kidney problems. She recently lost her PCP due to them switching locations so is also beginning to run out her home medications.     In the ED: afebrile, BP 87/52, symptomatic. Hb 8.1. Na 128, CO2 14, BUN 27, Cr 2, glucose 129, ALP 31. Bili, AST, ALT, lipase wnl. Hep C, HIV negative. EKG showed NSR and prolonged QT (505s). CXR showed stable cardiomegaly and no focal consolidation. CT AP noncon pending. Given 1 unit pbrc.Given 2g ceftriaxone, droperidol, ondansetron, pantoprazole, and octreotide, and " one dose of lorazepam for CT anxiety.     Procedure(s) (LRB):  EGD (ESOPHAGOGASTRODUODENOSCOPY) (N/A)      Hospital Course:   CT abd Mild diffuse gastric wall thickening, nonspecific, correlate for possible gastritis. Small hiatal hernia. Advanced calcific atherosclerotic plaque.  Prominent intraluminal calcific plaque within the distal abdominal aorta and proximal common iliac arteries concerning for severe narrowing. EGD 7/31 was made difficult due to some retained food products. Following EGD the patient was not fully aware of clear fluid diet and was brought some eggs and coffee by family. Her hemoglobin resulted low again at 6.7 which raises concern for persistent GIB. She was made NPO, blood consent filed, 1 unit pRBC ordered, and GI planning to rescope this afternoon after a morning dose of erythromycin to aid in gastric emptying. Second scope visualized an inflamed avn that was like source of bleeding but didn't require any acute intervention. Her hemoglobin remained stable over night. We are planning to discharge today now that she is medically stable with a litany of follow ups.     Most importantly to follow up urgently with GI and Internal Medicine PCP for CBC to monitor for any sequela or continued bleeding. As well as starting home protonix 40mg daily (in place of her previous omeprazole), and avoiding aspirin and NSAIDs for her infrequent headaches. Her PCP will also aid in any referrals or follow up on previous referrals from the hospitalization.    We also recommend in the near future seeing [facility], for reevaluation of her gastric anatomy and to scan for any abnormalities that may be influencing her recent GIB. As well as cardiology for her previous echo findings, case management to aid in coordination of care, and vascular surgery to address the CT findings related to her constricted abdominal aorta and iliacs.     Lastly, we ordered referrals to our smoking cessation program,  distant referral to Pulmonology for continued observation of a small lung nodule found on CT in a patient who is at higher risk, and Nephrology to trend her kidney function and treat appropriately with long term follow ups.      Goals of Care Treatment Preferences:  Code Status: Full Code    Vitals:    08/02/25 0013 08/02/25 0505 08/02/25 0740 08/02/25 1138   BP: 110/76 109/71 (!) 160/87 (!) 157/88   BP Location: Right arm      Patient Position: Lying Lying     Pulse: 75 74 77 79   Resp: 20 18 18    Temp: 97.9 °F (36.6 °C) 97.9 °F (36.6 °C) 98.3 °F (36.8 °C) 98.4 °F (36.9 °C)   TempSrc: Oral Oral Oral Oral   SpO2: 96% 95% 97% 99%   Weight:       Height:         Physical Exam  Vitals and nursing note reviewed.   Constitutional:       Appearance: Normal appearance. She is obese.   HENT:      Head: Normocephalic and atraumatic.   Eyes:      Extraocular Movements: Extraocular movements intact.      Pupils: Pupils are equal, round, and reactive to light.   Cardiovascular:      Rate and Rhythm: Normal rate and regular rhythm.      Pulses: Normal pulses.      Heart sounds: Murmur heard.   Pulmonary:      Effort: Pulmonary effort is normal. No respiratory distress.      Breath sounds: No stridor. Rales present. No wheezing or rhonchi.   Abdominal:      General: Abdomen is flat. There is no distension.      Palpations: Abdomen is soft.      Tenderness: There is no abdominal tenderness. There is no guarding.   Musculoskeletal:         General: No swelling or tenderness. Normal range of motion.      Right lower leg: No edema.      Left lower leg: No edema.   Skin:     General: Skin is warm and dry.      Capillary Refill: Capillary refill takes less than 2 seconds.   Neurological:      General: No focal deficit present.      Mental Status: She is alert and oriented to person, place, and time. Mental status is at baseline.            Consults:   Consults (From admission, onward)          Status Ordering Provider     Inpatient  consult to Gastroenterology  Once        Provider:  (Not yet assigned)    Completed KANDIS PAULSON            Assessment & Plan  GIB (gastrointestinal bleeding)  Acute blood loss anemia  Anemia  Hemoptysis  Shannon Coleman is a 66 y/o female with a PMH of moderate AS noted on previous echo, HLD, HTN, pre-diabetes, GERD, tobacco and EtOH use presenting to [facility] ED for hematemesis.    Patient's hemorrhage is due to gastrointestinal bleed, this bleeding is not associated with a medication or a coagulopathy. Patients most recent Hgb, Hct, platelets, and INR are listed below.  Recent Labs     07/31/25  0513 08/01/25  0529 08/02/25  0331   HGB 8.1* 6.7* 8.0*   HCT 24.8* 20.7* 24.5*    257 263   INR 1.0  --   --      Plan  - Will trend hemoglobin/hematocrit Daily  - Will monitor and correct any coagulation defects  - Will transfuse if Hgb is <7g/dl (<8g/dl in cases of active ACS) or if patient has rapid bleeding leading to hemodynamic instability  - has been given 2 unit of pRBCs to date  - GI to repeat scope this pm  - holding VTE prophylaxis until scope is done  - NPO until procedure  - protonix 40mg IV pushes until scope    Initial scope 7/31                         - Normal esophagus.                          - Medium-sized hiatal hernia.                          - A large amount of food (residue) mixed with clot                          in the stomach.                          - A single non-bleeding angiodysplastic lesion in                          the stomach. No stigmata of bleeding, but could be                          a potential bleeding source.                          - Congested mucosa in the gastric body.                          - Normal examined duodenum.                          - No specimens collected.   Second Scope 8/1                         - Normal esophagus.                          - Z-line regular, 40 cm from the incisors.                          - A single non-bleeding angioectasia in  the                          stomach.                          - Prominent gastric folds.                          - Normal examined duodenum.                          - No specimens collected.   Outpatient Protonix 40mg daily, avoid NSAIDs and aspirin  HTN (hypertension), benign  Patient's blood pressure range in the last 24 hours was: BP  Min: 109/71  Max: 171/81.The patient's inpatient anti-hypertensive regimen is listed below:  Current Antihypertensives       Plan  - BP is controlled, no changes needed to their regimen  - will add on home antihypertensive medications as indicated  Hyperlipidemia  - atorvastatin 40mg daily    Anxiety  - was using klonopin at home for anxiety  - given 1mg of ativan prior to CT, will continue to monitor    Tobacco use disorder  - nicotine patch ordered PRN    Depression  Patient has persistent depression which is mild and is currently controlled. Will Continue anti-depressant medications. We will not consult psychiatry at this time. Patient does not display psychosis at this time. Continue to monitor closely and adjust plan of care as needed.  - paroxetine 40mg daily      Hyponatremia  Hyponatremia is likely due to Dehydration/hypovolemia. The patient's most recent sodium results are listed below.  Recent Labs     07/31/25  0513 08/01/25  0529   * 136       Plan  - Monitor sodium Daily   - Patient hyponatremia is stable  - Like due to hypovolemia, assessed fluid status will trend on future labs  Tobacco dependency  Dangers of cigarette smoking were reviewed with patient in detail. Patient was Referred to Tobacco Cessation Program. Nicotine replacement options were discussed. Nicotine replacement was discussed- not prescribed per patient's request but we ordered it PRN should she need it  Pre-diabetes  - last A1c 6.0  - holding metformin currently    Pulmonary nodule  Outpatient follow      Final Active Diagnoses:    Diagnosis Date Noted POA    PRINCIPAL PROBLEM:  GIB  (gastrointestinal bleeding) [K92.2] 07/31/2025 Yes    Pulmonary nodule [R91.1] 08/02/2025 Unknown    Anemia [D64.9] 07/31/2025 Yes    Tobacco dependency [F17.200] 07/31/2025 Yes    Hemoptysis [R04.2] 07/31/2025 Yes    Pre-diabetes [R73.03] 07/31/2025 Yes    Acute blood loss anemia [D62] 06/29/2018 Yes    Hyponatremia [E87.1] 06/26/2018 Yes    Depression [F32.A] 01/21/2013 Yes    Tobacco use disorder [F17.200] 10/18/2012 Yes    HTN (hypertension), benign [I10] 07/18/2012 Yes    Hyperlipidemia [E78.5] 07/18/2012 Yes    Anxiety [F41.9] 07/18/2012 Yes      Problems Resolved During this Admission:       Discharged Condition: stable    Disposition: Home or Self Care    Follow Up:   Follow-up Information       Wagner Aragon MD. Schedule an appointment as soon as possible for a visit in 3 day(s).    Specialty: Family Medicine  Contact information:  [address] [place] [id number] [phone]                           Patient Instructions:      Ambulatory referral/consult to Vascular Surgery   Standing Status: Future   Referral Priority: Routine Referral Type: Consultation   Referral Reason: Specialty Services Required   Requested Specialty: Vascular Surgery   Number of Visits Requested: 1     Ambulatory referral/consult to Smoking Cessation Program   Standing Status: Future   Referral Priority: Routine Referral Type: Consultation   Referral Reason: Specialty Services Required   Requested Specialty: CTTS   Number of Visits Requested: 1     Ambulatory referral/consult to Cardiology   Standing Status: Future   Referral Priority: Routine Referral Type: Consultation   Referral Reason: Specialty Services Required   Requested Specialty: Cardiology   Number of Visits Requested: 1     Ambulatory referral/consult to Gastroenterology   Standing Status: Future   Referral Priority: Urgent Referral Type: Consultation   Referral Reason: Specialty Services Required   Requested Specialty: Gastroenterology   Number of Visits  Requested: 1     Amb Referral/Consult to [facility]   Standing Status: Future   Referral Priority: Routine Referral Type: Consultation   Number of Visits Requested: 1     Ambulatory referral/consult to Pulmonology   Standing Status: Future   Referral Priority: Routine Referral Type: Consultation   Referral Reason: Specialty Services Required   Requested Specialty: Pulmonary Disease   Number of Visits Requested: 1     Ambulatory referral/consult to Internal Medicine   Standing Status: Future   Referral Priority: Urgent Referral Type: Consultation   Referral Reason: Specialty Services Required   Requested Specialty: Internal Medicine   Number of Visits Requested: 1     Ambulatory referral/consult to Outpatient Case Management   Referral Priority: Routine Referral Type: Consultation   Referral Reason: Specialty Services Required   Number of Visits Requested: 1       Significant Diagnostic Studies: Endoscopy: Gastroscopy:                          - Normal esophagus.                          - Z-line regular, 40 cm from the incisors.                          - A single non-bleeding angioectasia in the                          stomach.                          - Prominent gastric folds.                          - Normal examined duodenum.                          - No specimens collected.     Pending Diagnostic Studies:       Procedure Component Value Units Date/Time    HCV Virus Hold Specimen [[id number]] Collected: 07/31/25 0513    Order Status: Sent Lab Status: In process Updated: 07/31/25 0537    Specimen: Blood            Medications:  Reconciled Home Medications:      Medication List        START taking these medications      pantoprazole 40 MG tablet  Commonly known as: PROTONIX  Take 1 tablet (40 mg total) by mouth once daily.            CHANGE how you take these medications      fenofibrate 54 MG tablet  Commonly known as: TRICOR  Take 1 tablet (54 mg total) by mouth once daily.  What  changed:   medication strength  See the new instructions.     paroxetine 40 MG tablet  Commonly known as: PAXIL  Take 1 tablet (40 mg total) by mouth once daily.  What changed: when to take this            CONTINUE taking these medications      amLODIPine 10 MG tablet  Commonly known as: NORVASC  Take 1 tablet (10 mg total) by mouth once daily.     atorvastatin 40 MG tablet  Commonly known as: LIPITOR  Take 1 tablet (40 mg total) by mouth once daily.     blood pressure test kit-large Kit  1 each by Misc.(Non-Drug; Combo Route) route 2 (two) times a day.     clonazePAM 1 MG tablet  Commonly known as: KlonoPIN  Take 1 tablet (1 mg total) by mouth every evening.     ergocalciferol 50,000 unit Cap  Commonly known as: ERGOCALCIFEROL  Take 1 capsule (50,000 Units total) by mouth every 7 days.     metFORMIN 500 MG tablet  Commonly known as: GLUCOPHAGE  TAKE 1 TABLET BY MOUTH EVERY DAY WITH BREAKFAST     metoprolol succinate 100 MG 24 hr tablet  Commonly known as: TOPROL-XL  Take 1 tablet (100 mg total) by mouth once daily.     omega-3 fatty acids-vitamin E 1,000 mg Cap  Commonly known as: Fish OiL  Take 2 capsules by mouth once daily.     ONE-A-DAY WOMENS FORMULA ORAL  Take 1 tablet by mouth once daily.     triamcinolone acetonide 0.1% 0.1 % ointment  Commonly known as: KENALOG  Apply 1 application  topically 2 (two) times daily. Apply to affected area     ULTRA-LIGHT ROLLATOR Misc  Generic drug: walker  1 each by Misc.(Non-Drug; Combo Route) route once daily.            STOP taking these medications      losartan-hydrochlorothiazide 100-25 mg 100-25 mg per tablet  Commonly known as: HYZAAR     omeprazole 40 MG capsule  Commonly known as: PRILOSEC              Indwelling Lines/Drains at time of discharge:   Lines/Drains/Airways       None                       Time spent on the discharge of patient: 60 minutes         Fransisco Triana MD  Department of Hospital Medicine  [facility] - Telemetry Stepdown

## 2025-08-02 NOTE — ASSESSMENT & PLAN NOTE
Shannon Coleman is a 66 y/o female with a PMH of moderate AS noted on previous echo, HLD, HTN, pre-diabetes, GERD, tobacco and EtOH use presenting to [facility] ED for hematemesis.    Patient's hemorrhage is due to gastrointestinal bleed, this bleeding is not associated with a medication or a coagulopathy. Patients most recent Hgb, Hct, platelets, and INR are listed below.  Recent Labs     07/31/25  0513 08/01/25  0529 08/02/25  0331   HGB 8.1* 6.7* 8.0*   HCT 24.8* 20.7* 24.5*    257 263   INR 1.0  --   --      Plan  - Will trend hemoglobin/hematocrit Daily  - Will monitor and correct any coagulation defects  - Will transfuse if Hgb is <7g/dl (<8g/dl in cases of active ACS) or if patient has rapid bleeding leading to hemodynamic instability  - has been given 2 unit of pRBCs to date  - GI to repeat scope this pm  - holding VTE prophylaxis until scope is done  - NPO until procedure  - protonix 40mg IV pushes until scope    Initial scope 7/31                         - Normal esophagus.                          - Medium-sized hiatal hernia.                          - A large amount of food (residue) mixed with clot                          in the stomach.                          - A single non-bleeding angiodysplastic lesion in                          the stomach. No stigmata of bleeding, but could be                          a potential bleeding source.                          - Congested mucosa in the gastric body.                          - Normal examined duodenum.                          - No specimens collected.   Second Scope 8/1                         - Normal esophagus.                          - Z-line regular, 40 cm from the incisors.                          - A single non-bleeding angioectasia in the                          stomach.                          - Prominent gastric folds.                          - Normal examined duodenum.                          - No specimens collected.    Outpatient Protonix 40mg daily, avoid NSAIDs and aspirin

## 2025-08-03 LAB — HOLD SPECIMEN: NORMAL

## 2025-08-04 NOTE — ANESTHESIA POSTPROCEDURE EVALUATION
Anesthesia Post Evaluation    Patient: Shannon Coleman    Procedure(s) Performed: Procedure(s) (LRB):  EGD (ESOPHAGOGASTRODUODENOSCOPY) (N/A)    Final Anesthesia Type: general      Patient location during evaluation: PACU  Patient participation: Yes- Able to Participate  Level of consciousness: awake and alert  Post-procedure vital signs: reviewed and stable  Pain management: adequate  Airway patency: patent    PONV status at discharge: No PONV  Anesthetic complications: no      Cardiovascular status: blood pressure returned to baseline  Respiratory status: room air  Hydration status: euvolemic  Follow-up not needed.              Vitals Value Taken Time   /88 08/02/25 11:38   Temp 36.9 °C (98.4 °F) 08/02/25 11:38   Pulse 79 08/02/25 11:38   Resp 18 08/02/25 07:40   SpO2 99 % 08/02/25 11:38         Event Time   Out of Recovery 16:37:28         Pain/Becca Score: No data recorded

## 2025-08-05 ENCOUNTER — PATIENT OUTREACH (OUTPATIENT)
Dept: ADMINISTRATIVE | Facility: OTHER | Age: 65
End: 2025-08-05
Payer: MEDICARE

## 2025-08-05 NOTE — PROGRESS NOTES
CHW - Case Closure    This Community Health Worker spoke to patient via telephone today.     Pt/Caregiver reported: pt declined needing community resources at this time    Pt/Caregiver denied any additional needs at this time and agrees with episode closure at this time.  Provided patient with Community Health Worker's contact information and encouraged him/her to contact this Community Health Worker if additional needs arise.

## (undated) DEVICE — APPLICATOR CHLORAPREP ORN 26ML

## (undated) DEVICE — SUPPORT SLING SHOT II MEDIUM

## (undated) DEVICE — DRESSING COVER AQUACEL AG SURG

## (undated) DEVICE — BIT DRILL 2.0

## (undated) DEVICE — DRESSING TRANS 4X4 TEGADERM

## (undated) DEVICE — DRAPE C ARM 42 X 120 10/BX

## (undated) DEVICE — BIT DRILL 2.8 W/QC PERCU 200MM

## (undated) DEVICE — DRAPE STERI U-SHAPED 47X51IN

## (undated) DEVICE — SHEET DRAPE FAN-FOLDED 3/4

## (undated) DEVICE — K-WIRE THRD TRCR PT 5MM 1.6X15
Type: IMPLANTABLE DEVICE | Site: HUMERUS | Status: NON-FUNCTIONAL
Removed: 2018-06-28

## (undated) DEVICE — DRAPE INCISE IOBAN 2 23X17IN

## (undated) DEVICE — BIT DRILL 2.0.

## (undated) DEVICE — DRAPE PLASTIC U 60X72

## (undated) DEVICE — SUT VICRYL PLUS 0 CT1 18IN

## (undated) DEVICE — DRESSING N ADH OIL EMUL 3X8

## (undated) DEVICE — DRESSING AQUACEL AG ADV 3.5X6

## (undated) DEVICE — WIRE-K 20MM X 150MM.
Type: IMPLANTABLE DEVICE | Site: HUMERUS | Status: NON-FUNCTIONAL
Removed: 2018-06-28

## (undated) DEVICE — SEE MEDLINE ITEM 157150

## (undated) DEVICE — DRAPE STERI-DRAPE 1000 17X11IN

## (undated) DEVICE — TAPE SURG DURAPORE 2 X10YD

## (undated) DEVICE — SUT ETHIBOND XTRA2 OS-4 30

## (undated) DEVICE — BIT BRILL 2.5

## (undated) DEVICE — SEE MEDLINE ITEM 157160

## (undated) DEVICE — ELECTRODE REM PLYHSV RETURN 9

## (undated) DEVICE — SUT ETHILON 3/0 18IN PS-1

## (undated) DEVICE — SCREW CAN 4.0MMX50MM
Type: IMPLANTABLE DEVICE | Site: HUMERUS | Status: NON-FUNCTIONAL
Removed: 2018-06-28

## (undated) DEVICE — SEE MEDLINE ITEM 146417

## (undated) DEVICE — SPONGE LAP XRAY STERILE 18X18

## (undated) DEVICE — SEE MEDLINE ITEM 152622

## (undated) DEVICE — BIT DRILL CALIB QC 2.5X230MM

## (undated) DEVICE — IMMOBILIZER SHOULDER W/PAD XL

## (undated) DEVICE — SCRUB HIBICLENS 4% CHG 4OZ

## (undated) DEVICE — TRAY FOLEY CATH 16FR LATEX FRE

## (undated) DEVICE — GAUZE SPONGE 4X4 12PLY

## (undated) DEVICE — SUT VICRYL PLUS 2-0

## (undated) DEVICE — SPONGE LAP 18X18 PREWASHED

## (undated) DEVICE — BLADE SURG CARBON STEEL #10

## (undated) DEVICE — SEE MEDLINE ITEM 152529

## (undated) DEVICE — TRAY MINOR ORTHO

## (undated) DEVICE — DEVICE PUSH PULL REDUC 4.5